# Patient Record
Sex: FEMALE | Race: WHITE | NOT HISPANIC OR LATINO | Employment: FULL TIME | URBAN - METROPOLITAN AREA
[De-identification: names, ages, dates, MRNs, and addresses within clinical notes are randomized per-mention and may not be internally consistent; named-entity substitution may affect disease eponyms.]

---

## 2024-08-01 ENCOUNTER — HOSPITAL ENCOUNTER (OUTPATIENT)
Facility: CLINIC | Age: 34
Setting detail: OBSERVATION
Discharge: HOME | End: 2024-08-03
Attending: STUDENT IN AN ORGANIZED HEALTH CARE EDUCATION/TRAINING PROGRAM | Admitting: HOSPITALIST
Payer: COMMERCIAL

## 2024-08-01 DIAGNOSIS — F10.930 ALCOHOL WITHDRAWAL SYNDROME WITHOUT COMPLICATION (H): ICD-10-CM

## 2024-08-01 DIAGNOSIS — F10.939 ALCOHOL WITHDRAWAL SYNDROME WITH COMPLICATION (H): Primary | ICD-10-CM

## 2024-08-01 LAB
ALBUMIN SERPL BCG-MCNC: 4.6 G/DL (ref 3.5–5.2)
ALP SERPL-CCNC: 66 U/L (ref 40–150)
ALT SERPL W P-5'-P-CCNC: 22 U/L (ref 0–50)
ANION GAP SERPL CALCULATED.3IONS-SCNC: 20 MMOL/L (ref 7–15)
AST SERPL W P-5'-P-CCNC: 35 U/L (ref 0–45)
BASOPHILS # BLD AUTO: 0 10E3/UL (ref 0–0.2)
BASOPHILS NFR BLD AUTO: 1 %
BILIRUB SERPL-MCNC: 0.4 MG/DL
BUN SERPL-MCNC: 3 MG/DL (ref 6–20)
CALCIUM SERPL-MCNC: 8.7 MG/DL (ref 8.8–10.4)
CHLORIDE SERPL-SCNC: 99 MMOL/L (ref 98–107)
CREAT SERPL-MCNC: 0.65 MG/DL (ref 0.51–0.95)
EGFRCR SERPLBLD CKD-EPI 2021: >90 ML/MIN/1.73M2
EOSINOPHIL # BLD AUTO: 0 10E3/UL (ref 0–0.7)
EOSINOPHIL NFR BLD AUTO: 0 %
ERYTHROCYTE [DISTWIDTH] IN BLOOD BY AUTOMATED COUNT: 12.5 % (ref 10–15)
ETHANOL SERPL-MCNC: 0.38 G/DL
GLUCOSE SERPL-MCNC: 117 MG/DL (ref 70–99)
HCG SERPL QL: NEGATIVE
HCO3 SERPL-SCNC: 21 MMOL/L (ref 22–29)
HCT VFR BLD AUTO: 39.5 % (ref 35–47)
HGB BLD-MCNC: 14 G/DL (ref 11.7–15.7)
IMM GRANULOCYTES # BLD: 0 10E3/UL
IMM GRANULOCYTES NFR BLD: 0 %
LYMPHOCYTES # BLD AUTO: 1.3 10E3/UL (ref 0.8–5.3)
LYMPHOCYTES NFR BLD AUTO: 31 %
MAGNESIUM SERPL-MCNC: 1.8 MG/DL (ref 1.7–2.3)
MCH RBC QN AUTO: 31.1 PG (ref 26.5–33)
MCHC RBC AUTO-ENTMCNC: 35.4 G/DL (ref 31.5–36.5)
MCV RBC AUTO: 88 FL (ref 78–100)
MONOCYTES # BLD AUTO: 0.4 10E3/UL (ref 0–1.3)
MONOCYTES NFR BLD AUTO: 10 %
NEUTROPHILS # BLD AUTO: 2.5 10E3/UL (ref 1.6–8.3)
NEUTROPHILS NFR BLD AUTO: 58 %
NRBC # BLD AUTO: 0 10E3/UL
NRBC BLD AUTO-RTO: 0 /100
PHOSPHATE SERPL-MCNC: 2.7 MG/DL (ref 2.5–4.5)
PLATELET # BLD AUTO: 221 10E3/UL (ref 150–450)
POTASSIUM SERPL-SCNC: 3.3 MMOL/L (ref 3.4–5.3)
PROT SERPL-MCNC: 7.9 G/DL (ref 6.4–8.3)
RBC # BLD AUTO: 4.5 10E6/UL (ref 3.8–5.2)
SODIUM SERPL-SCNC: 140 MMOL/L (ref 135–145)
WBC # BLD AUTO: 4.2 10E3/UL (ref 4–11)

## 2024-08-01 PROCEDURE — 83735 ASSAY OF MAGNESIUM: CPT | Performed by: HOSPITALIST

## 2024-08-01 PROCEDURE — 96374 THER/PROPH/DIAG INJ IV PUSH: CPT

## 2024-08-01 PROCEDURE — 85004 AUTOMATED DIFF WBC COUNT: CPT | Performed by: STUDENT IN AN ORGANIZED HEALTH CARE EDUCATION/TRAINING PROGRAM

## 2024-08-01 PROCEDURE — 80053 COMPREHEN METABOLIC PANEL: CPT | Performed by: STUDENT IN AN ORGANIZED HEALTH CARE EDUCATION/TRAINING PROGRAM

## 2024-08-01 PROCEDURE — 84100 ASSAY OF PHOSPHORUS: CPT | Performed by: STUDENT IN AN ORGANIZED HEALTH CARE EDUCATION/TRAINING PROGRAM

## 2024-08-01 PROCEDURE — 84703 CHORIONIC GONADOTROPIN ASSAY: CPT | Performed by: STUDENT IN AN ORGANIZED HEALTH CARE EDUCATION/TRAINING PROGRAM

## 2024-08-01 PROCEDURE — 250N000013 HC RX MED GY IP 250 OP 250 PS 637: Performed by: STUDENT IN AN ORGANIZED HEALTH CARE EDUCATION/TRAINING PROGRAM

## 2024-08-01 PROCEDURE — 93005 ELECTROCARDIOGRAM TRACING: CPT

## 2024-08-01 PROCEDURE — 83735 ASSAY OF MAGNESIUM: CPT | Mod: 91 | Performed by: STUDENT IN AN ORGANIZED HEALTH CARE EDUCATION/TRAINING PROGRAM

## 2024-08-01 PROCEDURE — 96361 HYDRATE IV INFUSION ADD-ON: CPT

## 2024-08-01 PROCEDURE — 99285 EMERGENCY DEPT VISIT HI MDM: CPT | Mod: 25

## 2024-08-01 PROCEDURE — 250N000011 HC RX IP 250 OP 636: Performed by: STUDENT IN AN ORGANIZED HEALTH CARE EDUCATION/TRAINING PROGRAM

## 2024-08-01 PROCEDURE — 82077 ASSAY SPEC XCP UR&BREATH IA: CPT | Performed by: STUDENT IN AN ORGANIZED HEALTH CARE EDUCATION/TRAINING PROGRAM

## 2024-08-01 PROCEDURE — 36415 COLL VENOUS BLD VENIPUNCTURE: CPT | Performed by: STUDENT IN AN ORGANIZED HEALTH CARE EDUCATION/TRAINING PROGRAM

## 2024-08-01 PROCEDURE — 84100 ASSAY OF PHOSPHORUS: CPT | Performed by: HOSPITALIST

## 2024-08-01 PROCEDURE — 258N000003 HC RX IP 258 OP 636: Performed by: STUDENT IN AN ORGANIZED HEALTH CARE EDUCATION/TRAINING PROGRAM

## 2024-08-01 RX ORDER — LORAZEPAM 2 MG/ML
0.5 INJECTION INTRAMUSCULAR ONCE
Status: COMPLETED | OUTPATIENT
Start: 2024-08-01 | End: 2024-08-01

## 2024-08-01 RX ORDER — POTASSIUM CHLORIDE 1.5 G/1.58G
40 POWDER, FOR SOLUTION ORAL ONCE
Status: COMPLETED | OUTPATIENT
Start: 2024-08-01 | End: 2024-08-01

## 2024-08-01 RX ORDER — DIAZEPAM 5 MG
10 TABLET ORAL ONCE
Status: COMPLETED | OUTPATIENT
Start: 2024-08-01 | End: 2024-08-01

## 2024-08-01 RX ADMIN — SODIUM CHLORIDE 1000 ML: 9 INJECTION, SOLUTION INTRAVENOUS at 20:07

## 2024-08-01 RX ADMIN — LORAZEPAM 0.5 MG: 2 INJECTION INTRAMUSCULAR; INTRAVENOUS at 20:12

## 2024-08-01 RX ADMIN — DIAZEPAM 10 MG: 5 TABLET ORAL at 23:12

## 2024-08-01 RX ADMIN — POTASSIUM CHLORIDE 40 MEQ: 1.5 POWDER, FOR SOLUTION ORAL at 21:33

## 2024-08-01 ASSESSMENT — LIFESTYLE VARIABLES
AGITATION: SOMEWHAT MORE THAN NORMAL ACTIVITY
NAUSEA AND VOMITING: 2
AGITATION: SOMEWHAT MORE THAN NORMAL ACTIVITY
ORIENTATION AND CLOUDING OF SENSORIUM: ORIENTED AND CAN DO SERIAL ADDITIONS
AUDITORY DISTURBANCES: NOT PRESENT
PAROXYSMAL SWEATS: NO SWEAT VISIBLE
TOTAL SCORE: 10
VISUAL DISTURBANCES: NOT PRESENT
ORIENTATION AND CLOUDING OF SENSORIUM: ORIENTED AND CAN DO SERIAL ADDITIONS
TOTAL SCORE: 8
TREMOR: 2
PAROXYSMAL SWEATS: NO SWEAT VISIBLE
NAUSEA AND VOMITING: MILD NAUSEA WITH NO VOMITING
HEADACHE, FULLNESS IN HEAD: VERY MILD
TOTAL SCORE: 15
HEADACHE, FULLNESS IN HEAD: MILD
AGITATION: SOMEWHAT MORE THAN NORMAL ACTIVITY
ANXIETY: 3
AUDITORY DISTURBANCES: MILD HARSHNESS OR ABILITY TO FRIGHTEN
ANXIETY: MODERATELY ANXIOUS, OR GUARDED, SO ANXIETY IS INFERRED
VISUAL DISTURBANCES: VERY MILD SENSITIVITY
TREMOR: NO TREMOR
PAROXYSMAL SWEATS: 3
HEADACHE, FULLNESS IN HEAD: MILD
TREMOR: NOT VISIBLE, BUT CAN BE FELT FINGERTIP TO FINGERTIP
ORIENTATION AND CLOUDING OF SENSORIUM: ORIENTED AND CAN DO SERIAL ADDITIONS
NAUSEA AND VOMITING: MILD NAUSEA WITH NO VOMITING
VISUAL DISTURBANCES: NOT PRESENT
ANXIETY: 5
AUDITORY DISTURBANCES: NOT PRESENT

## 2024-08-01 ASSESSMENT — ACTIVITIES OF DAILY LIVING (ADL)
ADLS_ACUITY_SCORE: 35

## 2024-08-01 NOTE — LETTER
[facility] OBSERVATION DEPT  [address]  [facility] [address]  Phone: [phone]    August 3, 2024        Jacquie Henderson  [address] [facility] [address]        To whom it may concern:    RE: Jacquie Henderson    Patient was seen and treated today at our hospital. Please excuse her absence from 8/1-8/3 as she was hospitalized. Due to illness, she will need to be excused from work until 8/9. Patient may return to work 8/10 with the following:  No restrictions    Please contact me for questions or concerns.      Sincerely,    Avril Flowers MD

## 2024-08-02 PROBLEM — F10.939 ALCOHOL WITHDRAWAL (H): Status: ACTIVE | Noted: 2024-08-02

## 2024-08-02 PROBLEM — F10.930 ALCOHOL WITHDRAWAL SYNDROME WITHOUT COMPLICATION (H): Status: ACTIVE | Noted: 2024-08-02

## 2024-08-02 LAB
ATRIAL RATE - MUSE: 101 BPM
DIASTOLIC BLOOD PRESSURE - MUSE: NORMAL MMHG
INTERPRETATION ECG - MUSE: NORMAL
MAGNESIUM SERPL-MCNC: 1.8 MG/DL (ref 1.7–2.3)
P AXIS - MUSE: 62 DEGREES
PHOSPHATE SERPL-MCNC: 3 MG/DL (ref 2.5–4.5)
POTASSIUM SERPL-SCNC: 4 MMOL/L (ref 3.4–5.3)
PR INTERVAL - MUSE: 138 MS
QRS DURATION - MUSE: 102 MS
QT - MUSE: 374 MS
QTC - MUSE: 484 MS
R AXIS - MUSE: 59 DEGREES
SYSTOLIC BLOOD PRESSURE - MUSE: NORMAL MMHG
T AXIS - MUSE: 24 DEGREES
VENTRICULAR RATE- MUSE: 101 BPM

## 2024-08-02 PROCEDURE — G0378 HOSPITAL OBSERVATION PER HR: HCPCS

## 2024-08-02 PROCEDURE — 96376 TX/PRO/DX INJ SAME DRUG ADON: CPT

## 2024-08-02 PROCEDURE — 258N000003 HC RX IP 258 OP 636: Performed by: HOSPITALIST

## 2024-08-02 PROCEDURE — 250N000013 HC RX MED GY IP 250 OP 250 PS 637: Performed by: HOSPITALIST

## 2024-08-02 PROCEDURE — 99223 1ST HOSP IP/OBS HIGH 75: CPT | Performed by: HOSPITALIST

## 2024-08-02 PROCEDURE — 99207 PR APP CREDIT; MD BILLING SHARED VISIT: CPT | Performed by: INTERNAL MEDICINE

## 2024-08-02 PROCEDURE — 36415 COLL VENOUS BLD VENIPUNCTURE: CPT | Performed by: INTERNAL MEDICINE

## 2024-08-02 PROCEDURE — 84132 ASSAY OF SERUM POTASSIUM: CPT | Performed by: INTERNAL MEDICINE

## 2024-08-02 PROCEDURE — 250N000013 HC RX MED GY IP 250 OP 250 PS 637: Performed by: INTERNAL MEDICINE

## 2024-08-02 PROCEDURE — 250N000011 HC RX IP 250 OP 636: Performed by: HOSPITALIST

## 2024-08-02 RX ORDER — GABAPENTIN 100 MG/1
100 CAPSULE ORAL EVERY 8 HOURS
Status: DISCONTINUED | OUTPATIENT
Start: 2024-08-09 | End: 2024-08-03 | Stop reason: HOSPADM

## 2024-08-02 RX ORDER — DIAZEPAM 10 MG/2ML
5-10 INJECTION, SOLUTION INTRAMUSCULAR; INTRAVENOUS EVERY 30 MIN PRN
Status: DISCONTINUED | OUTPATIENT
Start: 2024-08-02 | End: 2024-08-02

## 2024-08-02 RX ORDER — DEXTROSE MONOHYDRATE, SODIUM CHLORIDE, AND POTASSIUM CHLORIDE 50; 1.49; 4.5 G/1000ML; G/1000ML; G/1000ML
INJECTION, SOLUTION INTRAVENOUS CONTINUOUS
Status: DISCONTINUED | OUTPATIENT
Start: 2024-08-02 | End: 2024-08-03 | Stop reason: HOSPADM

## 2024-08-02 RX ORDER — HALOPERIDOL 5 MG/ML
2.5-5 INJECTION INTRAMUSCULAR EVERY 6 HOURS PRN
Status: DISCONTINUED | OUTPATIENT
Start: 2024-08-02 | End: 2024-08-03 | Stop reason: HOSPADM

## 2024-08-02 RX ORDER — AMOXICILLIN 250 MG
2 CAPSULE ORAL 2 TIMES DAILY PRN
Status: DISCONTINUED | OUTPATIENT
Start: 2024-08-02 | End: 2024-08-03 | Stop reason: HOSPADM

## 2024-08-02 RX ORDER — GABAPENTIN 300 MG/1
600 CAPSULE ORAL EVERY 8 HOURS
Status: DISCONTINUED | OUTPATIENT
Start: 2024-08-05 | End: 2024-08-03 | Stop reason: HOSPADM

## 2024-08-02 RX ORDER — MULTIPLE VITAMINS W/ MINERALS TAB 9MG-400MCG
1 TAB ORAL DAILY
Status: DISCONTINUED | OUTPATIENT
Start: 2024-08-02 | End: 2024-08-03 | Stop reason: HOSPADM

## 2024-08-02 RX ORDER — GABAPENTIN 600 MG/1
1200 TABLET ORAL ONCE
Status: COMPLETED | OUTPATIENT
Start: 2024-08-02 | End: 2024-08-02

## 2024-08-02 RX ORDER — DIAZEPAM 5 MG
10 TABLET ORAL EVERY 30 MIN PRN
Status: DISCONTINUED | OUTPATIENT
Start: 2024-08-02 | End: 2024-08-02

## 2024-08-02 RX ORDER — ONDANSETRON 2 MG/ML
4 INJECTION INTRAMUSCULAR; INTRAVENOUS EVERY 6 HOURS PRN
Status: DISCONTINUED | OUTPATIENT
Start: 2024-08-02 | End: 2024-08-03 | Stop reason: HOSPADM

## 2024-08-02 RX ORDER — POLYETHYLENE GLYCOL 3350 17 G/17G
17 POWDER, FOR SOLUTION ORAL 2 TIMES DAILY PRN
Status: DISCONTINUED | OUTPATIENT
Start: 2024-08-02 | End: 2024-08-03 | Stop reason: HOSPADM

## 2024-08-02 RX ORDER — ONDANSETRON 4 MG/1
4 TABLET, ORALLY DISINTEGRATING ORAL EVERY 6 HOURS PRN
Status: DISCONTINUED | OUTPATIENT
Start: 2024-08-02 | End: 2024-08-03 | Stop reason: HOSPADM

## 2024-08-02 RX ORDER — FLUMAZENIL 0.1 MG/ML
0.2 INJECTION, SOLUTION INTRAVENOUS
Status: DISCONTINUED | OUTPATIENT
Start: 2024-08-02 | End: 2024-08-02

## 2024-08-02 RX ORDER — OLANZAPINE 5 MG/1
5-10 TABLET, ORALLY DISINTEGRATING ORAL EVERY 6 HOURS PRN
Status: DISCONTINUED | OUTPATIENT
Start: 2024-08-02 | End: 2024-08-03 | Stop reason: HOSPADM

## 2024-08-02 RX ORDER — POTASSIUM CHLORIDE 1500 MG/1
40 TABLET, EXTENDED RELEASE ORAL ONCE
Status: COMPLETED | OUTPATIENT
Start: 2024-08-02 | End: 2024-08-02

## 2024-08-02 RX ORDER — AMOXICILLIN 250 MG
1 CAPSULE ORAL 2 TIMES DAILY PRN
Status: DISCONTINUED | OUTPATIENT
Start: 2024-08-02 | End: 2024-08-03 | Stop reason: HOSPADM

## 2024-08-02 RX ORDER — CLONIDINE HYDROCHLORIDE 0.1 MG/1
0.1 TABLET ORAL EVERY 8 HOURS
Status: DISCONTINUED | OUTPATIENT
Start: 2024-08-02 | End: 2024-08-02

## 2024-08-02 RX ORDER — OLANZAPINE 5 MG/1
5-10 TABLET, ORALLY DISINTEGRATING ORAL EVERY 6 HOURS PRN
Status: DISCONTINUED | OUTPATIENT
Start: 2024-08-02 | End: 2024-08-02

## 2024-08-02 RX ORDER — GABAPENTIN 100 MG/1
300 CAPSULE ORAL EVERY 8 HOURS
Status: DISCONTINUED | OUTPATIENT
Start: 2024-08-07 | End: 2024-08-02

## 2024-08-02 RX ORDER — GABAPENTIN 100 MG/1
100 CAPSULE ORAL EVERY 8 HOURS
Status: DISCONTINUED | OUTPATIENT
Start: 2024-08-09 | End: 2024-08-02

## 2024-08-02 RX ORDER — ACETAMINOPHEN 325 MG/1
650 TABLET ORAL EVERY 4 HOURS PRN
Status: DISCONTINUED | OUTPATIENT
Start: 2024-08-02 | End: 2024-08-03 | Stop reason: HOSPADM

## 2024-08-02 RX ORDER — FOLIC ACID 1 MG/1
1 TABLET ORAL DAILY
Status: DISCONTINUED | OUTPATIENT
Start: 2024-08-02 | End: 2024-08-02

## 2024-08-02 RX ORDER — HALOPERIDOL 5 MG/ML
2.5-5 INJECTION INTRAMUSCULAR EVERY 6 HOURS PRN
Status: DISCONTINUED | OUTPATIENT
Start: 2024-08-02 | End: 2024-08-02

## 2024-08-02 RX ORDER — GABAPENTIN 300 MG/1
900 CAPSULE ORAL EVERY 8 HOURS
Status: DISCONTINUED | OUTPATIENT
Start: 2024-08-02 | End: 2024-08-03 | Stop reason: HOSPADM

## 2024-08-02 RX ORDER — BUTALBITAL, ACETAMINOPHEN AND CAFFEINE 50; 325; 40 MG/1; MG/1; MG/1
1 TABLET ORAL EVERY 6 HOURS PRN
COMMUNITY

## 2024-08-02 RX ORDER — ACETAMINOPHEN 650 MG/1
650 SUPPOSITORY RECTAL EVERY 4 HOURS PRN
Status: DISCONTINUED | OUTPATIENT
Start: 2024-08-02 | End: 2024-08-03 | Stop reason: HOSPADM

## 2024-08-02 RX ORDER — GABAPENTIN 300 MG/1
300 CAPSULE ORAL EVERY 8 HOURS
Status: DISCONTINUED | OUTPATIENT
Start: 2024-08-07 | End: 2024-08-03 | Stop reason: HOSPADM

## 2024-08-02 RX ORDER — MULTIPLE VITAMINS W/ MINERALS TAB 9MG-400MCG
1 TAB ORAL DAILY
Status: DISCONTINUED | OUTPATIENT
Start: 2024-08-02 | End: 2024-08-02

## 2024-08-02 RX ORDER — GABAPENTIN 600 MG/1
1200 TABLET ORAL ONCE
Status: DISCONTINUED | OUTPATIENT
Start: 2024-08-02 | End: 2024-08-02

## 2024-08-02 RX ORDER — GABAPENTIN 300 MG/1
900 CAPSULE ORAL EVERY 8 HOURS
Status: DISCONTINUED | OUTPATIENT
Start: 2024-08-02 | End: 2024-08-02

## 2024-08-02 RX ORDER — GABAPENTIN 300 MG/1
600 CAPSULE ORAL EVERY 8 HOURS
Status: DISCONTINUED | OUTPATIENT
Start: 2024-08-05 | End: 2024-08-02

## 2024-08-02 RX ORDER — FLUMAZENIL 0.1 MG/ML
0.2 INJECTION, SOLUTION INTRAVENOUS
Status: DISCONTINUED | OUTPATIENT
Start: 2024-08-02 | End: 2024-08-03 | Stop reason: HOSPADM

## 2024-08-02 RX ORDER — LORAZEPAM 1 MG/1
1-2 TABLET ORAL EVERY 30 MIN PRN
Status: DISCONTINUED | OUTPATIENT
Start: 2024-08-02 | End: 2024-08-03

## 2024-08-02 RX ORDER — LORAZEPAM 2 MG/ML
1-2 INJECTION INTRAMUSCULAR EVERY 30 MIN PRN
Status: DISCONTINUED | OUTPATIENT
Start: 2024-08-02 | End: 2024-08-03

## 2024-08-02 RX ORDER — FOLIC ACID 1 MG/1
1 TABLET ORAL DAILY
Status: DISCONTINUED | OUTPATIENT
Start: 2024-08-02 | End: 2024-08-03 | Stop reason: HOSPADM

## 2024-08-02 RX ORDER — L.ACID/L.CASEI/B.BIF/B.LON/FOS 2B CELL-50
1 CAPSULE ORAL DAILY
COMMUNITY

## 2024-08-02 RX ADMIN — GABAPENTIN 900 MG: 300 CAPSULE ORAL at 08:02

## 2024-08-02 RX ADMIN — POTASSIUM CHLORIDE, DEXTROSE MONOHYDRATE AND SODIUM CHLORIDE: 150; 5; 450 INJECTION, SOLUTION INTRAVENOUS at 01:01

## 2024-08-02 RX ADMIN — THIAMINE HCL TAB 100 MG 100 MG: 100 TAB at 00:42

## 2024-08-02 RX ADMIN — POTASSIUM CHLORIDE 40 MEQ: 1500 TABLET, EXTENDED RELEASE ORAL at 13:28

## 2024-08-02 RX ADMIN — ACETAMINOPHEN 650 MG: 325 TABLET, FILM COATED ORAL at 11:05

## 2024-08-02 RX ADMIN — LORAZEPAM 1 MG: 1 TABLET ORAL at 12:07

## 2024-08-02 RX ADMIN — POTASSIUM CHLORIDE, DEXTROSE MONOHYDRATE AND SODIUM CHLORIDE: 150; 5; 450 INJECTION, SOLUTION INTRAVENOUS at 19:36

## 2024-08-02 RX ADMIN — LORAZEPAM 1 MG: 2 INJECTION INTRAMUSCULAR; INTRAVENOUS at 16:58

## 2024-08-02 RX ADMIN — LORAZEPAM 2 MG: 2 INJECTION INTRAMUSCULAR; INTRAVENOUS at 05:21

## 2024-08-02 RX ADMIN — FOLIC ACID 1 MG: 1 TABLET ORAL at 08:02

## 2024-08-02 RX ADMIN — LORAZEPAM 2 MG: 2 INJECTION INTRAMUSCULAR; INTRAVENOUS at 00:30

## 2024-08-02 RX ADMIN — THIAMINE HCL TAB 100 MG 100 MG: 100 TAB at 08:02

## 2024-08-02 RX ADMIN — Medication 1 TABLET: at 08:02

## 2024-08-02 RX ADMIN — GABAPENTIN 1200 MG: 600 TABLET, FILM COATED ORAL at 00:42

## 2024-08-02 RX ADMIN — LORAZEPAM 2 MG: 2 INJECTION INTRAMUSCULAR; INTRAVENOUS at 13:33

## 2024-08-02 RX ADMIN — GABAPENTIN 900 MG: 300 CAPSULE ORAL at 16:58

## 2024-08-02 RX ADMIN — POTASSIUM CHLORIDE, DEXTROSE MONOHYDRATE AND SODIUM CHLORIDE: 150; 5; 450 INJECTION, SOLUTION INTRAVENOUS at 10:04

## 2024-08-02 ASSESSMENT — LIFESTYLE VARIABLES
ORIENTATION AND CLOUDING OF SENSORIUM: ORIENTED AND CAN DO SERIAL ADDITIONS
HEADACHE, FULLNESS IN HEAD: MILD
NAUSEA AND VOMITING: NO NAUSEA AND NO VOMITING
VISUAL DISTURBANCES: NOT PRESENT
PAROXYSMAL SWEATS: NO SWEAT VISIBLE
TREMOR: NOT VISIBLE, BUT CAN BE FELT FINGERTIP TO FINGERTIP
ORIENTATION AND CLOUDING OF SENSORIUM: ORIENTED AND CAN DO SERIAL ADDITIONS
AGITATION: NORMAL ACTIVITY
PAROXYSMAL SWEATS: BARELY PERCEPTIBLE SWEATING, PALMS MOIST
TREMOR: MODERATE, WITH PATIENT'S ARMS EXTENDED
VISUAL DISTURBANCES: NOT PRESENT
AUDITORY DISTURBANCES: MODERATE HARSHNESS OR ABILITY TO FRIGHTEN
PAROXYSMAL SWEATS: NO SWEAT VISIBLE
ORIENTATION AND CLOUDING OF SENSORIUM: ORIENTED AND CAN DO SERIAL ADDITIONS
AUDITORY DISTURBANCES: NOT PRESENT
TREMOR: NO TREMOR
ORIENTATION AND CLOUDING OF SENSORIUM: ORIENTED AND CAN DO SERIAL ADDITIONS
HEADACHE, FULLNESS IN HEAD: NOT PRESENT
AGITATION: NORMAL ACTIVITY
ANXIETY: NO ANXIETY, AT EASE
TOTAL SCORE: 0
AGITATION: NORMAL ACTIVITY
TREMOR: NO TREMOR
ANXIETY: NO ANXIETY, AT EASE
NAUSEA AND VOMITING: NO NAUSEA AND NO VOMITING
HEADACHE, FULLNESS IN HEAD: NOT PRESENT
AGITATION: 2
VISUAL DISTURBANCES: NOT PRESENT
ORIENTATION AND CLOUDING OF SENSORIUM: ORIENTED AND CAN DO SERIAL ADDITIONS
PAROXYSMAL SWEATS: NO SWEAT VISIBLE
NAUSEA AND VOMITING: NO NAUSEA AND NO VOMITING
TOTAL SCORE: 0
HEADACHE, FULLNESS IN HEAD: NOT PRESENT
AGITATION: NORMAL ACTIVITY
NAUSEA AND VOMITING: NO NAUSEA AND NO VOMITING
AUDITORY DISTURBANCES: NOT PRESENT
VISUAL DISTURBANCES: MILD SENSITIVITY
HEADACHE, FULLNESS IN HEAD: NOT PRESENT
ANXIETY: MODERATELY ANXIOUS, OR GUARDED, SO ANXIETY IS INFERRED
AUDITORY DISTURBANCES: NOT PRESENT
AUDITORY DISTURBANCES: NOT PRESENT
ANXIETY: NO ANXIETY, AT EASE
ANXIETY: NO ANXIETY, AT EASE
VISUAL DISTURBANCES: NOT PRESENT
NAUSEA AND VOMITING: NO NAUSEA AND NO VOMITING
PAROXYSMAL SWEATS: BARELY PERCEPTIBLE SWEATING, PALMS MOIST
TOTAL SCORE: 0
TOTAL SCORE: 2
TREMOR: NO TREMOR
TOTAL SCORE: 18

## 2024-08-02 ASSESSMENT — ACTIVITIES OF DAILY LIVING (ADL)
ADLS_ACUITY_SCORE: 21
ADLS_ACUITY_SCORE: 35
ADLS_ACUITY_SCORE: 19
ADLS_ACUITY_SCORE: 35
ADLS_ACUITY_SCORE: 21
ADLS_ACUITY_SCORE: 36
ADLS_ACUITY_SCORE: 36
ADLS_ACUITY_SCORE: 21
ADLS_ACUITY_SCORE: 21
ADLS_ACUITY_SCORE: 35
ADLS_ACUITY_SCORE: 21
ADLS_ACUITY_SCORE: 35
ADLS_ACUITY_SCORE: 36
ADLS_ACUITY_SCORE: 21
ADLS_ACUITY_SCORE: 35
ADLS_ACUITY_SCORE: 21
ADLS_ACUITY_SCORE: 35
ADLS_ACUITY_SCORE: 21
ADLS_ACUITY_SCORE: 21

## 2024-08-02 NOTE — CONSULTS
Care Management Note    Discharge Disposition: Home    Discharge Services:  IOP Chemical Dependency Treatment    Discharge Transportation: Family    Private pay costs discussed: Not applicable    Does the patient's insurance plan have a 3 day qualifying hospital stay waiver?  No    Patient/Family in Agreement with the Plan: Yes    Handoff Referral Completed: No    Additional Information:  SW consulted for chemical health issues. CD has been formally consulted and completed their assessment, see eval 8/2. Pt agreeable to intensive outpatient treatment and CD has sent referrals with patient permission. IOP programs to coordinate with patient for next steps. No additional SW needs identified. Consult cleared. Please call if SW needs arise prior to discharge.     KAI Doyle, [facility]   Inpatient Care Coordination  [facility]   [phone]

## 2024-08-02 NOTE — CONSULTS
Met with patient to discuss possible JATIN treatment options and to possibly complete an assessment. Assessment has been completed at this time and patient is being recommended:         Recommendations: Intensive Outpatient Treatment     Referrals/ Alternatives:  [facility]  [email or web address]   [address], [address],  [place] [address] [phone]     [facility]           [address]  [place] [address]  Office: [phone]  Fax: [phone]    [facility] IOP Intake,  [phone]     Referrals are being made at this time.  Patient is aware of the referrals and is in agreement.     STAN Lares on 8/2/2024 at 10:02 AM

## 2024-08-02 NOTE — PHARMACY-ADMISSION MEDICATION HISTORY
Pharmacist Admission Medication History    Admission medication history is complete. The information provided in this note is only as accurate as the sources available at the time of the update.    Information Source(s): Patient via in-person    Pertinent Information: none    Changes made to PTA medication list:  Added: All PTA meds were added  Deleted: None  Changed: None    Medication History Completed By:   No Pickens, PharmD, [facility]   Emergency Medicine Pharmacist  [phone] or [place]  August 2, 2024    PTA Med List   Medication Sig Last Dose    butalbital-acetaminophen-caffeine (ESGIC) -40 MG tablet Take 1 tablet by mouth every 6 hours as needed for migraine prn at prn    melatonin 5 MG tablet Take 5 mg by mouth nightly as needed for sleep Past Week at -    OVER-THE-COUNTER CBD w/ melatonin - take 1-2 at bedtime as needed for sleep prn at prn    Probiotic Product (PROBIOTIC BLEND) CAPS Take 1 capsule by mouth daily Past Week at -

## 2024-08-02 NOTE — PLAN OF CARE
"PRIMARY DIAGNOSIS: Alcohol Withdrawal  OUTPATIENT/OBSERVATION GOALS TO BE MET BEFORE DISCHARGE:  ADLs back to baseline: No    Activity and level of assistance: Up with standby assistance.    Pain status: Pain free.    Return to near baseline physical activity:  No, feels unsteady on feet     Discharge Planner Nurse   Safe discharge environment identified: Yes  Barriers to discharge: Yes    Patient is A/Ox4, on room air, up SBA, PIV infusing D5 0.45 NS 20 Kcl at 125 mL/hr, on a regular diet, reporting no pain. CIWA score of 8 at 12:00 pm- 1 mg PO ativan given per protocol, recheck CIWA score in 1 hour.     /88 (BP Location: Left arm)   Pulse 112   Temp 99  F (37.2  C) (Oral)   Resp 20   Ht 1.702 m (5' 7\")   Wt 70.6 kg (155 lb 11.2 oz)   SpO2 96%   BMI 24.39 kg/m       Please review provider order for any additional goals.   Nurse to notify provider when observation goals have been met and patient is ready for discharge.    Goal Outcome Evaluation:      Plan of Care Reviewed With: patient, grandparent    Overall Patient Progress: improvingOverall Patient Progress: improving    Outcome Evaluation: CIWA score of 8- 1 mg PO ativan given      "

## 2024-08-02 NOTE — H&P
[facility]  Hospitalist H&P    Name: Jacquie Henderson      MRN: [id number]  YOB: 1990    Age: 34 year old  Date of admission: 8/1/2024  Primary care provider: [facility]            Assessment and Plan:     Jacquie Henderson is a 34 year old female with a history of alcohol use disorder who presents with evolving alcohol withdrawal.    Problem list:  Alcohol use disorder, alcohol intoxication, and evolving alcohol withdrawal: The patient is somewhat vague about the extent of her alcohol use.  She does report drinking on a regular basis for the past several years.  She denies drinking every day.  She tells me that she drinks variable amounts, sometimes as little as just a few beers but sometimes more excessive like a pint of vodka.  Her last drink was at 4 PM.  Her brother came to see her for essentially a wellness visit and found her altered and called an ambulance which brought her to the hospital.  Here she is fully oriented and clinically sober although recorded a blood alcohol level of 0.38 on arrival.  She already has tremors and is scoring excessively on the CIWA protocol so she will be admitted for management of evolving withdrawal symptoms.  We will start the scheduled/tapering gabapentin protocol as well as CIWA scoring with trigger dose lorazepam.  She will receive thiamine, folate, and MVI.  She does appear motivated to quit drinking so we will request a CD/SWI consultation to provide resources.  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  She has also never attended an AA meeting.  Hypokalemia: Start the electrolyte replacement protocols.  Anion gap metabolic acidosis: Likely alcoholic/starvation ketosis.  Continue fluid resuscitation and dextrose administration with electrolyte replacement.    Code status: Full.  Admit to observation.  Prophylaxis:  PCD's.  Disposition: Home in 1 to 2 days depending on the severity and duration of the withdrawal symptoms.    80 MINUTES SPENT BY ME on the date of service doing chart review, history, exam, documentation & further activities per the note.          Chief Complaint:     Confusion.         History of Present Illness:   Jacquie Henderson is a 34 year old female who presents with confusion.  History was obtained from my discussion with the patient at the bedside.  I also discussed the case with the ED provider.  The electronic medical record was also reviewed.    The patient is somewhat vague about the extent of her alcohol use.  She does report drinking on a regular basis for the past several years.  She denies drinking every day.  She tells me that she drinks variable amounts, sometimes as little as just a few beers but sometimes more excessive like a pint of vodka.  Her last drink was at 4 PM.  Her brother came to see her for essentially a wellness visit and found her altered and called an ambulance which brought her to the hospital.      Here in the ED her temperature is 98.2, heart rate 110, blood pressure 136/104, respirate 18 and oxygen saturation 95% on room air.  Labs show a potassium of 3.3, bicarb 21, anion gap 20, and glucose 117.  The remainder of the basic metabolic panel, liver function test, urine pregnancy test and CBC are normal.  Blood alcohol level is 0.38.  EKG shows sinus tachycardia with a rate of 101 and a QTc of 484.            Past Medical History:   Alcohol use disorder.          Past Surgical History:   No past surgical history on file.          Social History:     Social History     Tobacco Use    Smoking status: Not on file    Smokeless tobacco: Not on file   Substance Use Topics    Alcohol use: Not on file             Family History:   The family history was fully reviewed and non-contributory in this case.         Allergies:   No Known Allergies          Medications:     Prior to  "Admission medications    Not on File             Review of Systems:     A Comprehensive greater than 10 system review of systems was carried out.  Pertinent positives and negatives are noted above.  Otherwise negative for contributory information.           Physical Exam:   Blood pressure (!) 120/95, pulse 117, temperature 98.2  F (36.8  C), temperature source Oral, resp. rate 18, height 1.727 m (5' 8\"), weight 68.9 kg (152 lb), SpO2 98%.  Wt Readings from Last 1 Encounters:   08/01/24 68.9 kg (152 lb)     Exam:  GENERAL: No apparent distress. Awake, alert, and fully oriented.  HEENT: Normocephalic, atraumatic. Extraocular movements intact.  CARDIOVASCULAR: Regular rate and rhythm without murmurs or rubs. No S3.  PULMONARY: Clear to auscultation bilaterally.  ABDOMINAL: Soft, non-tender, non-distended. Bowel sounds normoactive.   EXTREMITIES: No cyanosis or clubbing. No appreciable edema.  NEUROLOGICAL: CN 2-12 grossly intact, no focal neurological deficits. Tremors.  DERMATOLOGICAL: No rash, ulcer, bruising, nor jaundice.          Data:   EKG:  Personally reviewed.   Sinus tachycardia  Incomplete right bundle branch block   Rate 101 bpm. RI interval 138 ms. QRS duration 102 ms. QT/QTc 374/484 ms. P-R-T axes 62 59 24.    Laboratory:  Recent Labs   Lab 08/01/24 2006   WBC 4.2   HGB 14.0   HCT 39.5   MCV 88        Recent Labs   Lab 08/01/24 2006      POTASSIUM 3.3*   CHLORIDE 99   CO2 21*   ANIONGAP 20*   *   BUN 3.0*   CR 0.65   GFRESTIMATED >90   LYLE 8.7*     No results for input(s): \"CULT\" in the last 168 hours.    Imaging:  No results found for this or any previous visit (from the past 24 hour(s)).    Terence Degroot DO MPH  [facility] Hospitalist  [address] [place] [address]  08/02/2024   "

## 2024-08-02 NOTE — PROGRESS NOTES
[facility]    Medicine Progress Note - Hospitalist Service    Date of Admission:  8/1/2024    Primary Care Physician   [facility]  CONSULTANTS: chem dep, social work    Assessment & Plan   Jacquie Henderson is a 34 year old female who presented with confusion. The patient was somewhat vague about the extent of her alcohol use.  She does report drinking on a regular basis for the past several years.  She denies drinking every day.  She tells me that she drinks variable amounts, sometimes as little as just a few beers but sometimes more excessive like a pint of vodka.  Her last drink was at 4 PM on 8/1/24.  Her brother came to see her for essentially a wellness visit and found her altered and called an ambulance which brought her to the hospital.       Here in the ED her temperature is 98.2, heart rate 110, blood pressure 136/104, respirate 18 and oxygen saturation 95% on room air.  Labs show a potassium of 3.3, bicarb 21, anion gap 20, and glucose 117.  The remainder of the basic metabolic panel, liver function test, urine pregnancy test and CBC are normal.  Blood alcohol level is 0.38.  EKG shows sinus tachycardia with a rate of 101 and a QTc of 484.     NONBILLABLE NOTE, PATIENT SEEN AFTER MIDNIGHT BY MY PARTNER.  REVIEWED H&P BY DR WILSON.      Alcohol dependence with acute intoxication and withdrawal:  The patient is somewhat vague about the extent of her alcohol use.  She does report drinking on a regular basis for the past several years and admits she has a problem.  She has a outpatient therapist but not in AA or treatment. Here she was fully oriented and clinically sober although recorded a blood alcohol level of 0.38 on arrival.  She quickly started having tremors so severe she could not sleep.  Her CIWA score was up to 18.  She was started on the CIWA protocol and given vitamins.  Will have Social work and chem dep see her for options.  She wants to get sober.   She works at [employer].    Sobriety was dicussed at length.  Her BUN is only 3 goes with alcohol abuse.  Patient lives her grandma and her brother is supportive.  She does not have kids or a signficant other.  Her mother  in the past and she has a strained relationship with her father.      Hypokalemia:  Start the electrolyte replacement protocols for an initial potassium of 3.3.      Anion gap metabolic acidosis:   Likely alcoholic/starvation ketosis.  Continue fluid resuscitation and dextrose administration with electrolyte replacement.     4.   Sinus tachy, right bundle branch block   Seen on EKG.        Discussed plan of care with nursing      Diet: Regular Diet Adult    DVT Prophylaxis: Pneumatic Compression Devices  Lamb Catheter: Not present  Lines: None     Cardiac Monitoring: None    RESTRAINTS: not indicated  Code Status: Full Code         This document was created using voice recognition technology.  Please excuse any typographical errors that may have occurred.  Please call with any questions.       # Hypokalemia: Lowest K = 3.3 mmol/L in last 2 days, will replace as needed      # Anion Gap Metabolic Acidosis: Highest Anion Gap = 20 mmol/L in last 2 days, will monitor and treat as appropriate                         Disposition Plan     Barrier to discharge: alcohol withdrawal  Medically Ready for Discharge: Anticipated in 2-4 Days       Yasmani Haney MD  Hospitalist Service  [facility]  Securely message with [employer] (more info)  Text page via [employer] Paging/Directory   ______________________________________________________________________    Interval History   Patient is new to me.  Patient was seen after midnight by my partner.  She is here with acute alcohol intoxication and going through withdrawal rather quickly.  Patient's BUN is quite low showing significant alcohol use.  Patient is open to trying to stay sober.  There is no other complaints.  Patient had  significant tremors overnight where she could even sleep.  CIWA score was 18      ROS: A comprehensive review of systems was negative except for items noted in the HPI.  Patient currently denies any fever, chills, sweats, nausea, vomiting, diarrhea, shortness of breath, or chest pain.      Physical Exam   Vital Signs: Temp: 99.3  F (37.4  C) Temp src: Axillary BP: 101/67 Pulse: 92   Resp: 18 SpO2: 95 % O2 Device: None (Room air)    Weight: 152 lbs 0 oz      General appearance: Patient is alert and oriented x3, no apparent distress, pleasant and conversing normally, speaking in full sentences, appears stated age  HEENT:Mucous membranes are moist  RESPIRATORY: Clear to auscultation bilateral, good air movement  CARDIOVASCULAR: Regular rhythm AND tachycardic,  no murmurs  GASTROINTESTINAL: Non-distended, non-tender, soft, bowel sounds present throughout, no mass or hepatosplenomegaly  MUSCULOSKELETAL: without deformity, normal range of motion  SKIN:  Warm, dry, no rashes, no mottling  NEUROLOGIC:  Cranial nerves II-XII intact, without any focal deficits, strength 5/5 throughout  EXTREMITIES:  Moves all extremities, no clubbing, cyanosis, nor edema  :  Lamb not present         Data     I have personally reviewed the following data over the past 24 hrs:    4.2  \   14.0   / 221     140 99 3.0 (L) /  117 (H)   3.3 (L) 21 (L) 0.65 \     ALT: 22 AST: 35 AP: 66 TBILI: 0.4   ALB: 4.6 TOT PROTEIN: 7.9 LIPASE: N/A       Imaging:   No results found for this or any previous visit.    Procedures: none  I have personally have reviewed the patient's most up to date radiologic exams, EKG showing sinus tach and RBBB, labs, orders, and medications myself

## 2024-08-02 NOTE — ED PROVIDER NOTES
"  Emergency Department Note      History of Present Illness     Chief Complaint   Alcohol Intoxication    HPI   Jacquie Shoemaker is a 34 year old female who presents to the ED via EMS for evaluation of alcohol intoxication. The patient reports that she has been drinking 5 beers per day recently and has had 5 beers in the past 5 hours. She was on the phone with her parents and they noticed things were off and called EMS. Patient reports of a mild headache but notes that she has chronic migraines and this feels like that. Patient endorses chest pain and blurry vision. She notes that she has PVCs. Patient is feeling anxious. She reports that she feels like she is having withdrawal symptoms. Patient has been eating and drinking fine. She states that she is on her menstrual cycle currently. Patient denies falling or hitting her head. She denies shortness of breath or vomiting. Patient denies any chance of pregnancy. She denies having anything intoxicating other than beer. She denies history of withdrawals or history of withdrawal seizures.     Independent Historian   None    Review of External Notes   none    Past Medical History     Medical History and Problem List   PVC's    Medications   None    Surgical History   The patient has no pertinent past surgical history.     Physical Exam     Patient Vitals for the past 24 hrs:   BP Temp Temp src Pulse Resp SpO2 Height Weight   08/02/24 0130 109/74 -- -- 101 -- 94 % -- --   08/02/24 0000 (!) 120/95 -- -- 117 -- 98 % -- --   08/01/24 2300 123/84 -- -- 113 -- 97 % -- --   08/01/24 2230 133/86 -- -- (!) 121 -- 96 % -- --   08/01/24 2200 131/87 -- -- 112 -- 97 % -- --   08/01/24 2130 129/88 -- -- 112 -- 97 % -- --   08/01/24 2100 (!) 127/90 -- -- 105 -- 97 % -- --   08/01/24 2030 (!) 135/94 -- -- 120 -- 97 % -- --   08/01/24 2015 (!) 125/92 -- -- 103 -- 97 % 1.727 m (5' 8\") 68.9 kg (152 lb)   08/01/24 2000 -- -- -- 94 -- 97 % -- --   08/01/24 1945 (!) 136/104 -- -- 110 -- 95 % " -- --   08/01/24 1937 (!) 148/107 98.2  F (36.8  C) Oral 111 18 98 % -- --     Physical Exam  General: Awake, alert, in no acute distress   HEENT: Atraumatic   EOM normal   External ears normal   Trachea midline  Neck: Supple, normal ROM  CV: Regular rate, regular rhythm   No lower extremity edema  2+ radial and DP pulses  PULM: Breath sounds normal bilaterally  No wheezes or rales  ABD: Soft, non-tender, non-distended  Normal bowel sounds   No rebound or guarding   MSK: No gross deformities  NEURO: Alert, no focal deficits  Mild hand tremors bilaterally  Skin: Warm, dry and intact     Diagnostics     Lab Results   Labs Ordered and Resulted from Time of ED Arrival to Time of ED Departure   ETHYL ALCOHOL LEVEL - Abnormal       Result Value    Alcohol ethyl 0.38 (*)    COMPREHENSIVE METABOLIC PANEL - Abnormal    Sodium 140      Potassium 3.3 (*)     Carbon Dioxide (CO2) 21 (*)     Anion Gap 20 (*)     Urea Nitrogen 3.0 (*)     Creatinine 0.65      GFR Estimate >90      Calcium 8.7 (*)     Chloride 99      Glucose 117 (*)     Alkaline Phosphatase 66      AST 35      ALT 22      Protein Total 7.9      Albumin 4.6      Bilirubin Total 0.4     MAGNESIUM - Normal    Magnesium 1.8     PHOSPHORUS - Normal    Phosphorus 2.7     HCG QUALITATIVE PREGNANCY - Normal    hCG Serum Qualitative Negative     MAGNESIUM - Normal    Magnesium 1.8     PHOSPHORUS - Normal    Phosphorus 3.0     CBC WITH PLATELETS AND DIFFERENTIAL    WBC Count 4.2      RBC Count 4.50      Hemoglobin 14.0      Hematocrit 39.5      MCV 88      MCH 31.1      MCHC 35.4      RDW 12.5      Platelet Count 221      % Neutrophils 58      % Lymphocytes 31      % Monocytes 10      % Eosinophils 0      % Basophils 1      % Immature Granulocytes 0      NRBCs per 100 WBC 0      Absolute Neutrophils 2.5      Absolute Lymphocytes 1.3      Absolute Monocytes 0.4      Absolute Eosinophils 0.0      Absolute Basophils 0.0      Absolute Immature Granulocytes 0.0      Absolute  NRBCs 0.0         Imaging   No orders to display       EKG   ECG taken at 20:10, ECG read at 20:11  Sinus tachycardia  Incomplete right bundle branch block   Rate 101 bpm. NE interval 138 ms. QRS duration 102 ms. QT/QTc 374/484 ms. P-R-T axes 62 59 24.    Independent Interpretation   None    ED Course      Medications Administered   Medications   acetaminophen (TYLENOL) tablet 650 mg (has no administration in time range)     Or   acetaminophen (TYLENOL) Suppository 650 mg (has no administration in time range)   senna-docusate (SENOKOT-S/PERICOLACE) 8.6-50 MG per tablet 1 tablet (has no administration in time range)     Or   senna-docusate (SENOKOT-S/PERICOLACE) 8.6-50 MG per tablet 2 tablet (has no administration in time range)   polyethylene glycol (MIRALAX) Packet 17 g (has no administration in time range)   ondansetron (ZOFRAN ODT) ODT tab 4 mg (has no administration in time range)     Or   ondansetron (ZOFRAN) injection 4 mg (has no administration in time range)   dextrose 5% and 0.45% NaCl + KCl 20 mEq/L infusion ( Intravenous $New Bag 8/2/24 0101)   OLANZapine zydis (zyPREXA) ODT tab 5-10 mg (has no administration in time range)     Or   haloperidol lactate (HALDOL) injection 2.5-5 mg (has no administration in time range)   flumazenil (ROMAZICON) injection 0.2 mg (has no administration in time range)   melatonin tablet 5 mg (has no administration in time range)   gabapentin (NEURONTIN) capsule 900 mg (has no administration in time range)   gabapentin (NEURONTIN) capsule 600 mg (has no administration in time range)   gabapentin (NEURONTIN) capsule 300 mg (has no administration in time range)   gabapentin (NEURONTIN) capsule 100 mg (has no administration in time range)   LORazepam (ATIVAN) tablet 1-2 mg ( Oral See Alternative 8/2/24 0030)     Or   LORazepam (ATIVAN) injection 1-2 mg (2 mg Intravenous $Given 8/2/24 0030)   thiamine (B-1) tablet 100 mg (100 mg Oral $Given 8/2/24 0042)   folic acid (FOLVITE) tablet  1 mg (has no administration in time range)   multivitamin w/minerals (THERA-VIT-M) tablet 1 tablet (has no administration in time range)   LORazepam (ATIVAN) injection 0.5 mg (0.5 mg Intravenous $Given 8/1/24 2012)   sodium chloride 0.9% BOLUS 1,000 mL (0 mLs Intravenous Stopped 8/1/24 2207)   potassium chloride (KLOR-CON) Packet 40 mEq (40 mEq Oral $Given 8/1/24 2133)   diazepam (VALIUM) tablet 10 mg (10 mg Oral $Given 8/1/24 2312)   gabapentin (NEURONTIN) tablet 1,200 mg (1,200 mg Oral $Given 8/2/24 0042)       Procedures   None     Discussion of Management   None    ED Course   ED Course as of 08/02/24 0205   Thu Aug 01, 2024   1939 I obtained history and examined the patient as noted above.    2233 I rechecked and updated the patient.    Fri Aug 02, 2024   0006 recheck   0205 Spoke with Dr. Degroot who accepts admission        Additional Documentation  None    Medical Decision Making / Diagnosis     CMS Diagnoses: None    MIPS       None    MDM   Jacquie Henderson is a 34 year old female who presents for evaluation of possible alcohol withdrawal. Associated symptoms include headache, nausea without vomiting, tremulousness, anxiety.  Their exam and vitals are significant for mild hand tremors.  Patient lives alone and has plan to abruptly stop drinking alcohol even though she knows this could be dangerous and potentially fatal. She is not amenable to detox.  I discussed with her if this is not a safe plan for discharge so recommend hospitalization for withdrawal.   Based on history and exam I recommended inpatient management.  Admitted to hospitalist as above      Disposition   The patient was discharged.     Diagnosis     ICD-10-CM    1. Alcohol withdrawal syndrome without complication (H)  F10.930           Scribe Disclosure:  I, Dakota Yost, am serving as a scribe at 7:32 PM on 8/1/2024 to document services personally performed by Destini Montgomery DO based on my observations and the provider's statements  to me.       Destini Montgomery,   08/02/24 0201
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
## 2024-08-02 NOTE — PLAN OF CARE
"PRIMARY DIAGNOSIS: Alcohol Withdrawal  OUTPATIENT/OBSERVATION GOALS TO BE MET BEFORE DISCHARGE:  ADLs back to baseline: Yes    Activity and level of assistance: Up with standby assistance.    Pain status: Pain free.    Return to near baseline physical activity: Yes     Discharge Planner Nurse   Safe discharge environment identified: Yes  Barriers to discharge: Yes      Patient continues to experience alcohol withdrawal symptoms. CIWA scores have been 8,16, 1 (patient asleep), 8. PRN ativan given per protocol orders. PIV infusing D5 0.45 NS 20 Kcl at 125 mL/hr. On RN managed K, Mag, Phos protocols- K replaced, recheck scheduled for 1745, Mag and Phos recheck tomorrow. Patient up SBA, on room air, on a regular diet.     BP (!) 132/90 (BP Location: Left arm)   Pulse 93   Temp 97.9  F (36.6  C) (Oral)   Resp 18   Ht 1.702 m (5' 7\")   Wt 70.6 kg (155 lb 11.2 oz)   SpO2 98%   BMI 24.39 kg/m       Please review provider order for any additional goals.   Nurse to notify provider when observation goals have been met and patient is ready for discharge.    Goal Outcome Evaluation:      Plan of Care Reviewed With: patient, grandparent    Overall Patient Progress: improvingOverall Patient Progress: improving    Outcome Evaluation: Continue CIWA protocol. PRN ativan given. Patient continues to display symptoms of alcohol withdrawal.      Problem: Adult Inpatient Plan of Care  Goal: Plan of Care Review  Description: The Plan of Care Review/Shift note should be completed every shift.  The Outcome Evaluation is a brief statement about your assessment that the patient is improving, declining, or no change.  This information will be displayed automatically on your shift  note.  8/2/2024 1740 by Yvonne Flores, RN  Outcome: Progressing  Flowsheets (Taken 8/2/2024 1740)  Outcome Evaluation: Continue CIWA protocol. PRN ativan given. Patient continues to display symptoms of alcohol withdrawal.  Plan of Care Reviewed With:   " "patient   grandparent  Overall Patient Progress: improving  8/2/2024 1217 by Yvonne Flores RN  Outcome: Progressing  Flowsheets (Taken 8/2/2024 1217)  Outcome Evaluation: CIWA score of 8- 1 mg PO ativan given  Plan of Care Reviewed With:   patient   grandparent  Overall Patient Progress: improving  Goal: Patient-Specific Goal (Individualized)  Description: You can add care plan individualizations to a care plan. Examples of Individualization might be:  \"Parent requests to be called daily at 9am for status\", \"I have a hard time hearing out of my right ear\", or \"Do not touch me to wake me up as it startles  me\".  8/2/2024 1740 by Yvonne Flores RN  Outcome: Progressing  8/2/2024 1217 by Yvonne Flores RN  Outcome: Progressing  Goal: Absence of Hospital-Acquired Illness or Injury  8/2/2024 1740 by Yvonne Flores RN  Outcome: Progressing  8/2/2024 1217 by Yvonne Flores RN  Outcome: Progressing  Intervention: Identify and Manage Fall Risk  Recent Flowsheet Documentation  Taken 8/2/2024 1151 by Yvonne Flores RN  Safety Promotion/Fall Prevention:   activity supervised   assistive device/personal items within reach   clutter free environment maintained   lighting adjusted   nonskid shoes/slippers when out of bed   room organization consistent   safety round/check completed   treat reversible contributory factors   treat underlying cause  Intervention: Prevent Skin Injury  Recent Flowsheet Documentation  Taken 8/2/2024 1151 by Yvonne Flores RN  Body Position: position changed independently  Intervention: Prevent Infection  Recent Flowsheet Documentation  Taken 8/2/2024 1151 by Yvonne Flores RN  Infection Prevention:   hand hygiene promoted   single patient room provided  Goal: Optimal Comfort and Wellbeing  8/2/2024 1740 by Yvonne Flores RN  Outcome: Progressing  8/2/2024 1217 by Yvonne Flores RN  Outcome: Progressing  Goal: Readiness for Transition of " Care  8/2/2024 1740 by Yvonne Flores, RN  Outcome: Progressing  8/2/2024 1217 by Yvonne Flores RN  Outcome: Progressing  Intervention: Mutually Develop Transition Plan  Recent Flowsheet Documentation  Taken 8/2/2024 1152 by Yvonne Flores, RN  Transportation Concerns: none  Patient/Family Anticipates Transition to: home with family  Equipment Currently Used at Home: none     Problem: Alcohol Withdrawal  Goal: Alcohol Withdrawal Symptom Control  8/2/2024 1740 by Yvonne Flores, RN  Outcome: Progressing  8/2/2024 1217 by Yvonne Flores, RN  Outcome: Progressing  Goal: Readiness for Change Identified  8/2/2024 1740 by Yvonne Flores RN  Outcome: Progressing  8/2/2024 1217 by Yvonne Flores, RN  Outcome: Progressing

## 2024-08-02 NOTE — ED NOTES
Pt searched by security. Pouch and phone at bedside. Pt is calm and cooperative.  Warm blanket offered. Patient accepting.

## 2024-08-02 NOTE — PLAN OF CARE
"ROOM # 233    Living Situation (if not independent, order SW consult): Home with GrandmotherTori  Facility name:  : Manuel Henderson, brother    Activity level at baseline: Ind  Activity level on admit: SBA    Who will be transporting you at discharge: Jackson or Tori    Patient registered to observation; given Patient Bill of Rights; given the opportunity to ask questions about observation status and their plan of care.  Patient has been oriented to the observation room, bathroom and call light is in place.    Discussed discharge goals and expectations with patient/family.     OBSERVATION patient IN TIME: 1145 am    /88 (BP Location: Left arm)   Pulse 112   Temp 99  F (37.2  C) (Oral)   Resp 20   Ht 1.702 m (5' 7\")   Wt 70.6 kg (155 lb 11.2 oz)   SpO2 96%   BMI 24.39 kg/m              "

## 2024-08-02 NOTE — ED NOTES
[facility]  ED Nurse Handoff Report    ED Chief complaint: Alcohol Intoxication  . ED Diagnosis:   Final diagnoses:   Alcohol withdrawal syndrome without complication (H)       Allergies: No Known Allergies    Code Status: Full Code    Activity level - Baseline/Home:  independent.  Activity Level - Current:   independent.   Lift room needed: No.   Bariatric: No   Needed: No   Isolation: No.   Infection: Not Applicable.     Respiratory status: Room air    Vital Signs (within 30 minutes):   Vitals:    08/01/24 2200 08/01/24 2230 08/01/24 2300 08/02/24 0000   BP: 131/87 133/86 123/84 (!) 120/95   Pulse: 112 (!) 121 113 117   Resp:       Temp:       TempSrc:       SpO2: 97% 96% 97% 98%   Weight:       Height:           Cardiac Rhythm:  ,      Pain level:    Patient confused: No.   Patient Falls Risk: bed/chair alarm on, nonskid shoes/slippers when out of bed, arm band in place, patient and family education, and assistive device/personal items within reach.   Elimination Status: Has voided     Patient Report - Initial Complaint: Alcohol use disorder, alcohol intoxication, and evolving alcohol withdrawal: The patient is somewhat vague about the extent of her alcohol use.  She does report drinking on a regular basis for the past several years.  She denies drinking every day.  She tells me that she drinks variable amounts, sometimes as little as just a few beers but sometimes more excessive like a pint of vodka.  Her last drink was at 4 PM.  Her brother came to see her for essentially a wellness visit and found her altered and called an ambulance which brought her to the hospital.  Here she is fully oriented and clinically sober although recorded a blood alcohol level of 0.38 on arrival.  She already has tremors and is scoring excessively on the CIWA protocol so she will be admitted for management of evolving withdrawal symptoms.  We will start the scheduled/tapering gabapentin protocol as  well as CIWA scoring with trigger dose lorazepam.  She will receive thiamine, folate, and MVI.  She does appear motivated to quit drinking so we will request a CD/SWI consultation to provide resources.  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  She has also never attended an AA meeting.  Hypokalemia: Start the electrolyte replacement protocols.  Anion gap metabolic acidosis: Likely alcoholic/starvation ketosis.  Continue fluid resuscitation and dextrose administration with electrolyte replacement.  Focused Assessment:   Abnormal Results:   Labs Ordered and Resulted from Time of ED Arrival to Time of ED Departure   ETHYL ALCOHOL LEVEL - Abnormal       Result Value    Alcohol ethyl 0.38 (*)    COMPREHENSIVE METABOLIC PANEL - Abnormal    Sodium 140      Potassium 3.3 (*)     Carbon Dioxide (CO2) 21 (*)     Anion Gap 20 (*)     Urea Nitrogen 3.0 (*)     Creatinine 0.65      GFR Estimate >90      Calcium 8.7 (*)     Chloride 99      Glucose 117 (*)     Alkaline Phosphatase 66      AST 35      ALT 22      Protein Total 7.9      Albumin 4.6      Bilirubin Total 0.4     MAGNESIUM - Normal    Magnesium 1.8     PHOSPHORUS - Normal    Phosphorus 2.7     HCG QUALITATIVE PREGNANCY - Normal    hCG Serum Qualitative Negative     MAGNESIUM - Normal    Magnesium 1.8     PHOSPHORUS - Normal    Phosphorus 3.0     CBC WITH PLATELETS AND DIFFERENTIAL    WBC Count 4.2      RBC Count 4.50      Hemoglobin 14.0      Hematocrit 39.5      MCV 88      MCH 31.1      MCHC 35.4      RDW 12.5      Platelet Count 221      % Neutrophils 58      % Lymphocytes 31      % Monocytes 10      % Eosinophils 0      % Basophils 1      % Immature Granulocytes 0      NRBCs per 100 WBC 0      Absolute Neutrophils 2.5      Absolute Lymphocytes 1.3      Absolute Monocytes 0.4      Absolute Eosinophils 0.0      Absolute Basophils 0.0      Absolute Immature Granulocytes 0.0       Absolute NRBCs 0.0          No orders to display       Treatments provided: D5W + 0.45NS+20Kcl at 125 ml/h, ativan given   Family Comments:   OBS brochure/video discussed/provided to patient:  No  ED Medications:   Medications   acetaminophen (TYLENOL) tablet 650 mg (has no administration in time range)     Or   acetaminophen (TYLENOL) Suppository 650 mg (has no administration in time range)   senna-docusate (SENOKOT-S/PERICOLACE) 8.6-50 MG per tablet 1 tablet (has no administration in time range)     Or   senna-docusate (SENOKOT-S/PERICOLACE) 8.6-50 MG per tablet 2 tablet (has no administration in time range)   polyethylene glycol (MIRALAX) Packet 17 g (has no administration in time range)   ondansetron (ZOFRAN ODT) ODT tab 4 mg (has no administration in time range)     Or   ondansetron (ZOFRAN) injection 4 mg (has no administration in time range)   dextrose 5% and 0.45% NaCl + KCl 20 mEq/L infusion ( Intravenous $New Bag 8/2/24 0101)   OLANZapine zydis (zyPREXA) ODT tab 5-10 mg (has no administration in time range)     Or   haloperidol lactate (HALDOL) injection 2.5-5 mg (has no administration in time range)   flumazenil (ROMAZICON) injection 0.2 mg (has no administration in time range)   melatonin tablet 5 mg (has no administration in time range)   gabapentin (NEURONTIN) capsule 900 mg (has no administration in time range)   gabapentin (NEURONTIN) capsule 600 mg (has no administration in time range)   gabapentin (NEURONTIN) capsule 300 mg (has no administration in time range)   gabapentin (NEURONTIN) capsule 100 mg (has no administration in time range)   LORazepam (ATIVAN) tablet 1-2 mg ( Oral See Alternative 8/2/24 0030)     Or   LORazepam (ATIVAN) injection 1-2 mg (2 mg Intravenous $Given 8/2/24 0030)   thiamine (B-1) tablet 100 mg (100 mg Oral $Given 8/2/24 0042)   folic acid (FOLVITE) tablet 1 mg (has no administration in time range)   multivitamin w/minerals (THERA-VIT-M) tablet 1 tablet (has no  administration in time range)   LORazepam (ATIVAN) injection 0.5 mg (0.5 mg Intravenous $Given 8/1/24 2012)   sodium chloride 0.9% BOLUS 1,000 mL (0 mLs Intravenous Stopped 8/1/24 2207)   potassium chloride (KLOR-CON) Packet 40 mEq (40 mEq Oral $Given 8/1/24 2133)   diazepam (VALIUM) tablet 10 mg (10 mg Oral $Given 8/1/24 2312)   gabapentin (NEURONTIN) tablet 1,200 mg (1,200 mg Oral $Given 8/2/24 0042)       Drips infusing:  Yes D5W+.45NS+20KCL  ml/h  For the majority of the shift this patient was Green.   Interventions performed were CIWA, fluids    Sepsis treatment initiated: No    Cares/treatment/interventions/medications to be completed following ED care: CIWA protocol    ED Nurse Name: Camilla Mcintosh RN  1:28 AM     RECEIVING UNIT ED HANDOFF REVIEW    Above ED Nurse Handoff Report was reviewed: Yes  Reviewed by: Yvonne Flores RN on August 2, 2024 at 10:46 AM
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
## 2024-08-02 NOTE — DISCHARGE INSTRUCTIONS
Met with patient to discuss possible [facility] treatment options and to possibly complete an assessment. Assessment has been completed at this time and patient is being recommended:         Recommendations: Intensive Outpatient Treatment     Patient should call the below programs when ready to participate in IOP treatment.  Assessment is good for 1 month. Expires 9/3/24    Referrals/ Alternatives:  [facility]  [email or web address]   [address], [address],  [place] [address] [phone]     [facility]           [address]  [place] [address]  Office: [phone]  Fax: [phone]    [facility],  [phone]     Referrals are being made at this time.  Patient is aware of the referrals and is in agreement.

## 2024-08-03 VITALS
DIASTOLIC BLOOD PRESSURE: 103 MMHG | SYSTOLIC BLOOD PRESSURE: 155 MMHG | HEART RATE: 110 BPM | HEIGHT: 67 IN | TEMPERATURE: 98.4 F | OXYGEN SATURATION: 98 % | RESPIRATION RATE: 16 BRPM | BODY MASS INDEX: 24.44 KG/M2 | WEIGHT: 155.7 LBS

## 2024-08-03 LAB
ANION GAP SERPL CALCULATED.3IONS-SCNC: 12 MMOL/L (ref 7–15)
BUN SERPL-MCNC: 4.7 MG/DL (ref 6–20)
CALCIUM SERPL-MCNC: 9.1 MG/DL (ref 8.8–10.4)
CHLORIDE SERPL-SCNC: 103 MMOL/L (ref 98–107)
CREAT SERPL-MCNC: 0.6 MG/DL (ref 0.51–0.95)
EGFRCR SERPLBLD CKD-EPI 2021: >90 ML/MIN/1.73M2
GLUCOSE SERPL-MCNC: 106 MG/DL (ref 70–99)
HCO3 SERPL-SCNC: 22 MMOL/L (ref 22–29)
MAGNESIUM SERPL-MCNC: 1.7 MG/DL (ref 1.7–2.3)
PHOSPHATE SERPL-MCNC: 2.8 MG/DL (ref 2.5–4.5)
POTASSIUM SERPL-SCNC: 4.4 MMOL/L (ref 3.4–5.3)
POTASSIUM SERPL-SCNC: 4.4 MMOL/L (ref 3.4–5.3)
SODIUM SERPL-SCNC: 137 MMOL/L (ref 135–145)

## 2024-08-03 PROCEDURE — 84132 ASSAY OF SERUM POTASSIUM: CPT | Performed by: INTERNAL MEDICINE

## 2024-08-03 PROCEDURE — 84100 ASSAY OF PHOSPHORUS: CPT | Performed by: INTERNAL MEDICINE

## 2024-08-03 PROCEDURE — 80048 BASIC METABOLIC PNL TOTAL CA: CPT | Performed by: STUDENT IN AN ORGANIZED HEALTH CARE EDUCATION/TRAINING PROGRAM

## 2024-08-03 PROCEDURE — 36415 COLL VENOUS BLD VENIPUNCTURE: CPT | Performed by: INTERNAL MEDICINE

## 2024-08-03 PROCEDURE — 250N000013 HC RX MED GY IP 250 OP 250 PS 637: Performed by: HOSPITALIST

## 2024-08-03 PROCEDURE — 99239 HOSP IP/OBS DSCHRG MGMT >30: CPT | Performed by: STUDENT IN AN ORGANIZED HEALTH CARE EDUCATION/TRAINING PROGRAM

## 2024-08-03 PROCEDURE — 83735 ASSAY OF MAGNESIUM: CPT | Performed by: INTERNAL MEDICINE

## 2024-08-03 PROCEDURE — G0378 HOSPITAL OBSERVATION PER HR: HCPCS

## 2024-08-03 PROCEDURE — 258N000003 HC RX IP 258 OP 636: Performed by: HOSPITALIST

## 2024-08-03 RX ORDER — MULTIVITAMIN
1 TABLET ORAL DAILY
Qty: 90 TABLET | Refills: 3 | Status: SHIPPED | OUTPATIENT
Start: 2024-08-03

## 2024-08-03 RX ORDER — DISULFIRAM 250 MG/1
250 TABLET ORAL DAILY
Qty: 14 TABLET | Refills: 0 | Status: SHIPPED | OUTPATIENT
Start: 2024-08-03

## 2024-08-03 RX ADMIN — GABAPENTIN 900 MG: 300 CAPSULE ORAL at 01:07

## 2024-08-03 RX ADMIN — GABAPENTIN 900 MG: 300 CAPSULE ORAL at 08:55

## 2024-08-03 RX ADMIN — Medication 1 TABLET: at 08:55

## 2024-08-03 RX ADMIN — THIAMINE HCL TAB 100 MG 100 MG: 100 TAB at 08:55

## 2024-08-03 RX ADMIN — POTASSIUM CHLORIDE, DEXTROSE MONOHYDRATE AND SODIUM CHLORIDE: 150; 5; 450 INJECTION, SOLUTION INTRAVENOUS at 03:53

## 2024-08-03 RX ADMIN — GABAPENTIN 900 MG: 300 CAPSULE ORAL at 15:01

## 2024-08-03 RX ADMIN — FOLIC ACID 1 MG: 1 TABLET ORAL at 08:55

## 2024-08-03 ASSESSMENT — ACTIVITIES OF DAILY LIVING (ADL)
ADLS_ACUITY_SCORE: 21

## 2024-08-03 NOTE — PLAN OF CARE
PRIMARY DIAGNOSIS: Alcohol Withdrawl  OUTPATIENT/OBSERVATION GOALS TO BE MET BEFORE DISCHARGE:  ADLs back to baseline: No    Activity and level of assistance: Up with standby assistance.    Pain status: Pain free.    Return to near baseline physical activity: Yes     Discharge Planner Nurse   Safe discharge environment identified: No  Barriers to discharge: Yes       Entered by: Winnie Alejandro RN 08/03/2024 2:47 AM     Please review provider order for any additional goals.   Nurse to notify provider when observation goals have been met and patient is ready for discharge.    Assumed care [id number]. A&Ox4. SBA when OOB. On RA. On a regular diet. CIWA protocol continued. PIV in L arm is infusing. Denies pain/discomfort. Plan of care ongoing.

## 2024-08-03 NOTE — PLAN OF CARE
PRIMARY DIAGNOSIS: Alcohol withdrawal  OUTPATIENT/OBSERVATION GOALS TO BE MET BEFORE DISCHARGE:  ADLs back to baseline: Yes     Activity and level of assistance: Up with standby assistance.     Pain status: Pain free.     Return to near baseline physical activity: Yes             Discharge Planner Nurse  Safe discharge environment identified: Yes  Barriers to discharge: Yes       Entered by: Carey De Leon RN 08/03/2024 12:58 PM        Please review provider order for any additional goals.   Nurse to notify provider when observation goals have been met and patient is ready for discharge.

## 2024-08-03 NOTE — PLAN OF CARE
PRIMARY DIAGNOSIS: Alcohol withdrawal  OUTPATIENT/OBSERVATION GOALS TO BE MET BEFORE DISCHARGE:  ADLs back to baseline: Yes    Activity and level of assistance: Up with standby assistance.    Pain status: Pain free.    Return to near baseline physical activity: Yes     Discharge Planner Nurse   Safe discharge environment identified: Yes  Barriers to discharge: Yes       Entered by: Carey De Leon RN 08/03/2024 10:16 AM     Please review provider order for any additional goals.   Nurse to notify provider when observation goals have been met and patient is ready for discharge.

## 2024-08-03 NOTE — PLAN OF CARE
"PRIMARY DIAGNOSIS: Alcohol Withdrawl  OUTPATIENT/OBSERVATION GOALS TO BE MET BEFORE DISCHARGE:  ADLs back to baseline: No    Activity and level of assistance: Up with standby assistance.    Pain status: Pain free.    Return to near baseline physical activity: Yes     Discharge Planner Nurse   Safe discharge environment identified: No  Barriers to discharge: Yes       Entered by: Winnie Alejandro RN 08/03/2024 2:45 AM     Please review provider order for any additional goals.   Nurse to notify provider when observation goals have been met and patient is ready for discharge.    Assumed care [id number]. A&Ox4. SBA when OOB. On RA. On a regular diet. CIWA protocol continued. PIV in L arm was dislodged and new PIV in L arm was placed and is infusing. C/O mild pain but declined any pain interventions. Plan of care ongoing.     Goal Outcome Evaluation:      Plan of Care Reviewed With: patient    Overall Patient Progress: improvingOverall Patient Progress: improving    Outcome Evaluation: A&Ox4, CIWA protocol continued, treatment reccomended upon discharge      Problem: Adult Inpatient Plan of Care  Goal: Plan of Care Review  Description: The Plan of Care Review/Shift note should be completed every shift.  The Outcome Evaluation is a brief statement about your assessment that the patient is improving, declining, or no change.  This information will be displayed automatically on your shift  note.  Outcome: Progressing  Flowsheets (Taken 8/3/2024 0244)  Outcome Evaluation: A&Ox4, CIWA protocol continued, treatment reccomended upon discharge  Plan of Care Reviewed With: patient  Overall Patient Progress: improving  Goal: Patient-Specific Goal (Individualized)  Description: You can add care plan individualizations to a care plan. Examples of Individualization might be:  \"Parent requests to be called daily at 9am for status\", \"I have a hard time hearing out of my right ear\", or \"Do not touch me to wake me up as it " "startles  me\".  Outcome: Progressing  Goal: Absence of Hospital-Acquired Illness or Injury  Outcome: Progressing  Intervention: Identify and Manage Fall Risk  Recent Flowsheet Documentation  Taken 8/2/2024 2130 by Winnie Alejandro, RN  Safety Promotion/Fall Prevention:   activity supervised   lighting adjusted   nonskid shoes/slippers when out of bed   room door open   room near nurse's station   safety round/check completed  Intervention: Prevent Skin Injury  Recent Flowsheet Documentation  Taken 8/2/2024 2130 by Winnie Alejandro, RN  Body Position: position changed independently  Intervention: Prevent and Manage VTE (Venous Thromboembolism) Risk  Recent Flowsheet Documentation  Taken 8/2/2024 2130 by Winnie Alejandro, RN  VTE Prevention/Management: (Ambulatory) SCDs off (sequential compression devices)  Intervention: Prevent Infection  Recent Flowsheet Documentation  Taken 8/2/2024 2130 by Winnie Alejandro, RN  Infection Prevention:   equipment surfaces disinfected   hand hygiene promoted   personal protective equipment utilized   rest/sleep promoted   single patient room provided  Goal: Optimal Comfort and Wellbeing  Outcome: Progressing  Goal: Readiness for Transition of Care  Outcome: Progressing     Problem: Alcohol Withdrawal  Goal: Alcohol Withdrawal Symptom Control  Outcome: Progressing  Goal: Readiness for Change Identified  Outcome: Progressing     "

## 2024-08-03 NOTE — DISCHARGE SUMMARY
[facility]  Hospitalist Discharge Summary      Date of Admission:  8/1/2024  Date of Discharge:  8/3/2024  Discharging Provider: Cortney Hunt DO  Discharge Service: Hospitalist Service    Discharge Diagnoses   Alcohol use disorder  Alcohol intoxication, resolved   Evolving alcohol withdrawal, resolved   Hypokalemia, resolved   Anion gap metabolic acidosis- likely alcoholic/starvation ketosis     Clinically Significant Risk Factors          Follow-ups Needed After Discharge   Follow-up Appointments     Follow-up and recommended labs and tests       Follow up with primary care provider, [facility], within 7 days for hospital follow- up.  Refill of antabuse - 2   week supply provided        CMP at follow up, refill script for antabuse     Unresulted Labs Ordered in the Past 30 Days of this Admission       No orders found from 7/2/2024 to 8/2/2024.        These results will be followed up by PCP     Discharge Disposition   Discharged to home  Condition at discharge: Stable    Hospital Course   Jacquie Henderson is a 34 year old female with a history of alcohol use disorder who presented with evolving alcohol withdrawal.     Alcohol use disorder, alcohol intoxication, and evolving alcohol withdrawal: The patient is somewhat vague about the extent of her alcohol use.  She does report drinking on a regular basis for the past several years.  She denies drinking every day.  She tells me that she drinks variable amounts, sometimes as little as just a few beers but sometimes more excessive like a pint of vodka.  Her last drink was at 4 PM the night prior to admission.  Her brother came to see her for essentially a wellness visit and found her altered and called an ambulance which brought her to the hospital.  Here she was fully oriented and clinically sober although recorded a blood alcohol level of 0.38 on arrival.  She already had tremors and was scoring excessively  on the CIWA protocol so she was admitted for management of evolving withdrawal symptoms.  She was started on the scheduled/tapering gabapentin protocol as well as CIWA scoring with trigger dose lorazepam.  She received thiamine, folate, and MVI.  She was monitored and  clinically she was no longer in withdrawal on time of discharge. No longer exhibiting signs of withdrawal and remained clinically stable. She is motivated to quit drinking and CD/SWI consultation was requested to provide resources, she also requested antabuse and a starting dose of 250 mg was provided on discharge for 2 weeks. She should follow up with outpatient provider for refills. We reviewed side effects of this medication and she wanted to try it. No contraindications to use. Patient and her father understood and agreed to the plan. All questions were answered. She was discharged in stable condition with plans for outpatient treatment, PCP follow up and initiation of disulfiram.         Consultations This Hospital Stay   CARE MANAGEMENT / SOCIAL WORK IP CONSULT  CHEMICAL DEPENDENCY IP CONSULT    Code Status   Full Code    Time Spent on this Encounter   I, Cortney Hunt DO, personally saw the patient today and spent greater than 30 minutes discharging this patient.       Cortney Hunt DO  [facility] OBSERVATION DEPT  [address] [place] [address]  Phone: [phone]  ______________________________________________________________________    Physical Exam   Vital Signs: Temp: 98.4  F (36.9  C) Temp src: Oral BP: (!) 136/95 Pulse: 96   Resp: 16 SpO2: 98 % O2 Device: None (Room air)    Weight: 155 lbs 11.2 oz  Constitutional: awake, alert, cooperative, no apparent distress, and appears stated age  HEENT: Normocephalic, atraumatic  Respiratory: Normal work of breathing, clear to auscultation   Cardiovascular: Regular rate and rhythm, no murmurs appreciated  GI: Soft and nontender to palpation, nondistended,  no rebound or guarding  Skin: normal skin color, texture, turgor  Musculoskeletal: No deformities, no edema present  Neurologic: Alert and oriented, no focal deficits   No tongue fasciculations, no tremor noted   Neuropsychiatric: General: normal, calm and normal eye contact     Primary Care Physician   [facility]    Discharge Orders      Comprehensive metabolic panel     Reason for your hospital stay    Alcohol withdrawal     Follow-up and recommended labs and tests     Follow up with primary care provider, [facility], within 7 days for hospital follow- up.  Refill of antabuse - 2 week supply provided     Activity    Your activity upon discharge: activity as tolerated     Diet    Follow this diet upon discharge: Orders Placed This Encounter      Regular Diet Adult       Significant Results and Procedures   Most Recent 3 CBC's:  Recent Labs   Lab Test 08/01/24 2006   WBC 4.2   HGB 14.0   MCV 88        Most Recent 3 BMP's:  Recent Labs   Lab Test 08/03/24  0649 08/02/24  1811 08/01/24 2006   NA  --   --  140   POTASSIUM 4.4 4.0 3.3*   CHLORIDE  --   --  99   CO2  --   --  21*   BUN  --   --  3.0*   CR  --   --  0.65   ANIONGAP  --   --  20*   LYLE  --   --  8.7*   GLC  --   --  117*     Most Recent 2 LFT's:  Recent Labs   Lab Test 08/01/24 2006   AST 35   ALT 22   ALKPHOS 66   BILITOTAL 0.4   , No results found for this or any previous visit.    Discharge Medications   Current Discharge Medication List        START taking these medications    Details   disulfiram (ANTABUSE) 250 MG tablet Take 1 tablet (250 mg) by mouth daily  Qty: 14 tablet, Refills: 0    Associated Diagnoses: Alcohol withdrawal syndrome without complication (H); Alcohol withdrawal syndrome with complication (H)      multivitamin (ONE-DAILY) tablet Take 1 tablet by mouth daily  Qty: 90 tablet, Refills: 3    Associated Diagnoses: Alcohol withdrawal syndrome without complication (H);  Alcohol withdrawal syndrome with complication (H)           CONTINUE these medications which have NOT CHANGED    Details   butalbital-acetaminophen-caffeine (ESGIC) -40 MG tablet Take 1 tablet by mouth every 6 hours as needed for migraine      melatonin 5 MG tablet Take 5 mg by mouth nightly as needed for sleep      OVER-THE-COUNTER CBD w/ melatonin - take 1-2 at bedtime as needed for sleep      Probiotic Product (PROBIOTIC BLEND) CAPS Take 1 capsule by mouth daily           Allergies   No Known Allergies

## 2024-08-03 NOTE — PLAN OF CARE
Goal Outcome Evaluation:      Plan of Care Reviewed With: patient    Overall Patient Progress: improvingOverall Patient Progress: improving    Outcome Evaluation: Pt A&O. Tylenol recieved for headache, on scheduled gabapentin. Tolerating reg diet, denies N/V. Transfers ind. AUO. CIWA scores 4,3.    Patient's After Visit Summary was reviewed with patient and/or father.   Patient verbalized understanding of After Visit Summary, recommended follow up and was given an opportunity to ask questions.   Discharge medications sent home with patient/family: YES - antabuse and multivitamin.   Discharged with father

## 2024-08-05 ENCOUNTER — TELEPHONE (OUTPATIENT)
Dept: BEHAVIORAL HEALTH | Facility: CLINIC | Age: 34
End: 2024-08-05
Payer: COMMERCIAL

## 2024-08-05 NOTE — TELEPHONE ENCOUNTER
Spoke to client regarding IOP referral, client is seeking MH services primarily focusing on grief counseling. She is going to call her current behavioral health provider to seek services and follow up if she needs any additional support

## 2024-11-08 ENCOUNTER — APPOINTMENT (OUTPATIENT)
Dept: GENERAL RADIOLOGY | Facility: CLINIC | Age: 34
End: 2024-11-08
Attending: STUDENT IN AN ORGANIZED HEALTH CARE EDUCATION/TRAINING PROGRAM
Payer: COMMERCIAL

## 2024-11-08 ENCOUNTER — HOSPITAL ENCOUNTER (EMERGENCY)
Facility: CLINIC | Age: 34
Discharge: HOME | End: 2024-11-08
Attending: STUDENT IN AN ORGANIZED HEALTH CARE EDUCATION/TRAINING PROGRAM | Admitting: STUDENT IN AN ORGANIZED HEALTH CARE EDUCATION/TRAINING PROGRAM
Payer: COMMERCIAL

## 2024-11-08 VITALS
TEMPERATURE: 98.8 F | WEIGHT: 143.08 LBS | RESPIRATION RATE: 17 BRPM | DIASTOLIC BLOOD PRESSURE: 82 MMHG | SYSTOLIC BLOOD PRESSURE: 130 MMHG | HEART RATE: 104 BPM | OXYGEN SATURATION: 96 % | BODY MASS INDEX: 22.46 KG/M2 | HEIGHT: 67 IN

## 2024-11-08 DIAGNOSIS — E87.29 ALCOHOLIC KETOACIDOSIS: ICD-10-CM

## 2024-11-08 DIAGNOSIS — F10.930 ALCOHOL WITHDRAWAL, UNCOMPLICATED (H): ICD-10-CM

## 2024-11-08 DIAGNOSIS — R74.01 TRANSAMINITIS: ICD-10-CM

## 2024-11-08 DIAGNOSIS — T73.0XXA STARVATION KETOACIDOSIS: ICD-10-CM

## 2024-11-08 DIAGNOSIS — E87.29 STARVATION KETOACIDOSIS: ICD-10-CM

## 2024-11-08 LAB
ALBUMIN SERPL BCG-MCNC: 5.1 G/DL (ref 3.5–5.2)
ALP SERPL-CCNC: 79 U/L (ref 40–150)
ALT SERPL W P-5'-P-CCNC: 323 U/L (ref 0–50)
ANION GAP SERPL CALCULATED.3IONS-SCNC: 16 MMOL/L (ref 7–15)
ANION GAP SERPL CALCULATED.3IONS-SCNC: 27 MMOL/L (ref 7–15)
AST SERPL W P-5'-P-CCNC: 435 U/L (ref 0–45)
ATRIAL RATE - MUSE: 118 BPM
ATRIAL RATE - MUSE: 96 BPM
B-OH-BUTYR SERPL-SCNC: 0.36 MMOL/L
B-OH-BUTYR SERPL-SCNC: 2.63 MMOL/L
BASOPHILS # BLD AUTO: 0.1 10E3/UL (ref 0–0.2)
BASOPHILS NFR BLD AUTO: 1 %
BILIRUB DIRECT SERPL-MCNC: 0.27 MG/DL (ref 0–0.3)
BILIRUB SERPL-MCNC: 1.2 MG/DL
BUN SERPL-MCNC: 11.3 MG/DL (ref 6–20)
BUN SERPL-MCNC: 8.1 MG/DL (ref 6–20)
CALCIUM SERPL-MCNC: 9 MG/DL (ref 8.8–10.4)
CALCIUM SERPL-MCNC: 9.3 MG/DL (ref 8.8–10.4)
CHLORIDE SERPL-SCNC: 84 MMOL/L (ref 98–107)
CHLORIDE SERPL-SCNC: 94 MMOL/L (ref 98–107)
CREAT SERPL-MCNC: 0.6 MG/DL (ref 0.51–0.95)
CREAT SERPL-MCNC: 0.63 MG/DL (ref 0.51–0.95)
DIASTOLIC BLOOD PRESSURE - MUSE: NORMAL MMHG
DIASTOLIC BLOOD PRESSURE - MUSE: NORMAL MMHG
EGFRCR SERPLBLD CKD-EPI 2021: >90 ML/MIN/1.73M2
EGFRCR SERPLBLD CKD-EPI 2021: >90 ML/MIN/1.73M2
EOSINOPHIL # BLD AUTO: 0 10E3/UL (ref 0–0.7)
EOSINOPHIL NFR BLD AUTO: 0 %
ERYTHROCYTE [DISTWIDTH] IN BLOOD BY AUTOMATED COUNT: 12.4 % (ref 10–15)
ETHANOL SERPL-MCNC: 0.22 G/DL
GLUCOSE SERPL-MCNC: 130 MG/DL (ref 70–99)
GLUCOSE SERPL-MCNC: 89 MG/DL (ref 70–99)
HCG SERPL QL: NEGATIVE
HCO3 SERPL-SCNC: 19 MMOL/L (ref 22–29)
HCO3 SERPL-SCNC: 25 MMOL/L (ref 22–29)
HCT VFR BLD AUTO: 43.7 % (ref 35–47)
HGB BLD-MCNC: 15.1 G/DL (ref 11.7–15.7)
HOLD SPECIMEN: NORMAL
HOLD SPECIMEN: NORMAL
IMM GRANULOCYTES # BLD: 0 10E3/UL
IMM GRANULOCYTES NFR BLD: 0 %
INR PPP: 0.96 (ref 0.85–1.15)
INTERPRETATION ECG - MUSE: NORMAL
INTERPRETATION ECG - MUSE: NORMAL
LIPASE SERPL-CCNC: 28 U/L (ref 13–60)
LYMPHOCYTES # BLD AUTO: 0.9 10E3/UL (ref 0.8–5.3)
LYMPHOCYTES NFR BLD AUTO: 19 %
MAGNESIUM SERPL-MCNC: 1.7 MG/DL (ref 1.7–2.3)
MCH RBC QN AUTO: 30.1 PG (ref 26.5–33)
MCHC RBC AUTO-ENTMCNC: 34.6 G/DL (ref 31.5–36.5)
MCV RBC AUTO: 87 FL (ref 78–100)
MONOCYTES # BLD AUTO: 0.3 10E3/UL (ref 0–1.3)
MONOCYTES NFR BLD AUTO: 6 %
NEUTROPHILS # BLD AUTO: 3.2 10E3/UL (ref 1.6–8.3)
NEUTROPHILS NFR BLD AUTO: 74 %
NRBC # BLD AUTO: 0 10E3/UL
NRBC BLD AUTO-RTO: 0 /100
P AXIS - MUSE: 56 DEGREES
P AXIS - MUSE: 60 DEGREES
PHOSPHATE SERPL-MCNC: 3.6 MG/DL (ref 2.5–4.5)
PLATELET # BLD AUTO: 259 10E3/UL (ref 150–450)
POTASSIUM SERPL-SCNC: 3.6 MMOL/L (ref 3.4–5.3)
POTASSIUM SERPL-SCNC: 3.8 MMOL/L (ref 3.4–5.3)
PR INTERVAL - MUSE: 120 MS
PR INTERVAL - MUSE: 124 MS
PROT SERPL-MCNC: 8.7 G/DL (ref 6.4–8.3)
QRS DURATION - MUSE: 88 MS
QRS DURATION - MUSE: 92 MS
QT - MUSE: 338 MS
QT - MUSE: 362 MS
QTC - MUSE: 457 MS
QTC - MUSE: 473 MS
R AXIS - MUSE: 57 DEGREES
R AXIS - MUSE: 66 DEGREES
RBC # BLD AUTO: 5.02 10E6/UL (ref 3.8–5.2)
SODIUM SERPL-SCNC: 130 MMOL/L (ref 135–145)
SODIUM SERPL-SCNC: 135 MMOL/L (ref 135–145)
SYSTOLIC BLOOD PRESSURE - MUSE: NORMAL MMHG
SYSTOLIC BLOOD PRESSURE - MUSE: NORMAL MMHG
T AXIS - MUSE: 32 DEGREES
T AXIS - MUSE: 49 DEGREES
TSH SERPL DL<=0.005 MIU/L-ACNC: 0.67 UIU/ML (ref 0.3–4.2)
VENTRICULAR RATE- MUSE: 118 BPM
VENTRICULAR RATE- MUSE: 96 BPM
WBC # BLD AUTO: 4.4 10E3/UL (ref 4–11)

## 2024-11-08 PROCEDURE — 71046 X-RAY EXAM CHEST 2 VIEWS: CPT

## 2024-11-08 PROCEDURE — 82248 BILIRUBIN DIRECT: CPT | Performed by: STUDENT IN AN ORGANIZED HEALTH CARE EDUCATION/TRAINING PROGRAM

## 2024-11-08 PROCEDURE — 84100 ASSAY OF PHOSPHORUS: CPT | Performed by: STUDENT IN AN ORGANIZED HEALTH CARE EDUCATION/TRAINING PROGRAM

## 2024-11-08 PROCEDURE — 96365 THER/PROPH/DIAG IV INF INIT: CPT

## 2024-11-08 PROCEDURE — 96374 THER/PROPH/DIAG INJ IV PUSH: CPT | Mod: 59

## 2024-11-08 PROCEDURE — 82310 ASSAY OF CALCIUM: CPT | Performed by: STUDENT IN AN ORGANIZED HEALTH CARE EDUCATION/TRAINING PROGRAM

## 2024-11-08 PROCEDURE — 84443 ASSAY THYROID STIM HORMONE: CPT | Performed by: STUDENT IN AN ORGANIZED HEALTH CARE EDUCATION/TRAINING PROGRAM

## 2024-11-08 PROCEDURE — 85610 PROTHROMBIN TIME: CPT | Performed by: STUDENT IN AN ORGANIZED HEALTH CARE EDUCATION/TRAINING PROGRAM

## 2024-11-08 PROCEDURE — 258N000003 HC RX IP 258 OP 636: Performed by: STUDENT IN AN ORGANIZED HEALTH CARE EDUCATION/TRAINING PROGRAM

## 2024-11-08 PROCEDURE — 36415 COLL VENOUS BLD VENIPUNCTURE: CPT | Performed by: STUDENT IN AN ORGANIZED HEALTH CARE EDUCATION/TRAINING PROGRAM

## 2024-11-08 PROCEDURE — 82010 KETONE BODYS QUAN: CPT | Performed by: STUDENT IN AN ORGANIZED HEALTH CARE EDUCATION/TRAINING PROGRAM

## 2024-11-08 PROCEDURE — 83690 ASSAY OF LIPASE: CPT | Performed by: STUDENT IN AN ORGANIZED HEALTH CARE EDUCATION/TRAINING PROGRAM

## 2024-11-08 PROCEDURE — 93005 ELECTROCARDIOGRAM TRACING: CPT

## 2024-11-08 PROCEDURE — 250N000011 HC RX IP 250 OP 636: Performed by: STUDENT IN AN ORGANIZED HEALTH CARE EDUCATION/TRAINING PROGRAM

## 2024-11-08 PROCEDURE — 83735 ASSAY OF MAGNESIUM: CPT | Performed by: STUDENT IN AN ORGANIZED HEALTH CARE EDUCATION/TRAINING PROGRAM

## 2024-11-08 PROCEDURE — 82077 ASSAY SPEC XCP UR&BREATH IA: CPT | Performed by: STUDENT IN AN ORGANIZED HEALTH CARE EDUCATION/TRAINING PROGRAM

## 2024-11-08 PROCEDURE — 84703 CHORIONIC GONADOTROPIN ASSAY: CPT | Performed by: STUDENT IN AN ORGANIZED HEALTH CARE EDUCATION/TRAINING PROGRAM

## 2024-11-08 PROCEDURE — 250N000013 HC RX MED GY IP 250 OP 250 PS 637: Performed by: STUDENT IN AN ORGANIZED HEALTH CARE EDUCATION/TRAINING PROGRAM

## 2024-11-08 PROCEDURE — 85025 COMPLETE CBC W/AUTO DIFF WBC: CPT | Performed by: STUDENT IN AN ORGANIZED HEALTH CARE EDUCATION/TRAINING PROGRAM

## 2024-11-08 PROCEDURE — 96366 THER/PROPH/DIAG IV INF ADDON: CPT

## 2024-11-08 PROCEDURE — 99285 EMERGENCY DEPT VISIT HI MDM: CPT | Mod: 25

## 2024-11-08 RX ORDER — MULTIPLE VITAMINS W/ MINERALS TAB 9MG-400MCG
1 TAB ORAL DAILY
Status: DISCONTINUED | OUTPATIENT
Start: 2024-11-08 | End: 2024-11-08 | Stop reason: HOSPADM

## 2024-11-08 RX ORDER — FLUMAZENIL 0.1 MG/ML
0.2 INJECTION, SOLUTION INTRAVENOUS
Status: DISCONTINUED | OUTPATIENT
Start: 2024-11-08 | End: 2024-11-08 | Stop reason: HOSPADM

## 2024-11-08 RX ORDER — DIAZEPAM 5 MG/1
10 TABLET ORAL EVERY 30 MIN PRN
Status: DISCONTINUED | OUTPATIENT
Start: 2024-11-08 | End: 2024-11-08 | Stop reason: HOSPADM

## 2024-11-08 RX ORDER — CLONIDINE HYDROCHLORIDE 0.1 MG/1
0.1 TABLET ORAL EVERY 8 HOURS SCHEDULED
Status: DISCONTINUED | OUTPATIENT
Start: 2024-11-08 | End: 2024-11-08 | Stop reason: HOSPADM

## 2024-11-08 RX ORDER — GABAPENTIN 300 MG/1
600 CAPSULE ORAL EVERY 8 HOURS
Status: DISCONTINUED | OUTPATIENT
Start: 2024-11-11 | End: 2024-11-08 | Stop reason: HOSPADM

## 2024-11-08 RX ORDER — ONDANSETRON 4 MG/1
4 TABLET, ORALLY DISINTEGRATING ORAL EVERY 8 HOURS PRN
Qty: 10 TABLET | Refills: 0 | Status: SHIPPED | OUTPATIENT
Start: 2024-11-08 | End: 2024-11-11

## 2024-11-08 RX ORDER — HALOPERIDOL 5 MG/ML
2.5-5 INJECTION INTRAMUSCULAR EVERY 6 HOURS PRN
Status: DISCONTINUED | OUTPATIENT
Start: 2024-11-08 | End: 2024-11-08 | Stop reason: HOSPADM

## 2024-11-08 RX ORDER — FOLIC ACID 1 MG/1
1 TABLET ORAL DAILY
Status: DISCONTINUED | OUTPATIENT
Start: 2024-11-08 | End: 2024-11-08 | Stop reason: HOSPADM

## 2024-11-08 RX ORDER — DIAZEPAM 10 MG/2ML
5-10 INJECTION, SOLUTION INTRAMUSCULAR; INTRAVENOUS EVERY 30 MIN PRN
Status: DISCONTINUED | OUTPATIENT
Start: 2024-11-08 | End: 2024-11-08 | Stop reason: HOSPADM

## 2024-11-08 RX ORDER — GABAPENTIN 300 MG/1
300 CAPSULE ORAL EVERY 8 HOURS
Status: DISCONTINUED | OUTPATIENT
Start: 2024-11-13 | End: 2024-11-08 | Stop reason: HOSPADM

## 2024-11-08 RX ORDER — GABAPENTIN 100 MG/1
100 CAPSULE ORAL EVERY 8 HOURS
Status: DISCONTINUED | OUTPATIENT
Start: 2024-11-15 | End: 2024-11-08 | Stop reason: HOSPADM

## 2024-11-08 RX ORDER — CHLORDIAZEPOXIDE HYDROCHLORIDE 25 MG/1
CAPSULE, GELATIN COATED ORAL
Qty: 13 CAPSULE | Refills: 0 | Status: SHIPPED | OUTPATIENT
Start: 2024-11-08 | End: 2024-11-12

## 2024-11-08 RX ORDER — GABAPENTIN 600 MG/1
1200 TABLET ORAL ONCE
Status: COMPLETED | OUTPATIENT
Start: 2024-11-08 | End: 2024-11-08

## 2024-11-08 RX ORDER — GABAPENTIN 300 MG/1
900 CAPSULE ORAL EVERY 8 HOURS
Status: DISCONTINUED | OUTPATIENT
Start: 2024-11-08 | End: 2024-11-08 | Stop reason: HOSPADM

## 2024-11-08 RX ORDER — GABAPENTIN 300 MG/1
CAPSULE ORAL
Qty: 9 CAPSULE | Refills: 0 | Status: SHIPPED | OUTPATIENT
Start: 2024-11-08 | End: 2024-11-11

## 2024-11-08 RX ORDER — OLANZAPINE 5 MG/1
5-10 TABLET, ORALLY DISINTEGRATING ORAL EVERY 6 HOURS PRN
Status: DISCONTINUED | OUTPATIENT
Start: 2024-11-08 | End: 2024-11-08 | Stop reason: HOSPADM

## 2024-11-08 RX ORDER — DEXTROSE MONOHYDRATE AND SODIUM CHLORIDE 5; .9 G/100ML; G/100ML
INJECTION, SOLUTION INTRAVENOUS CONTINUOUS
Status: DISCONTINUED | OUTPATIENT
Start: 2024-11-08 | End: 2024-11-08 | Stop reason: HOSPADM

## 2024-11-08 RX ADMIN — FOLIC ACID 1 MG: 1 TABLET ORAL at 12:20

## 2024-11-08 RX ADMIN — SODIUM CHLORIDE, POTASSIUM CHLORIDE, SODIUM LACTATE AND CALCIUM CHLORIDE 1000 ML: 600; 310; 30; 20 INJECTION, SOLUTION INTRAVENOUS at 14:31

## 2024-11-08 RX ADMIN — DIAZEPAM 10 MG: 5 TABLET ORAL at 14:42

## 2024-11-08 RX ADMIN — THIAMINE HCL TAB 100 MG 100 MG: 100 TAB at 12:20

## 2024-11-08 RX ADMIN — Medication 1 TABLET: at 12:20

## 2024-11-08 RX ADMIN — DEXTROSE AND SODIUM CHLORIDE: 5; 900 INJECTION, SOLUTION INTRAVENOUS at 13:13

## 2024-11-08 RX ADMIN — DIAZEPAM 10 MG: 5 INJECTION, SOLUTION INTRAMUSCULAR; INTRAVENOUS at 12:20

## 2024-11-08 RX ADMIN — CLONIDINE HYDROCHLORIDE 0.1 MG: 0.1 TABLET ORAL at 13:13

## 2024-11-08 RX ADMIN — GABAPENTIN 1200 MG: 600 TABLET, FILM COATED ORAL at 12:20

## 2024-11-08 RX ADMIN — DIAZEPAM 10 MG: 5 TABLET ORAL at 16:29

## 2024-11-08 ASSESSMENT — LIFESTYLE VARIABLES
AGITATION: MODERATELY FIDGETY AND RESTLESS
NAUSEA AND VOMITING: MILD NAUSEA WITH NO VOMITING
VISUAL DISTURBANCES: MILD SENSITIVITY
TREMOR: MODERATE, WITH PATIENT'S ARMS EXTENDED
NAUSEA AND VOMITING: 2
NAUSEA AND VOMITING: 5
HEADACHE, FULLNESS IN HEAD: NOT PRESENT
AGITATION: NORMAL ACTIVITY
AGITATION: 3
TREMOR: MODERATE, WITH PATIENT'S ARMS EXTENDED
HEADACHE, FULLNESS IN HEAD: MODERATE
AUDITORY DISTURBANCES: NOT PRESENT
ANXIETY: MODERATELY ANXIOUS, OR GUARDED, SO ANXIETY IS INFERRED
ORIENTATION AND CLOUDING OF SENSORIUM: ORIENTED AND CAN DO SERIAL ADDITIONS
PAROXYSMAL SWEATS: NO SWEAT VISIBLE
TREMOR: 6
TREMOR: 5
AUDITORY DISTURBANCES: NOT PRESENT
NAUSEA AND VOMITING: MILD NAUSEA WITH NO VOMITING
PAROXYSMAL SWEATS: NO SWEAT VISIBLE
TOTAL SCORE: 6
HEADACHE, FULLNESS IN HEAD: MODERATELY SEVERE
TREMOR: NO TREMOR
VISUAL DISTURBANCES: NOT PRESENT
AGITATION: NORMAL ACTIVITY
AUDITORY DISTURBANCES: NOT PRESENT
AGITATION: SOMEWHAT MORE THAN NORMAL ACTIVITY
PAROXYSMAL SWEATS: NO SWEAT VISIBLE
VISUAL DISTURBANCES: NOT PRESENT
TOTAL SCORE: 26
PAROXYSMAL SWEATS: NO SWEAT VISIBLE
VISUAL DISTURBANCES: VERY MILD SENSITIVITY
AUDITORY DISTURBANCES: NOT PRESENT
TOTAL SCORE: 20
ANXIETY: MILDLY ANXIOUS
ORIENTATION AND CLOUDING OF SENSORIUM: ORIENTED AND CAN DO SERIAL ADDITIONS
HEADACHE, FULLNESS IN HEAD: MODERATE
ORIENTATION AND CLOUDING OF SENSORIUM: ORIENTED AND CAN DO SERIAL ADDITIONS
ANXIETY: 6
NAUSEA AND VOMITING: MILD NAUSEA WITH NO VOMITING
ANXIETY: 6
ORIENTATION AND CLOUDING OF SENSORIUM: ORIENTED AND CAN DO SERIAL ADDITIONS
VISUAL DISTURBANCES: NOT PRESENT
ORIENTATION AND CLOUDING OF SENSORIUM: ORIENTED AND CAN DO SERIAL ADDITIONS
PAROXYSMAL SWEATS: NO SWEAT VISIBLE
TOTAL SCORE: 12
AUDITORY DISTURBANCES: MILD HARSHNESS OR ABILITY TO FRIGHTEN
HEADACHE, FULLNESS IN HEAD: NOT PRESENT
TOTAL SCORE: 6
ANXIETY: MILDLY ANXIOUS

## 2024-11-08 ASSESSMENT — COLUMBIA-SUICIDE SEVERITY RATING SCALE - C-SSRS
1. IN THE PAST MONTH, HAVE YOU WISHED YOU WERE DEAD OR WISHED YOU COULD GO TO SLEEP AND NOT WAKE UP?: NO
6. HAVE YOU EVER DONE ANYTHING, STARTED TO DO ANYTHING, OR PREPARED TO DO ANYTHING TO END YOUR LIFE?: NO
2. HAVE YOU ACTUALLY HAD ANY THOUGHTS OF KILLING YOURSELF IN THE PAST MONTH?: NO

## 2024-11-08 ASSESSMENT — ACTIVITIES OF DAILY LIVING (ADL)
ADLS_ACUITY_SCORE: 0

## 2024-11-08 NOTE — ED PROVIDER NOTES
Emergency Department Note      History of Present Illness     Chief Complaint  Chest Pain    HPI  Jacquie Henderson is a 34 year old female with history of alcohol withdrawal syndrome who presents to the ED with her grandma for evaluation of chest pain. She reports symptoms of shortness of breath, palpitations, shakes, vomiting and centralized chest pain. She is currently going through alcohol withdrawal and feels worse compared to 3 months ago. Patient doesn't drink daily but usually drinks within 2-3 days. Patient has drank for the past 4 days and last drink was between [id number]. Her drinking periods happen once every 2 months. Denies cough or cold symptoms. No concern for pregnancy. Patient never needed to go to detox.     So she is last used Antabuse with good effect.  Most recently was last month.  Her plan is to stop drinking alcohol completely.    Independent Historian  None    Review of External Notes  Hospital discharge summary 8/3: Admitted under observation for alcohol withdrawal syndrome.  Recommended outpatient therapy.  Past Medical History   Medical History and Problem List   Alcohol withdrawal syndrome  PTSD  Anxiety  Major depressive disorder  Rheumatoid arthritis  Grave's disease  Fibromyalgia   Migraines   OI    Hypertension   LGSIL     Medications   Esgic   Antabuse  Melatonin     Surgical History  Left wrist surgery  Physical Exam   Patient Vitals for the past 24 hrs:   BP Temp Pulse Resp SpO2 Height Weight   11/08/24 1702 130/82 -- -- -- -- -- --   11/08/24 1625 (!) 141/94 -- 104 17 -- -- --   11/08/24 1600 134/85 -- 115 13 -- -- --   11/08/24 1543 133/82 -- (!) 121 12 96 % -- --   11/08/24 1445 134/88 -- 107 15 96 % -- --   11/08/24 1401 129/88 -- 117 22 96 % -- --   11/08/24 1351 -- -- 108 14 97 % -- --   11/08/24 1341 (!) 146/79 -- 100 17 97 % -- --   11/08/24 1331 -- -- 104 14 96 % -- --   11/08/24 1321 (!) 153/102 -- 118 16 96 % -- --   11/08/24 1312 (!) 137/97 -- -- -- 98 % -- --  "  11/08/24 1311 (!) 137/97 -- 109 23 96 % -- --   11/08/24 0946 (!) 150/125 98.8  F (37.1  C) (!) 126 16 97 % 1.702 m (5' 7\") 64.9 kg (143 lb 1.3 oz)     Physical Exam  General: Awake, alert, in mild acute distress   HEENT: Atraumatic   EOM normal   External ears normal   Trachea midline  Dry mucous membranes  Neck: Supple, normal ROM  CV: tachycardic, regular rhythm   No lower extremity edema  2+ radial and DP pulses  PULM: Breath sounds normal bilaterally  No wheezes or rales  ABD: Soft, non-tender, non-distended  Normal bowel sounds   No rebound or guarding   MSK: No gross deformities  NEURO: Alert, no focal deficits  Fine tremors of bilateral hands with extension, no tremor at rest  Skin: Warm, dry and intact  Psych: anxious appearing      Diagnostics   Lab Results   Labs Ordered and Resulted from Time of ED Arrival to Time of ED Departure   BASIC METABOLIC PANEL - Abnormal       Result Value    Sodium 130 (*)     Potassium 3.6      Chloride 84 (*)     Carbon Dioxide (CO2) 19 (*)     Anion Gap 27 (*)     Urea Nitrogen 11.3      Creatinine 0.63      GFR Estimate >90      Calcium 9.3      Glucose 89     HEPATIC FUNCTION PANEL - Abnormal    Protein Total 8.7 (*)     Albumin 5.1      Bilirubin Total 1.2      Alkaline Phosphatase 79       (*)      (*)     Bilirubin Direct 0.27     KETONE BETA-HYDROXYBUTYRATE QUANTITATIVE, RAPID - Abnormal    Ketone (Beta-Hydroxybutyrate) Quantitative 2.63 (*)    ETHYL ALCOHOL LEVEL - Abnormal    Alcohol ethyl 0.22 (*)    BASIC METABOLIC PANEL - Abnormal    Sodium 135      Potassium 3.8      Chloride 94 (*)     Carbon Dioxide (CO2) 25      Anion Gap 16 (*)     Urea Nitrogen 8.1      Creatinine 0.60      GFR Estimate >90      Calcium 9.0      Glucose 130 (*)    KETONE BETA-HYDROXYBUTYRATE QUANTITATIVE, RAPID - Abnormal    Ketone (Beta-Hydroxybutyrate) Quantitative 0.36 (*)    TSH WITH FREE T4 REFLEX - Normal    TSH 0.67     MAGNESIUM - Normal    Magnesium 1.7   "   PHOSPHORUS - Normal    Phosphorus 3.6     HCG QUALITATIVE PREGNANCY - Normal    hCG Serum Qualitative Negative     LIPASE - Normal    Lipase 28     INR - Normal    INR 0.96     CBC WITH PLATELETS AND DIFFERENTIAL    WBC Count 4.4      RBC Count 5.02      Hemoglobin 15.1      Hematocrit 43.7      MCV 87      MCH 30.1      MCHC 34.6      RDW 12.4      Platelet Count 259      % Neutrophils 74      % Lymphocytes 19      % Monocytes 6      % Eosinophils 0      % Basophils 1      % Immature Granulocytes 0      NRBCs per 100 WBC 0      Absolute Neutrophils 3.2      Absolute Lymphocytes 0.9      Absolute Monocytes 0.3      Absolute Eosinophils 0.0      Absolute Basophils 0.1      Absolute Immature Granulocytes 0.0      Absolute NRBCs 0.0         Imaging  Chest XR,  PA & LAT   Final Result   IMPRESSION: No acute cardiopulmonary disease.      BASIL CERVANTES MD            SYSTEM ID:  [id number]        Report per radiology    EKG   ECG results from 11/08/24   EKG 12 lead     Value    Systolic Blood Pressure     Diastolic Blood Pressure     Ventricular Rate 96    Atrial Rate 96    ND Interval 124    QRS Duration 92        QTc 457    P Axis 56    R AXIS 57    T Axis 32    Interpretation ECG      Sinus rhythm  RSR' or QR pattern in V1 suggests right ventricular conduction delay  Borderline ECG  When compared with ECG of 08-Nov-2024 09:35, (unconfirmed)  No significant change was found  Read at 1054     EKG 12 lead     Value    Systolic Blood Pressure     Diastolic Blood Pressure     Ventricular Rate 118    Atrial Rate 118    ND Interval 120    QRS Duration 88        QTc 473    P Axis 60    R AXIS 66    T Axis 49    Interpretation ECG      Sinus tachycardia  Otherwise normal ECG  No previous ECGs available  Read at 1054       Independent Interpretation  None  ED Course    Medications Administered  Medications   cloNIDine (CATAPRES) tablet 0.1 mg (0.1 mg Oral $Given 11/8/24 1313)   OLANZapine zydis (zyPREXA) ODT tab 5-10 mg  (has no administration in time range)     Or   haloperidol lactate (HALDOL) injection 2.5-5 mg (has no administration in time range)   flumazenil (ROMAZICON) injection 0.2 mg (has no administration in time range)   melatonin tablet 5 mg (has no administration in time range)   gabapentin (NEURONTIN) capsule 900 mg (has no administration in time range)   gabapentin (NEURONTIN) capsule 600 mg (has no administration in time range)   gabapentin (NEURONTIN) capsule 300 mg (has no administration in time range)   gabapentin (NEURONTIN) capsule 100 mg (has no administration in time range)   diazepam (VALIUM) tablet 10 mg (10 mg Oral $Given 11/8/24 1629)     Or   diazepam (VALIUM) injection 5-10 mg ( Intravenous See Alternative 11/8/24 1629)   thiamine (B-1) tablet 100 mg (100 mg Oral $Given 11/8/24 1220)   folic acid (FOLVITE) tablet 1 mg (1 mg Oral $Given 11/8/24 1220)   multivitamin w/minerals (THERA-VIT-M) tablet 1 tablet (1 tablet Oral $Given 11/8/24 1220)   dextrose 5% and 0.9% NaCl infusion (0 mLs Intravenous Stopped 11/8/24 1708)   gabapentin (NEURONTIN) tablet 1,200 mg (1,200 mg Oral $Given 11/8/24 1220)   lactated ringers BOLUS 1,000 mL (0 mLs Intravenous Stopped 11/8/24 1630)     Procedures  Procedures     Discussion of Management  None    Additional Documentation  None    ED Course  ED Course as of 11/08/24 1730   Fri Nov 08, 2024   1044 I obtained history and examined the patient as noted above.    1424 I rechecked the patient and explained findings. Patient is feeling better and is wanting to go home. We discussed of repeating labs.   1659 recheck     Medical Decision Making / Diagnosis   CMS Diagnoses: None    MIPS     None    MDM  34-year-old patient with recent alcohol binge and alcohol use disorder who presents in withdrawal.  No history of withdrawal seizures.  Labs suggest transaminitis though no significant liver dysfunction with normal bilirubin and INR.  Lipase is WNL.    Patient is interested in  restarting Antabuse.  Given current transaminitis I am recommending against this at this time.  Abdominal exam is benign so I have a low suspicion for pancreatitis, gallbladder or biliary pathology.  Suspect combination from Antabuse and EtOH causing transaminitis today.     Is placed on CIWA protocol and feeling significantly improved thereafter.  CIWA scores less than 10 with treatment.  Patient does have a starvation ketoacidosis mixed with alcoholic ketoacidosis.  Received D5 normal saline infusion for several hours as well as a liter of LR with marked improvement in beta hydroxybutyrate and anion gap.     Given symptoms are well-controlled, patient not requiring hospitalization.  Her intention is to stop drinking alcohol and will need benzo taper for home.  She lives with her grandmother who is in support of her coming home with medications to help her get through withdrawal.  Will prescribe gabapentin and Librium tapers.  Should her symptoms not be controlled, she develop infectious signs or symptoms, should return to the ED.  Otherwise I feel she is safe for outpatient taper and close PCP follow-up especially for recheck of LFTs.  Patient voices understanding of plan.  All questions answered.      Disposition  The patient was discharged.     ICD-10 Codes:    ICD-10-CM    1. Alcohol withdrawal, uncomplicated (H)  F10.930       2. Transaminitis  R74.01       3. Starvation ketoacidosis  T73.0XXA     E87.29       4. Alcoholic ketoacidosis  E87.29            Discharge Medications  New Prescriptions    CHLORDIAZEPOXIDE (LIBRIUM) 25 MG CAPSULE    Take 2 capsules (50 mg) by mouth every 8 hours for 1 day, THEN 1 capsule (25 mg) every 6 hours for 1 day, THEN 1 capsule (25 mg) every 12 hours for 1 day, THEN 1 capsule (25 mg) at bedtime for 1 day.    GABAPENTIN (NEURONTIN) 300 MG CAPSULE    Take 1 capsule (300 mg) by mouth every 6 hours for 1 day, THEN 1 capsule (300 mg) every 8 hours for 1 day, THEN 1 capsule (300 mg)  every 12 hours for 1 day.    ONDANSETRON (ZOFRAN ODT) 4 MG ODT TAB    Take 1 tablet (4 mg) by mouth every 8 hours as needed for vomiting or nausea.     Scribe Disclosure:  I, Melissa Jimenes, am serving as a scribe at 10:46 AM on 11/8/2024 to document services personally performed by Destini Montgomery DO based on my observations and the provider's statements to me.      Destini Montgomery DO  11/08/24 1848

## 2024-11-08 NOTE — Clinical Note
Jacquie Henderson was seen and treated in our emergency department on 11/8/2024.  She may return to work on 11/13/2024.  If symptoms get worse or not controlled by the prescribed indications, please come back to the ER.  Is not safe to drink alcohol while you are taking chlordiazepoxide.  Please have your liver function test rechecked with your primary doctor in 1 week.  Do not take Antabuse.  If you have worsening abdominal pain, noticed dark urine, develop fevers, you should also return for evaluation.     If you have any questions or concerns, please don't hesitate to call.      Destini Montgomery, DO

## 2024-11-08 NOTE — ED TRIAGE NOTES
Patient ambulatory reporting chest palpitations for the last 6 months and concern for alcohol withdrawal. Patient reports last drink was midnight last night, patient does not drink daily. Reports 8 drinks yesterday. Patient reports the palpitations come and go and can't catch her breath when feeling them.

## 2025-02-03 ENCOUNTER — HOSPITAL ENCOUNTER (EMERGENCY)
Facility: CLINIC | Age: 35
Discharge: HOME | End: 2025-02-04
Attending: EMERGENCY MEDICINE | Admitting: EMERGENCY MEDICINE
Payer: COMMERCIAL

## 2025-02-03 DIAGNOSIS — F10.230 ALCOHOL DEPENDENCE WITH UNCOMPLICATED WITHDRAWAL (H): ICD-10-CM

## 2025-02-03 LAB
ALBUMIN SERPL BCG-MCNC: 4.8 G/DL (ref 3.5–5.2)
ALP SERPL-CCNC: 76 U/L (ref 40–150)
ALT SERPL W P-5'-P-CCNC: 88 U/L (ref 0–50)
ANION GAP SERPL CALCULATED.3IONS-SCNC: 22 MMOL/L (ref 7–15)
AST SERPL W P-5'-P-CCNC: 164 U/L (ref 0–45)
BASOPHILS # BLD AUTO: 0.1 10E3/UL (ref 0–0.2)
BASOPHILS NFR BLD AUTO: 1 %
BILIRUB SERPL-MCNC: 0.5 MG/DL
BUN SERPL-MCNC: 9.7 MG/DL (ref 6–20)
CALCIUM SERPL-MCNC: 9.1 MG/DL (ref 8.8–10.4)
CHLORIDE SERPL-SCNC: 93 MMOL/L (ref 98–107)
CREAT SERPL-MCNC: 0.69 MG/DL (ref 0.51–0.95)
EGFRCR SERPLBLD CKD-EPI 2021: >90 ML/MIN/1.73M2
EOSINOPHIL # BLD AUTO: 0 10E3/UL (ref 0–0.7)
EOSINOPHIL NFR BLD AUTO: 0 %
ERYTHROCYTE [DISTWIDTH] IN BLOOD BY AUTOMATED COUNT: 12.2 % (ref 10–15)
ETHANOL SERPL-MCNC: 0.35 G/DL
GLUCOSE SERPL-MCNC: 118 MG/DL (ref 70–99)
HCO3 SERPL-SCNC: 24 MMOL/L (ref 22–29)
HCT VFR BLD AUTO: 42.1 % (ref 35–47)
HGB BLD-MCNC: 14.7 G/DL (ref 11.7–15.7)
HOLD SPECIMEN: NORMAL
HOLD SPECIMEN: NORMAL
IMM GRANULOCYTES # BLD: 0 10E3/UL
IMM GRANULOCYTES NFR BLD: 0 %
LYMPHOCYTES # BLD AUTO: 0.8 10E3/UL (ref 0.8–5.3)
LYMPHOCYTES NFR BLD AUTO: 17 %
MAGNESIUM SERPL-MCNC: 1.8 MG/DL (ref 1.7–2.3)
MCH RBC QN AUTO: 30.5 PG (ref 26.5–33)
MCHC RBC AUTO-ENTMCNC: 34.9 G/DL (ref 31.5–36.5)
MCV RBC AUTO: 87 FL (ref 78–100)
MONOCYTES # BLD AUTO: 0.6 10E3/UL (ref 0–1.3)
MONOCYTES NFR BLD AUTO: 12 %
NEUTROPHILS # BLD AUTO: 3.3 10E3/UL (ref 1.6–8.3)
NEUTROPHILS NFR BLD AUTO: 69 %
NRBC # BLD AUTO: 0 10E3/UL
NRBC BLD AUTO-RTO: 0 /100
PLATELET # BLD AUTO: 231 10E3/UL (ref 150–450)
POTASSIUM SERPL-SCNC: 4 MMOL/L (ref 3.4–5.3)
PROT SERPL-MCNC: 8.1 G/DL (ref 6.4–8.3)
RBC # BLD AUTO: 4.82 10E6/UL (ref 3.8–5.2)
SODIUM SERPL-SCNC: 139 MMOL/L (ref 135–145)
WBC # BLD AUTO: 4.7 10E3/UL (ref 4–11)

## 2025-02-03 PROCEDURE — 258N000003 HC RX IP 258 OP 636: Performed by: EMERGENCY MEDICINE

## 2025-02-03 PROCEDURE — 84075 ASSAY ALKALINE PHOSPHATASE: CPT | Performed by: EMERGENCY MEDICINE

## 2025-02-03 PROCEDURE — 250N000011 HC RX IP 250 OP 636: Performed by: EMERGENCY MEDICINE

## 2025-02-03 PROCEDURE — 36415 COLL VENOUS BLD VENIPUNCTURE: CPT | Performed by: EMERGENCY MEDICINE

## 2025-02-03 PROCEDURE — 85025 COMPLETE CBC W/AUTO DIFF WBC: CPT | Performed by: EMERGENCY MEDICINE

## 2025-02-03 PROCEDURE — 99284 EMERGENCY DEPT VISIT MOD MDM: CPT | Mod: 25

## 2025-02-03 PROCEDURE — 84155 ASSAY OF PROTEIN SERUM: CPT | Performed by: EMERGENCY MEDICINE

## 2025-02-03 PROCEDURE — 82077 ASSAY SPEC XCP UR&BREATH IA: CPT | Performed by: EMERGENCY MEDICINE

## 2025-02-03 PROCEDURE — 96375 TX/PRO/DX INJ NEW DRUG ADDON: CPT

## 2025-02-03 PROCEDURE — 96361 HYDRATE IV INFUSION ADD-ON: CPT

## 2025-02-03 PROCEDURE — 83735 ASSAY OF MAGNESIUM: CPT | Performed by: EMERGENCY MEDICINE

## 2025-02-03 PROCEDURE — 250N000013 HC RX MED GY IP 250 OP 250 PS 637: Performed by: EMERGENCY MEDICINE

## 2025-02-03 PROCEDURE — 96374 THER/PROPH/DIAG INJ IV PUSH: CPT

## 2025-02-03 PROCEDURE — 82310 ASSAY OF CALCIUM: CPT | Performed by: EMERGENCY MEDICINE

## 2025-02-03 RX ORDER — MULTIPLE VITAMINS W/ MINERALS TAB 9MG-400MCG
1 TAB ORAL ONCE
Status: COMPLETED | OUTPATIENT
Start: 2025-02-03 | End: 2025-02-03

## 2025-02-03 RX ORDER — FOLIC ACID 5 MG/ML
1 INJECTION, SOLUTION INTRAMUSCULAR; INTRAVENOUS; SUBCUTANEOUS ONCE
Status: COMPLETED | OUTPATIENT
Start: 2025-02-03 | End: 2025-02-03

## 2025-02-03 RX ORDER — THIAMINE HYDROCHLORIDE 100 MG/ML
100 INJECTION, SOLUTION INTRAMUSCULAR; INTRAVENOUS ONCE
Status: COMPLETED | OUTPATIENT
Start: 2025-02-03 | End: 2025-02-03

## 2025-02-03 RX ORDER — ONDANSETRON 4 MG/1
4 TABLET, ORALLY DISINTEGRATING ORAL EVERY 8 HOURS PRN
Qty: 10 TABLET | Refills: 0 | Status: SHIPPED | OUTPATIENT
Start: 2025-02-03 | End: 2025-02-06

## 2025-02-03 RX ORDER — DIAZEPAM 5 MG/1
5 TABLET ORAL ONCE
Status: COMPLETED | OUTPATIENT
Start: 2025-02-03 | End: 2025-02-03

## 2025-02-03 RX ORDER — GABAPENTIN 300 MG/1
CAPSULE ORAL
Qty: 9 CAPSULE | Refills: 0 | Status: SHIPPED | OUTPATIENT
Start: 2025-02-03 | End: 2025-02-06

## 2025-02-03 RX ADMIN — DIAZEPAM 5 MG: 5 TABLET ORAL at 20:08

## 2025-02-03 RX ADMIN — THIAMINE HYDROCHLORIDE 100 MG: 100 INJECTION, SOLUTION INTRAMUSCULAR; INTRAVENOUS at 20:09

## 2025-02-03 RX ADMIN — FOLIC ACID 1 MG: 5 INJECTION, SOLUTION INTRAMUSCULAR; INTRAVENOUS; SUBCUTANEOUS at 19:59

## 2025-02-03 RX ADMIN — SODIUM CHLORIDE 1000 ML: 900 INJECTION, SOLUTION INTRAVENOUS at 20:09

## 2025-02-03 RX ADMIN — DIAZEPAM 5 MG: 5 TABLET ORAL at 21:24

## 2025-02-03 RX ADMIN — Medication 1 TABLET: at 20:09

## 2025-02-03 ASSESSMENT — ACTIVITIES OF DAILY LIVING (ADL)
ADLS_ACUITY_SCORE: 47

## 2025-02-03 ASSESSMENT — COLUMBIA-SUICIDE SEVERITY RATING SCALE - C-SSRS
2. HAVE YOU ACTUALLY HAD ANY THOUGHTS OF KILLING YOURSELF IN THE PAST MONTH?: NO
6. HAVE YOU EVER DONE ANYTHING, STARTED TO DO ANYTHING, OR PREPARED TO DO ANYTHING TO END YOUR LIFE?: NO
1. IN THE PAST MONTH, HAVE YOU WISHED YOU WERE DEAD OR WISHED YOU COULD GO TO SLEEP AND NOT WAKE UP?: NO

## 2025-02-03 NOTE — Clinical Note
Jacquie Henderson was seen and treated in our emergency department on 2/3/2025.  She may return to work on 02/06/2025.       If you have any questions or concerns, please don't hesitate to call.      Karely Gray MD

## 2025-02-04 VITALS
TEMPERATURE: 98.2 F | HEART RATE: 116 BPM | BODY MASS INDEX: 20.69 KG/M2 | RESPIRATION RATE: 12 BRPM | SYSTOLIC BLOOD PRESSURE: 124 MMHG | WEIGHT: 131.84 LBS | OXYGEN SATURATION: 97 % | HEIGHT: 67 IN | DIASTOLIC BLOOD PRESSURE: 80 MMHG

## 2025-02-04 LAB
ATRIAL RATE - MUSE: 116 BPM
DIASTOLIC BLOOD PRESSURE - MUSE: NORMAL MMHG
INTERPRETATION ECG - MUSE: NORMAL
P AXIS - MUSE: 43 DEGREES
PR INTERVAL - MUSE: 156 MS
QRS DURATION - MUSE: 84 MS
QT - MUSE: 346 MS
QTC - MUSE: 480 MS
R AXIS - MUSE: 55 DEGREES
SYSTOLIC BLOOD PRESSURE - MUSE: NORMAL MMHG
T AXIS - MUSE: 27 DEGREES
VENTRICULAR RATE- MUSE: 116 BPM

## 2025-02-04 NOTE — DISCHARGE INSTRUCTIONS
You can use gabapentin for withdrawal symptoms.  You can use zofran for nausea and vomiting.    Please return to the Emergency Department with any worsening symptoms.

## 2025-02-04 NOTE — ED PROVIDER NOTES
"  Emergency Department Note      History of Present Illness     Chief Complaint   Drug / Alcohol Assessment      HPI   Jacquie Henderson is a 34 year old female who presents for concern of alcohol withdrawal. Patients reports her last drink was at approximately 1200 today and has consumed about 8 drinks of hard liquor every day for the past 3 days. She reports one episode of emesis this morning. Patient notes history of alcohol withdrawal and states her current symptoms feel similar to this. She reports in the past she was discharged with Zofran. Denies diarrhea or history of withdrawal seizures. She says she would like to go home today.     Independent Historian   None    Review of External Notes   Reviewed patient's ED visit from 11/8/2024, patient was seen for alcohol withdrawal.    Past Medical History     Medical History and Problem List   Alcohol withdrawal  Migraines  PTSD  Anxiety  Depression  Graves' disease  Fibromyalgia  Rheumatoid arthritis    Medications   Neurontin  Antabuse  Esgic    Physical Exam     Patient Vitals for the past 24 hrs:   BP Temp Temp src Pulse Resp SpO2 Height Weight   02/03/25 2245 126/80 -- -- 117 12 97 % -- --   02/03/25 2230 126/90 -- -- (!) 137 12 99 % -- --   02/03/25 2215 128/87 -- -- 117 15 97 % -- --   02/03/25 2155 132/90 -- -- 116 23 96 % -- --   02/03/25 2150 -- -- -- 116 17 96 % -- --   02/03/25 2145 -- -- -- (!) 130 (!) 8 95 % -- --   02/03/25 2140 (!) 134/94 -- -- 112 16 96 % -- --   02/03/25 2135 -- -- -- (!) 124 10 93 % -- --   02/03/25 2130 (!) 128/100 -- -- 116 11 97 % -- --   02/03/25 2030 -- -- -- 114 17 96 % -- --   02/03/25 1936 (!) 139/102 -- -- 107 -- 96 % -- --   02/03/25 1837 (!) 148/115 -- -- -- -- -- -- --   02/03/25 1836 -- 98.2  F (36.8  C) Temporal (!) 137 17 97 % 1.702 m (5' 7\") 59.8 kg (131 lb 13.4 oz)     Physical Exam  General: Well-nourished, tremulous.    Eyes: Pupils equal, conjunctivae pink no scleral icterus or conjunctival injection  ENT:  " Moist mucus membranes  Respiratory:  Lungs clear to auscultation bilaterally, no crackles/rubs/wheezes.  Good air movement  CV: Normal rhythm, no murmurs.  Tachycardia.  GI:  Abdomen soft and non-distended.  No tenderness, guarding or rebound  Skin: Warm, dry.  No rashes or petechiae  Musculoskeletal: No peripheral edema or calf tenderness  Neuro: Alert and oriented to person/place/time      Diagnostics     Lab Results   Labs Ordered and Resulted from Time of ED Arrival to Time of ED Departure   ETHYL ALCOHOL LEVEL - Abnormal       Result Value    Alcohol ethyl 0.35 (*)    COMPREHENSIVE METABOLIC PANEL - Abnormal    Sodium 139      Potassium 4.0      Carbon Dioxide (CO2) 24      Anion Gap 22 (*)     Urea Nitrogen 9.7      Creatinine 0.69      GFR Estimate >90      Calcium 9.1      Chloride 93 (*)     Glucose 118 (*)     Alkaline Phosphatase 76       (*)     ALT 88 (*)     Protein Total 8.1      Albumin 4.8      Bilirubin Total 0.5     MAGNESIUM - Normal    Magnesium 1.8     CBC WITH PLATELETS AND DIFFERENTIAL    WBC Count 4.7      RBC Count 4.82      Hemoglobin 14.7      Hematocrit 42.1      MCV 87      MCH 30.5      MCHC 34.9      RDW 12.2      Platelet Count 231      % Neutrophils 69      % Lymphocytes 17      % Monocytes 12      % Eosinophils 0      % Basophils 1      % Immature Granulocytes 0      NRBCs per 100 WBC 0      Absolute Neutrophils 3.3      Absolute Lymphocytes 0.8      Absolute Monocytes 0.6      Absolute Eosinophils 0.0      Absolute Basophils 0.1      Absolute Immature Granulocytes 0.0      Absolute NRBCs 0.0       EKG   ECG taken at 1854, ECG read at 1904  Sinus tachycardia. Possible left atrial enlargement. QTcB >= 480 msec. Abnormal ECG.  Rate 116 bpm. MN interval 156 ms. QRS duration 84 ms. QT/QTc 346/480 ms. P-R-T axes 43 55 27.    Independent Interpretation   None    ED Course      Medications Administered   Medications   sodium chloride 0.9% BOLUS 1,000 mL (1,000 mLs Intravenous $New  Bag 2/3/25 2009)   diazepam (VALIUM) tablet 5 mg (5 mg Oral $Given 2/3/25 2008)   multivitamin w/minerals (THERA-VIT-M) tablet 1 tablet (1 tablet Oral $Given 2/3/25 2009)   thiamine (B-1) injection 100 mg (100 mg Intravenous $Given 2/3/25 2009)   folic acid injection 1 mg (1 mg Intravenous $Given 2/3/25 1959)   diazepam (VALIUM) tablet 5 mg (5 mg Oral $Given 2/3/25 2124)     Procedures   Procedures     Discussion of Management   None    ED Course   ED Course as of 02/03/25 2331   Mon Feb 03, 2025 1939 I obtained history and examined the patient as noted above.     2226 I rechecked and updated the patient.       Additional Documentation  None    Medical Decision Making / Diagnosis     CMS Diagnoses: None    MIPS       None    MDM   Jacquie Henderson is a 34 year old female with a history of alcohol use disorder presents to the emergency department with a complaint of nausea, vomiting, tremors.  On exam patient is tremulous and tachycardic.  Abdomen is soft and nontender.  Workup reveals her alcohol level is 0.35.  He has a slight anion gap of 22 and is given fluids.  Liver enzymes are at her baseline.  No signs of MIAN.  Other electrolytes are all within acceptable limits.  Patient is given vitamins.  She is also given Valium.  On reevaluation, the patient is feeling much better.  She is no longer tremulous.  She is keeping down fluids.  She refuses to go to detox.  I will give her prescription for gabapentin as well as Zofran.  She states that her goal today is to go home.  She has not had any withdrawal seizures, so I think gabapentin will be appropriate for her.  I would be hesitant to send her home with benzodiazepines.  I do not think that she needs admission to the hospital.  Patient feels comfortable going home.  She is discharged.    Disposition   The patient was discharged.     Diagnosis     ICD-10-CM    1. Alcohol dependence with uncomplicated withdrawal (H)  F10.230            Discharge Medications   New  Prescriptions    ONDANSETRON (ZOFRAN ODT) 4 MG ODT TAB    Take 1 tablet (4 mg) by mouth every 8 hours as needed for nausea.       Scribe Disclosure:  I, Julianne Flynn, am serving as a scribe at 7:56 PM on 2/3/2025 to document services personally performed by Karely Gray MD based on my observations and the provider's statements to me.        Karely Gray MD  02/04/25 1823

## 2025-02-04 NOTE — ED TRIAGE NOTES
Pt concerned for alcohol withdrawal. Pt states last drink was at noon today. Pt states she has drank the last three days, each day drinking about 7 drinks of liquor and wine. Pt has hx of alcohol withdrawals. No hx of seizure withdrawals. Pt complains of diaphoresis, tremors vomiting.

## 2025-02-22 ENCOUNTER — APPOINTMENT (OUTPATIENT)
Dept: GENERAL RADIOLOGY | Facility: CLINIC | Age: 35
DRG: 897 | End: 2025-02-22
Attending: EMERGENCY MEDICINE
Payer: COMMERCIAL

## 2025-02-22 ENCOUNTER — HOSPITAL ENCOUNTER (INPATIENT)
Facility: CLINIC | Age: 35
LOS: 5 days | Discharge: HOME | End: 2025-02-27
Attending: EMERGENCY MEDICINE | Admitting: HOSPITALIST
Payer: COMMERCIAL

## 2025-02-22 DIAGNOSIS — R11.10 VOMITING, UNSPECIFIED VOMITING TYPE, UNSPECIFIED WHETHER NAUSEA PRESENT: ICD-10-CM

## 2025-02-22 DIAGNOSIS — F10.920 ALCOHOLIC INTOXICATION WITHOUT COMPLICATION: ICD-10-CM

## 2025-02-22 DIAGNOSIS — F10.10 ALCOHOL ABUSE: Primary | ICD-10-CM

## 2025-02-22 DIAGNOSIS — R04.0 EPISTAXIS: ICD-10-CM

## 2025-02-22 DIAGNOSIS — E88.89 KETOSIS (H): ICD-10-CM

## 2025-02-22 LAB
ALBUMIN SERPL BCG-MCNC: 4.8 G/DL (ref 3.5–5.2)
ALP SERPL-CCNC: 64 U/L (ref 40–150)
ALT SERPL W P-5'-P-CCNC: 39 U/L (ref 0–50)
ANION GAP SERPL CALCULATED.3IONS-SCNC: 28 MMOL/L (ref 7–15)
AST SERPL W P-5'-P-CCNC: 55 U/L (ref 0–45)
B-OH-BUTYR SERPL-SCNC: 1.62 MMOL/L
BASOPHILS # BLD AUTO: 0.1 10E3/UL (ref 0–0.2)
BASOPHILS NFR BLD AUTO: 2 %
BILIRUB SERPL-MCNC: 0.6 MG/DL
BUN SERPL-MCNC: 10.1 MG/DL (ref 6–20)
CALCIUM SERPL-MCNC: 9 MG/DL (ref 8.8–10.4)
CHLORIDE SERPL-SCNC: 93 MMOL/L (ref 98–107)
CREAT SERPL-MCNC: 0.63 MG/DL (ref 0.51–0.95)
EGFRCR SERPLBLD CKD-EPI 2021: >90 ML/MIN/1.73M2
EOSINOPHIL # BLD AUTO: 0 10E3/UL (ref 0–0.7)
EOSINOPHIL NFR BLD AUTO: 0 %
ERYTHROCYTE [DISTWIDTH] IN BLOOD BY AUTOMATED COUNT: 12.3 % (ref 10–15)
ETHANOL SERPL-MCNC: 0.32 G/DL
GLUCOSE SERPL-MCNC: 80 MG/DL (ref 70–99)
HCG SERPL QL: NEGATIVE
HCO3 SERPL-SCNC: 19 MMOL/L (ref 22–29)
HCT VFR BLD AUTO: 41.9 % (ref 35–47)
HGB BLD-MCNC: 14.4 G/DL (ref 11.7–15.7)
HOLD SPECIMEN: NORMAL
IMM GRANULOCYTES # BLD: 0 10E3/UL
IMM GRANULOCYTES NFR BLD: 0 %
LACTATE SERPL-SCNC: 4.2 MMOL/L (ref 0.7–2)
LIPASE SERPL-CCNC: 26 U/L (ref 13–60)
LYMPHOCYTES # BLD AUTO: 1.4 10E3/UL (ref 0.8–5.3)
LYMPHOCYTES NFR BLD AUTO: 32 %
MAGNESIUM SERPL-MCNC: 1.7 MG/DL (ref 1.7–2.3)
MAGNESIUM SERPL-MCNC: 1.8 MG/DL (ref 1.7–2.3)
MCH RBC QN AUTO: 30.4 PG (ref 26.5–33)
MCHC RBC AUTO-ENTMCNC: 34.4 G/DL (ref 31.5–36.5)
MCV RBC AUTO: 88 FL (ref 78–100)
MONOCYTES # BLD AUTO: 0.2 10E3/UL (ref 0–1.3)
MONOCYTES NFR BLD AUTO: 5 %
NEUTROPHILS # BLD AUTO: 2.7 10E3/UL (ref 1.6–8.3)
NEUTROPHILS NFR BLD AUTO: 61 %
NRBC # BLD AUTO: 0 10E3/UL
NRBC BLD AUTO-RTO: 0 /100
PHOSPHATE SERPL-MCNC: 3.6 MG/DL (ref 2.5–4.5)
PLATELET # BLD AUTO: 334 10E3/UL (ref 150–450)
POTASSIUM SERPL-SCNC: 4 MMOL/L (ref 3.4–5.3)
PROT SERPL-MCNC: 8.3 G/DL (ref 6.4–8.3)
RBC # BLD AUTO: 4.74 10E6/UL (ref 3.8–5.2)
SODIUM SERPL-SCNC: 140 MMOL/L (ref 135–145)
TROPONIN T SERPL HS-MCNC: <6 NG/L
WBC # BLD AUTO: 4.4 10E3/UL (ref 4–11)

## 2025-02-22 PROCEDURE — 120N000001 HC R&B MED SURG/OB

## 2025-02-22 PROCEDURE — 83690 ASSAY OF LIPASE: CPT | Performed by: EMERGENCY MEDICINE

## 2025-02-22 PROCEDURE — 84703 CHORIONIC GONADOTROPIN ASSAY: CPT | Performed by: EMERGENCY MEDICINE

## 2025-02-22 PROCEDURE — 36415 COLL VENOUS BLD VENIPUNCTURE: CPT | Performed by: EMERGENCY MEDICINE

## 2025-02-22 PROCEDURE — 250N000011 HC RX IP 250 OP 636: Performed by: HOSPITALIST

## 2025-02-22 PROCEDURE — 71046 X-RAY EXAM CHEST 2 VIEWS: CPT

## 2025-02-22 PROCEDURE — HZ2ZZZZ DETOXIFICATION SERVICES FOR SUBSTANCE ABUSE TREATMENT: ICD-10-PCS | Performed by: HOSPITALIST

## 2025-02-22 PROCEDURE — 250N000011 HC RX IP 250 OP 636: Performed by: EMERGENCY MEDICINE

## 2025-02-22 PROCEDURE — 250N000013 HC RX MED GY IP 250 OP 250 PS 637: Performed by: HOSPITALIST

## 2025-02-22 PROCEDURE — 83605 ASSAY OF LACTIC ACID: CPT | Performed by: HOSPITALIST

## 2025-02-22 PROCEDURE — 83735 ASSAY OF MAGNESIUM: CPT | Performed by: HOSPITALIST

## 2025-02-22 PROCEDURE — 99223 1ST HOSP IP/OBS HIGH 75: CPT | Performed by: HOSPITALIST

## 2025-02-22 PROCEDURE — 84484 ASSAY OF TROPONIN QUANT: CPT | Performed by: EMERGENCY MEDICINE

## 2025-02-22 PROCEDURE — 96375 TX/PRO/DX INJ NEW DRUG ADDON: CPT

## 2025-02-22 PROCEDURE — 96374 THER/PROPH/DIAG INJ IV PUSH: CPT

## 2025-02-22 PROCEDURE — 99285 EMERGENCY DEPT VISIT HI MDM: CPT | Mod: 25

## 2025-02-22 PROCEDURE — 82077 ASSAY SPEC XCP UR&BREATH IA: CPT | Performed by: EMERGENCY MEDICINE

## 2025-02-22 PROCEDURE — 96361 HYDRATE IV INFUSION ADD-ON: CPT

## 2025-02-22 PROCEDURE — 84132 ASSAY OF SERUM POTASSIUM: CPT | Performed by: EMERGENCY MEDICINE

## 2025-02-22 PROCEDURE — 258N000003 HC RX IP 258 OP 636: Performed by: EMERGENCY MEDICINE

## 2025-02-22 PROCEDURE — 82010 KETONE BODYS QUAN: CPT | Performed by: EMERGENCY MEDICINE

## 2025-02-22 PROCEDURE — 250N000013 HC RX MED GY IP 250 OP 250 PS 637: Performed by: EMERGENCY MEDICINE

## 2025-02-22 PROCEDURE — 83735 ASSAY OF MAGNESIUM: CPT | Performed by: EMERGENCY MEDICINE

## 2025-02-22 PROCEDURE — 84100 ASSAY OF PHOSPHORUS: CPT | Performed by: HOSPITALIST

## 2025-02-22 PROCEDURE — 36415 COLL VENOUS BLD VENIPUNCTURE: CPT | Performed by: HOSPITALIST

## 2025-02-22 PROCEDURE — 93005 ELECTROCARDIOGRAM TRACING: CPT

## 2025-02-22 PROCEDURE — 250N000009 HC RX 250: Performed by: EMERGENCY MEDICINE

## 2025-02-22 PROCEDURE — 82947 ASSAY GLUCOSE BLOOD QUANT: CPT | Performed by: EMERGENCY MEDICINE

## 2025-02-22 PROCEDURE — 85025 COMPLETE CBC W/AUTO DIFF WBC: CPT | Performed by: EMERGENCY MEDICINE

## 2025-02-22 PROCEDURE — 250N000013 HC RX MED GY IP 250 OP 250 PS 637: Performed by: INTERNAL MEDICINE

## 2025-02-22 RX ORDER — MAGNESIUM SULFATE HEPTAHYDRATE 40 MG/ML
2 INJECTION, SOLUTION INTRAVENOUS ONCE
Status: COMPLETED | OUTPATIENT
Start: 2025-02-22 | End: 2025-02-22

## 2025-02-22 RX ORDER — DIAZEPAM 10 MG/2ML
5-10 INJECTION, SOLUTION INTRAMUSCULAR; INTRAVENOUS EVERY 30 MIN PRN
Status: DISCONTINUED | OUTPATIENT
Start: 2025-02-22 | End: 2025-02-22

## 2025-02-22 RX ORDER — ONDANSETRON 4 MG/1
4 TABLET, ORALLY DISINTEGRATING ORAL EVERY 6 HOURS PRN
Status: DISCONTINUED | OUTPATIENT
Start: 2025-02-22 | End: 2025-02-27 | Stop reason: HOSPADM

## 2025-02-22 RX ORDER — FLUMAZENIL 0.1 MG/ML
0.2 INJECTION, SOLUTION INTRAVENOUS
Status: DISCONTINUED | OUTPATIENT
Start: 2025-02-22 | End: 2025-02-27 | Stop reason: HOSPADM

## 2025-02-22 RX ORDER — MULTIPLE VITAMINS W/ MINERALS TAB 9MG-400MCG
1 TAB ORAL DAILY
Status: DISCONTINUED | OUTPATIENT
Start: 2025-02-22 | End: 2025-02-27 | Stop reason: HOSPADM

## 2025-02-22 RX ORDER — FOLIC ACID 1 MG/1
1 TABLET ORAL DAILY
Status: DISCONTINUED | OUTPATIENT
Start: 2025-02-22 | End: 2025-02-27 | Stop reason: HOSPADM

## 2025-02-22 RX ORDER — THIAMINE HYDROCHLORIDE 100 MG/ML
100 INJECTION, SOLUTION INTRAMUSCULAR; INTRAVENOUS DAILY
Status: COMPLETED | OUTPATIENT
Start: 2025-02-23 | End: 2025-02-27

## 2025-02-22 RX ORDER — CALCIUM CARBONATE 500 MG/1
1000 TABLET, CHEWABLE ORAL 4 TIMES DAILY PRN
Status: DISCONTINUED | OUTPATIENT
Start: 2025-02-22 | End: 2025-02-27 | Stop reason: HOSPADM

## 2025-02-22 RX ORDER — AMOXICILLIN 250 MG
1 CAPSULE ORAL 2 TIMES DAILY PRN
Status: DISCONTINUED | OUTPATIENT
Start: 2025-02-22 | End: 2025-02-27 | Stop reason: HOSPADM

## 2025-02-22 RX ORDER — DIAZEPAM 10 MG/2ML
10 INJECTION, SOLUTION INTRAMUSCULAR; INTRAVENOUS ONCE
Status: COMPLETED | OUTPATIENT
Start: 2025-02-22 | End: 2025-02-22

## 2025-02-22 RX ORDER — GABAPENTIN 100 MG/1
100 CAPSULE ORAL EVERY 8 HOURS
Status: DISCONTINUED | OUTPATIENT
Start: 2025-03-02 | End: 2025-02-27 | Stop reason: HOSPADM

## 2025-02-22 RX ORDER — DIAZEPAM 5 MG/1
10 TABLET ORAL EVERY 30 MIN PRN
Status: DISCONTINUED | OUTPATIENT
Start: 2025-02-22 | End: 2025-02-22

## 2025-02-22 RX ORDER — GABAPENTIN 300 MG/1
600 CAPSULE ORAL EVERY 8 HOURS
Status: DISCONTINUED | OUTPATIENT
Start: 2025-02-26 | End: 2025-02-27 | Stop reason: HOSPADM

## 2025-02-22 RX ORDER — ONDANSETRON 4 MG/1
4 TABLET, FILM COATED ORAL EVERY 8 HOURS PRN
COMMUNITY

## 2025-02-22 RX ORDER — CLONIDINE HYDROCHLORIDE 0.1 MG/1
0.1 TABLET ORAL EVERY 8 HOURS
Status: DISCONTINUED | OUTPATIENT
Start: 2025-02-22 | End: 2025-02-24

## 2025-02-22 RX ORDER — GABAPENTIN 300 MG/1
300 CAPSULE ORAL EVERY 8 HOURS
Status: DISCONTINUED | OUTPATIENT
Start: 2025-02-28 | End: 2025-02-27 | Stop reason: HOSPADM

## 2025-02-22 RX ORDER — MAGNESIUM HYDROXIDE/ALUMINUM HYDROXICE/SIMETHICONE 120; 1200; 1200 MG/30ML; MG/30ML; MG/30ML
15 SUSPENSION ORAL ONCE
Status: COMPLETED | OUTPATIENT
Start: 2025-02-22 | End: 2025-02-22

## 2025-02-22 RX ORDER — AMOXICILLIN 250 MG
2 CAPSULE ORAL 2 TIMES DAILY PRN
Status: DISCONTINUED | OUTPATIENT
Start: 2025-02-22 | End: 2025-02-27 | Stop reason: HOSPADM

## 2025-02-22 RX ORDER — HALOPERIDOL 5 MG/ML
2.5-5 INJECTION INTRAMUSCULAR EVERY 6 HOURS PRN
Status: DISCONTINUED | OUTPATIENT
Start: 2025-02-22 | End: 2025-02-26

## 2025-02-22 RX ORDER — DEXTROSE, SODIUM CHLORIDE, SODIUM LACTATE, POTASSIUM CHLORIDE, AND CALCIUM CHLORIDE 5; .6; .31; .03; .02 G/100ML; G/100ML; G/100ML; G/100ML; G/100ML
INJECTION, SOLUTION INTRAVENOUS CONTINUOUS
Status: DISCONTINUED | OUTPATIENT
Start: 2025-02-22 | End: 2025-02-23

## 2025-02-22 RX ORDER — DIAZEPAM 10 MG/2ML
5-10 INJECTION, SOLUTION INTRAMUSCULAR; INTRAVENOUS EVERY 30 MIN PRN
Status: DISCONTINUED | OUTPATIENT
Start: 2025-02-22 | End: 2025-02-26

## 2025-02-22 RX ORDER — GABAPENTIN 300 MG/1
900 CAPSULE ORAL EVERY 8 HOURS
Status: COMPLETED | OUTPATIENT
Start: 2025-02-23 | End: 2025-02-25

## 2025-02-22 RX ORDER — FOLIC ACID 5 MG/ML
1 INJECTION, SOLUTION INTRAMUSCULAR; INTRAVENOUS; SUBCUTANEOUS DAILY
Status: DISCONTINUED | OUTPATIENT
Start: 2025-02-23 | End: 2025-02-27 | Stop reason: HOSPADM

## 2025-02-22 RX ORDER — ACETAMINOPHEN 325 MG/1
650 TABLET ORAL EVERY 6 HOURS PRN
Status: DISCONTINUED | OUTPATIENT
Start: 2025-02-22 | End: 2025-02-27 | Stop reason: HOSPADM

## 2025-02-22 RX ORDER — FLUMAZENIL 0.1 MG/ML
0.2 INJECTION, SOLUTION INTRAVENOUS
Status: DISCONTINUED | OUTPATIENT
Start: 2025-02-22 | End: 2025-02-22

## 2025-02-22 RX ORDER — DIAZEPAM 5 MG/1
10 TABLET ORAL ONCE
Status: DISCONTINUED | OUTPATIENT
Start: 2025-02-22 | End: 2025-02-22

## 2025-02-22 RX ORDER — OLANZAPINE 5 MG/1
5-10 TABLET, ORALLY DISINTEGRATING ORAL EVERY 6 HOURS PRN
Status: DISCONTINUED | OUTPATIENT
Start: 2025-02-22 | End: 2025-02-26

## 2025-02-22 RX ORDER — LIDOCAINE 40 MG/G
CREAM TOPICAL
Status: DISCONTINUED | OUTPATIENT
Start: 2025-02-22 | End: 2025-02-27 | Stop reason: HOSPADM

## 2025-02-22 RX ORDER — DIAZEPAM 5 MG/1
10 TABLET ORAL EVERY 30 MIN PRN
Status: DISCONTINUED | OUTPATIENT
Start: 2025-02-22 | End: 2025-02-26

## 2025-02-22 RX ORDER — GABAPENTIN 600 MG/1
1200 TABLET ORAL ONCE
Status: COMPLETED | OUTPATIENT
Start: 2025-02-22 | End: 2025-02-22

## 2025-02-22 RX ORDER — ONDANSETRON 2 MG/ML
4 INJECTION INTRAMUSCULAR; INTRAVENOUS EVERY 6 HOURS PRN
Status: DISCONTINUED | OUTPATIENT
Start: 2025-02-22 | End: 2025-02-27 | Stop reason: HOSPADM

## 2025-02-22 RX ADMIN — ACETAMINOPHEN 650 MG: 325 TABLET, FILM COATED ORAL at 23:10

## 2025-02-22 RX ADMIN — THIAMINE HCL TAB 100 MG 100 MG: 100 TAB at 14:37

## 2025-02-22 RX ADMIN — DIAZEPAM 10 MG: 5 INJECTION INTRAMUSCULAR; INTRAVENOUS at 15:00

## 2025-02-22 RX ADMIN — ALUMINUM HYDROXIDE, MAGNESIUM HYDROXIDE, AND DIMETHICONE 15 ML: 200; 20; 200 SUSPENSION ORAL at 14:37

## 2025-02-22 RX ADMIN — SODIUM CHLORIDE 1000 ML: 9 INJECTION, SOLUTION INTRAVENOUS at 14:31

## 2025-02-22 RX ADMIN — GABAPENTIN 1200 MG: 600 TABLET, FILM COATED ORAL at 17:37

## 2025-02-22 RX ADMIN — DIAZEPAM 10 MG: 10 TABLET ORAL at 19:56

## 2025-02-22 RX ADMIN — Medication 1 TABLET: at 17:37

## 2025-02-22 RX ADMIN — SODIUM CHLORIDE, SODIUM LACTATE, POTASSIUM CHLORIDE, CALCIUM CHLORIDE AND DEXTROSE MONOHYDRATE: 5; 600; 310; 30; 20 INJECTION, SOLUTION INTRAVENOUS at 17:28

## 2025-02-22 RX ADMIN — SODIUM CHLORIDE 1000 ML: 9 INJECTION, SOLUTION INTRAVENOUS at 14:41

## 2025-02-22 RX ADMIN — FAMOTIDINE 20 MG: 10 INJECTION, SOLUTION INTRAVENOUS at 17:36

## 2025-02-22 RX ADMIN — MAGNESIUM SULFATE HEPTAHYDRATE 2 G: 40 INJECTION, SOLUTION INTRAVENOUS at 18:11

## 2025-02-22 RX ADMIN — DIAZEPAM 10 MG: 10 TABLET ORAL at 23:10

## 2025-02-22 RX ADMIN — FOLIC ACID 1 MG: 1 TABLET ORAL at 14:37

## 2025-02-22 RX ADMIN — DIAZEPAM 10 MG: 5 TABLET ORAL at 15:58

## 2025-02-22 RX ADMIN — DIAZEPAM 10 MG: 5 INJECTION INTRAMUSCULAR; INTRAVENOUS at 14:32

## 2025-02-22 RX ADMIN — PANTOPRAZOLE SODIUM 40 MG: 40 INJECTION, POWDER, FOR SOLUTION INTRAVENOUS at 14:34

## 2025-02-22 RX ADMIN — CLONIDINE HYDROCHLORIDE 0.1 MG: 0.1 TABLET ORAL at 17:37

## 2025-02-22 ASSESSMENT — LIFESTYLE VARIABLES
NAUSEA AND VOMITING: MILD NAUSEA WITH NO VOMITING
VISUAL DISTURBANCES: MILD SENSITIVITY
PAROXYSMAL SWEATS: NO SWEAT VISIBLE
HEADACHE, FULLNESS IN HEAD: MILD
AUDITORY DISTURBANCES: NOT PRESENT
ANXIETY: 2
AGITATION: MODERATELY FIDGETY AND RESTLESS
VISUAL DISTURBANCES: NOT PRESENT
ANXIETY: MODERATELY ANXIOUS, OR GUARDED, SO ANXIETY IS INFERRED
TREMOR: 3
AGITATION: MODERATELY FIDGETY AND RESTLESS
TREMOR: NOT VISIBLE, BUT CAN BE FELT FINGERTIP TO FINGERTIP
VISUAL DISTURBANCES: NOT PRESENT
TOTAL SCORE: 19
NAUSEA AND VOMITING: 3
TREMOR: MODERATE, WITH PATIENT'S ARMS EXTENDED
PAROXYSMAL SWEATS: BARELY PERCEPTIBLE SWEATING, PALMS MOIST
TOTAL SCORE: 8
HEADACHE, FULLNESS IN HEAD: MODERATELY SEVERE
HEADACHE, FULLNESS IN HEAD: MODERATELY SEVERE
ANXIETY: MODERATELY ANXIOUS, OR GUARDED, SO ANXIETY IS INFERRED
ORIENTATION AND CLOUDING OF SENSORIUM: ORIENTED AND CAN DO SERIAL ADDITIONS
TOTAL SCORE: 21
AUDITORY DISTURBANCES: NOT PRESENT
ORIENTATION AND CLOUDING OF SENSORIUM: ORIENTED AND CAN DO SERIAL ADDITIONS
PAROXYSMAL SWEATS: BARELY PERCEPTIBLE SWEATING, PALMS MOIST
AGITATION: NORMAL ACTIVITY
AUDITORY DISTURBANCES: NOT PRESENT
NAUSEA AND VOMITING: INTERMITTENT NAUSEA WITH DRY HEAVES
ORIENTATION AND CLOUDING OF SENSORIUM: ORIENTED AND CAN DO SERIAL ADDITIONS

## 2025-02-22 ASSESSMENT — ACTIVITIES OF DAILY LIVING (ADL)
ADLS_ACUITY_SCORE: 47
ADLS_ACUITY_SCORE: 47
ADLS_ACUITY_SCORE: 21
ADLS_ACUITY_SCORE: 47
ADLS_ACUITY_SCORE: 25
ADLS_ACUITY_SCORE: 47
ADLS_ACUITY_SCORE: 21

## 2025-02-22 ASSESSMENT — COLUMBIA-SUICIDE SEVERITY RATING SCALE - C-SSRS
6. HAVE YOU EVER DONE ANYTHING, STARTED TO DO ANYTHING, OR PREPARED TO DO ANYTHING TO END YOUR LIFE?: NO
1. IN THE PAST MONTH, HAVE YOU WISHED YOU WERE DEAD OR WISHED YOU COULD GO TO SLEEP AND NOT WAKE UP?: NO
2. HAVE YOU ACTUALLY HAD ANY THOUGHTS OF KILLING YOURSELF IN THE PAST MONTH?: NO

## 2025-02-22 NOTE — H&P
[facility]    History and Physical - Hospitalist Service       Date of Admission:  2/22/2025    Assessment & Plan   Jacquie Henderson is a 34 year old female admitted on 2/22/2025. She presents to the emergency department after binging on alcohol, asking for help.  She had 1 emesis episode this morning.  Her last alcohol shot at 10 AM.  She is having tremors but otherwise is not exhibiting any signs of inebriation despite of blood alcohol level of 0.32.    History of alcohol use and abuse.  Recent binging, last shot early this morning around 10 AM.  BAL on arrival 0.32 without signs of inebriation.   High risk for TANNER, no history of alcohol induced seizures.    Admit to inpatient.  Advance diet as tolerated.  Vital signs per CIWA protocol.  Hydration with D5  mL/h.  Electrolytes replace,ent protocol  Gabapentin taper per protocol.  Valium for trigger symptoms.  Multivitamins, folic acid, thiamine per protocol  Gastric protection with famotidine.  Zofran as needed for nausea or vomiting.    Anion gap metabolic acidosis.  Compensatory hypochloremia and hypocarbia.  Suspect a combination of fasting ketones and lactic acidosis of alcoholism.  IVF as above, advance diet as tolerated.    Mild elevation of AST secondary to alcohol toxicity.     Diet:  ADAT  DVT Prophylaxis: Pneumatic Compression Devices  Lamb Catheter: Not present  Lines: None     Cardiac Monitoring: None  Code Status:  FULL CODE    Clinically Significant Risk Factors Present on Admission          # Hypochloremia: Lowest Cl = 93 mmol/L in last 2 days, will monitor as appropriate     # Anion Gap Metabolic Acidosis: Highest Anion Gap = 28 mmol/L in last 2 days, will monitor and treat as appropriate              Disposition Plan     Medically Ready for Discharge: Anticipated in 2-4 Days       Chris Dominguez MD  Hospitalist Service  [facility]  Securely message with [employer] (more info)  Text page via [employer]/Directory     ______________________________________________________________________    Chief Complaint   Alcohol binging, nausea and vomiting.    History is obtained from the patient, electronic health record, and emergency department physician    History of Present Illness   Jacquie Henderson is a 34 year old female who has a history of alcohol use and abuse, she was admitted here in similar circumstances on 2024 and discharged to home on Antabuse.  She presented to the emergency department today after recent alcohol binging, one emesis episode this morning without blood content and body shakiness/tremors.  She came to the emergency department asking for help.  Her mentation is clear, she does not have any signs of inebriation despite of high BAL.  She does not smoke, she does not use any recreational drugs.  She has some discomfort in the epigastrium probably secondary to alcoholic gastritis versus reflux.  She is not having nausea at the moment of my visit.  She has had alcohol withdrawals in the past but not seizures.  Family history significant for mother who  from alcohol-related complications.    In the emergency department she was started on hydration with normal saline, pantoprazole IV, Maalox suspension, valium 10 mg IV.  EKG on admission shows sinus tachycardia 114, otherwise normal. Lipase is normal  Chest x-ray negative for infiltrates or any other abnormality.  Lab workup significant for anion gap metabolic acidosis, elevated ketones, mild AST elevation, hypochloremia, hypocarbia.    I discussed her case with Dr. Harrell, emergency department physician, who is requesting admission for further treatment and follow-up.    Past Medical History    No past medical history on file.    Past Surgical History   No past surgical history on file.    Prior to Admission Medications   Prior to Admission Medications   Prescriptions Last Dose Informant Patient Reported? Taking?    OVER-THE-COUNTER   Yes No   Sig: CBD w/ melatonin - take 1-2 at bedtime as needed for sleep   Probiotic Product (PROBIOTIC BLEND) CAPS   Yes No   Sig: Take 1 capsule by mouth daily   butalbital-acetaminophen-caffeine (ESGIC) -40 MG tablet   Yes No   Sig: Take 1 tablet by mouth every 6 hours as needed for migraine   disulfiram (ANTABUSE) 250 MG tablet   No No   Sig: Take 1 tablet (250 mg) by mouth daily   gabapentin (NEURONTIN) 300 MG capsule   No No   Sig: Take 1 capsule (300 mg) by mouth every 6 hours for 1 day, THEN 1 capsule (300 mg) every 8 hours for 1 day, THEN 1 capsule (300 mg) every 12 hours for 1 day.   melatonin 5 MG tablet   Yes No   Sig: Take 5 mg by mouth nightly as needed for sleep   multivitamin (ONE-DAILY) tablet   No No   Sig: Take 1 tablet by mouth daily      Facility-Administered Medications: None        Review of Systems    The 10 point Review of Systems is negative other than noted in the HPI or here.       Physical Exam   Vital Signs: Temp: 99.1  F (37.3  C) Temp src: Oral BP: 135/89 Pulse: 92   Resp: 10 SpO2: 99 % O2 Device: None (Room air)    Weight: 135 lbs 5.8 oz    GEN:  Alert, oriented x 3, appears comfortable, NAD.  HEENT:  Normocephalic/atraumatic, no scleral icterus, no nasal discharge, mouth moist.  CV:  Regular rate and rhythm, no murmur or JVD.  S1 + S2 noted, no S3 or S4.  LUNGS:  Clear to auscultation bilaterally without rales/rhonchi/wheezing/retractions.  Symmetric chest rise on inhalation noted.  ABD:  Active bowel sounds, soft, non-tender/non-distended.  No rebound/guarding/rigidity.  EXT:  No edema or cyanosis.  No joint synovitis noted.  SKIN:  Dry to touch, no exanthems noted in the visualized areas.         Medical Decision Making       75 MINUTES SPENT BY ME on the date of service doing chart review, history, exam, documentation & further activities per the note.      Data     I have personally reviewed the following data over the past 24 hrs:    4.4  \   14.4   /  334     140 93 (L) 10.1 /  80   4.0 19 (L) 0.63 \     ALT: 39 AST: 55 (H) AP: 64 TBILI: 0.6   ALB: 4.8 TOT PROTEIN: 8.3 LIPASE: 26     Trop: <6 BNP: N/A       Imaging results reviewed over the past 24 hrs:   No results found for this or any previous visit (from the past 24 hours).

## 2025-02-22 NOTE — ED PROVIDER NOTES
"Emergency Department Note      History of Present Illness     Chief Complaint   Chest Pain and Alcohol Intoxication    HPI   Jacquie Henderson is a 34 year old female who presents to the ER for evaluation of alcohol withdrawal.  She reports her last drink was around 10 or 11 this morning.  She reports drinking 6-7 alcoholic drinks daily.  She denies history of alcohol withdrawal seizure.  She reports a recent cold and some nondescript central chest discomfort.  She is not sure if the chest discomfort is from anxiety or vomiting.  No abdominal pain.  No diarrhea.  Patient has had some vomiting per above but no black or blood in the vomit or stool.    Past Medical History     Medical History and Problem List   No past medical history on file.    Medications   butalbital-acetaminophen-caffeine (ESGIC) -40 MG tablet  disulfiram (ANTABUSE) 250 MG tablet  gabapentin (NEURONTIN) 300 MG capsule  melatonin 5 MG tablet  multivitamin (ONE-DAILY) tablet  OVER-THE-COUNTER  Probiotic Product (PROBIOTIC BLEND) CAPS        Surgical History   No past surgical history on file.    Physical Exam     Patient Vitals for the past 24 hrs:   BP Temp Temp src Pulse Resp SpO2 Height Weight   02/22/25 1630 -- -- -- 100 17 97 % -- --   02/22/25 1615 -- -- -- 94 13 98 % -- --   02/22/25 1550 -- -- -- 92 10 99 % -- --   02/22/25 1545 -- -- -- 89 17 100 % -- --   02/22/25 1535 135/89 -- -- 95 -- 99 % -- --   02/22/25 1515 -- -- -- 99 10 97 % -- --   02/22/25 1505 -- -- -- 107 18 92 % -- --   02/22/25 1430 -- -- -- 105 16 95 % -- --   02/22/25 1426 (!) 128/99 -- -- 108 13 97 % -- --   02/22/25 1412 (!) 142/97 99.1  F (37.3  C) Oral 111 -- 96 % -- --   02/22/25 1355 (!) 169/100 97.9  F (36.6  C) Oral (!) 130 18 99 % 1.702 m (5' 7\") 61.4 kg (135 lb 5.8 oz)     Physical Exam  VS: Reviewed per above  HENT: Mucous membranes moist  EYES: sclera anicteric  CV: Rate as noted,  regular rhythm.   RESP: Effort normal. Breath sounds are normal " bilaterally.  GI: no tenderness/rebound/guarding, not distended.  NEURO: Alert, moving all extremities, GCS 15, no apparent tongue fasciculations or tremulousness.  Patient does have an anxious affect.  MSK: No deformity of the extremities  SKIN: Warm and dry      Diagnostics     Lab Results   Labs Ordered and Resulted from Time of ED Arrival to Time of ED Departure   COMPREHENSIVE METABOLIC PANEL - Abnormal       Result Value    Sodium 140      Potassium 4.0      Carbon Dioxide (CO2) 19 (*)     Anion Gap 28 (*)     Urea Nitrogen 10.1      Creatinine 0.63      GFR Estimate >90      Calcium 9.0      Chloride 93 (*)     Glucose 80      Alkaline Phosphatase 64      AST 55 (*)     ALT 39      Protein Total 8.3      Albumin 4.8      Bilirubin Total 0.6     KETONE BETA-HYDROXYBUTYRATE QUANTITATIVE, RAPID - Abnormal    Ketone (Beta-Hydroxybutyrate) Quantitative 1.62 (*)    ETHYL ALCOHOL LEVEL - Abnormal    Alcohol ethyl 0.32 (*)    TROPONIN T, HIGH SENSITIVITY - Normal    Troponin T, High Sensitivity <6     LIPASE - Normal    Lipase 26     HCG QUALITATIVE PREGNANCY - Normal    hCG Serum Qualitative Negative     MAGNESIUM - Normal    Magnesium 1.7     CBC WITH PLATELETS AND DIFFERENTIAL    WBC Count 4.4      RBC Count 4.74      Hemoglobin 14.4      Hematocrit 41.9      MCV 88      MCH 30.4      MCHC 34.4      RDW 12.3      Platelet Count 334      % Neutrophils 61      % Lymphocytes 32      % Monocytes 5      % Eosinophils 0      % Basophils 2      % Immature Granulocytes 0      NRBCs per 100 WBC 0      Absolute Neutrophils 2.7      Absolute Lymphocytes 1.4      Absolute Monocytes 0.2      Absolute Eosinophils 0.0      Absolute Basophils 0.1      Absolute Immature Granulocytes 0.0      Absolute NRBCs 0.0     MAGNESIUM   PHOSPHORUS   LACTIC ACID WHOLE BLOOD       Imaging   Chest XR,  PA & LAT   Final Result   IMPRESSION: Negative chest.  The lungs are clear and there are no pleural effusions. Normal heart size.          EKG    ECG results from 02/22/25   EKG 12-lead, tracing only     Value    Systolic Blood Pressure     Diastolic Blood Pressure     Ventricular Rate 114    Atrial Rate 114    CA Interval 156    QRS Duration 88        QTc 487    P Axis 62    R AXIS 55    T Axis 37    Interpretation ECG      Sinus tachycardia  Possible Left atrial enlargement  QTcB >= 480 msec  Abnormal ECG  When compared with ECG of 03-Feb-2025 18:54,  No significant change was found           ED Course      Medications Administered   Medications   thiamine (B-1) tablet 100 mg (100 mg Oral $Given 2/22/25 1437)   folic acid (FOLVITE) tablet 1 mg (1 mg Oral $Given 2/22/25 1437)   OLANZapine zydis (zyPREXA) ODT tab 5-10 mg (has no administration in time range)     Or   haloperidol lactate (HALDOL) injection 2.5-5 mg (has no administration in time range)   flumazenil (ROMAZICON) injection 0.2 mg (has no administration in time range)   melatonin tablet 5 mg (has no administration in time range)   cloNIDine (CATAPRES) tablet 0.1 mg (has no administration in time range)   diazepam (VALIUM) tablet 10 mg (has no administration in time range)     Or   diazepam (VALIUM) injection 5-10 mg (has no administration in time range)   gabapentin (NEURONTIN) tablet 1,200 mg (has no administration in time range)   gabapentin (NEURONTIN) capsule 900 mg (has no administration in time range)   gabapentin (NEURONTIN) capsule 600 mg (has no administration in time range)   gabapentin (NEURONTIN) capsule 300 mg (has no administration in time range)   gabapentin (NEURONTIN) capsule 100 mg (has no administration in time range)   multivitamin w/minerals (THERA-VIT-M) tablet 1 tablet (has no administration in time range)   folic acid injection 1 mg (has no administration in time range)   thiamine (B-1) injection 100 mg (has no administration in time range)   lidocaine 1 % 0.1-1 mL (has no administration in time range)   lidocaine (LMX4) cream (has no administration in time range)    sodium chloride (PF) 0.9% PF flush 3 mL (has no administration in time range)   sodium chloride (PF) 0.9% PF flush 3 mL (has no administration in time range)   senna-docusate (SENOKOT-S/PERICOLACE) 8.6-50 MG per tablet 1 tablet (has no administration in time range)     Or   senna-docusate (SENOKOT-S/PERICOLACE) 8.6-50 MG per tablet 2 tablet (has no administration in time range)   calcium carbonate (TUMS) chewable tablet 1,000 mg (has no administration in time range)   ondansetron (ZOFRAN ODT) ODT tab 4 mg (has no administration in time range)     Or   ondansetron (ZOFRAN) injection 4 mg (has no administration in time range)   famotidine (PEPCID) injection 20 mg (has no administration in time range)   dextrose 5% in lactated ringers infusion (has no administration in time range)   sodium chloride 0.9% BOLUS 1,000 mL (1,000 mLs Intravenous $New Bag 2/22/25 1441)   pantoprazole (PROTONIX) IV push injection 40 mg (40 mg Intravenous $Given 2/22/25 1434)   alum & mag hydroxide-simethicone (MAALOX) suspension 15 mL (15 mLs Oral $Given 2/22/25 1437)   sodium chloride 0.9% BOLUS 1,000 mL (1,000 mLs Intravenous $New Bag 2/22/25 1431)   diazepam (VALIUM) injection 10 mg (10 mg Intravenous $Given 2/22/25 1432)       Procedures   Procedures         Medical Decision Making / Diagnosis       MDM   Jacquie Henderson is a 34 year old female who presents to the ER for evaluation of vomiting and chest discomfort and concern for alcohol withdrawal.  On arrival she is tachycardic and hypertensive.  She expresses desire to be admitted for alcohol withdrawal treatment.  Chest pain evaluation is negative for signs of ACS, pneumothorax, aortic dissection, Boerhaave syndrome.  Suspect is related to esophagitis/gastritis in the setting of vomiting and alcohol misuse.  Abdominal exam is also benign.  Vital signs reveal ketosis and evidence of alcohol intoxication.  She was given some Valium previous to seeing this alcohol level without clear  effect on her level of mentation.  Patient was started on IV fluids to help with ketosis/dehydration and admitted for further monitoring and treatment of suspected early alcohol withdrawal.    Disposition   The patient was admitted to the hospital- Dr Dominguez.     Diagnosis     ICD-10-CM    1. Vomiting, unspecified vomiting type, unspecified whether nausea present  R11.10       2. Ketosis (H)  E88.89       3. Alcoholic intoxication without complication  F10.920            Discharge Medications   New Prescriptions    No medications on file        Mehrdad Camacho MD  02/22/25 7013

## 2025-02-22 NOTE — ED TRIAGE NOTES
"C/O CP mid sternal with tachycardia, sob, and diaphoresis. Pt admits to drinking 7 drinks daily for \"weeks,\" but denies ETOH problem. Denies seizure hx but states August 2024 hospitalization for withdrawal and multiple ED visits in past for ETOH. ABC in tact. A/Ox4     Triage Assessment (Adult)       Row Name 02/22/25 1353          Triage Assessment    Airway WDL WDL        Respiratory WDL    Respiratory WDL WDL        Skin Circulation/Temperature WDL    Skin Circulation/Temperature WDL WDL        Cardiac WDL    Cardiac WDL X;chest pain        Peripheral/Neurovascular WDL    Peripheral Neurovascular WDL WDL        Cognitive/Neuro/Behavioral WDL    Cognitive/Neuro/Behavioral WDL WDL                     "

## 2025-02-22 NOTE — PHARMACY-ADMISSION MEDICATION HISTORY
Pharmacy Intern Admission Medication History    Admission medication history is complete. The information provided in this note is only as accurate as the sources available at the time of the update.    Information Source(s): Patient and CareEverywhere/SureScripts via in-person    Pertinent Information: None     Changes made to PTA medication list:  Added: Collagen, Zofran   Deleted: Antabuse, Gabapentin   Changed: None    Allergies reviewed with patient and updates made in EHR: yes    Medication History Completed By: Diana Fernandez RPH 2/22/2025 5:40 PM    PTA Med List   Medication Sig Last Dose/Taking    butalbital-acetaminophen-caffeine (ESGIC) -40 MG tablet Take 1 tablet by mouth every 6 hours as needed for migraine Unknown    Collagen-Vitamin C-Biotin (COLLAGEN PO) Take by mouth daily. Taking    melatonin 5 MG tablet Take 5 mg by mouth nightly as needed for sleep Taking As Needed    multivitamin (ONE-DAILY) tablet Take 1 tablet by mouth daily Past Week    ondansetron (ZOFRAN) 4 MG tablet Take 4 mg by mouth every 8 hours as needed for nausea. Unknown    OVER-THE-COUNTER CBD w/ melatonin - take 1-2 at bedtime as needed for sleep Unknown    Probiotic Product (PROBIOTIC BLEND) CAPS Take 1 capsule by mouth daily Past Week

## 2025-02-22 NOTE — ED NOTES
"[facility]  ED Nurse Handoff Report    ED Chief complaint: Chest Pain and Alcohol Intoxication  . ED Diagnosis:   Final diagnoses:   Vomiting, unspecified vomiting type, unspecified whether nausea present   Ketosis (H)   Alcoholic intoxication without complication       Allergies:   Allergies   Allergen Reactions    Gluten Meal GI Disturbance       Code Status: Full Code    Activity level - Baseline/Home:  independent.  Activity Level - Current:   standby.   Lift room needed: No.   Bariatric: No   Needed: No   Isolation: No.   Infection: Not Applicable.     Respiratory status: Room air    Vital Signs (within 30 minutes):   Vitals:    02/22/25 1515 02/22/25 1535 02/22/25 1545 02/22/25 1550   BP:  135/89     Pulse: 99 95 89 92   Resp: 10  17 10   Temp:       TempSrc:       SpO2: 97% 99% 100% 99%   Weight:       Height:           Cardiac Rhythm:  ,   Cardiac  Cardiac Rhythm: Sinus tachycardia  Pain level:    Patient confused: No.   Patient Falls Risk: nonskid shoes/slippers when out of bed, arm band in place, patient and family education, and activity supervised.   Elimination Status: Has voided     Patient Report - Initial Complaint: ETOH, chest pain.   Focused Assessment:   ED arrival note:  C/O CP mid sternal with tachycardia, sob, and diaphoresis. Pt admits to drinking 7 drinks daily for \"weeks,\" but denies ETOH problem. Denies seizure hx but states August 2024 hospitalization for withdrawal and multiple ED visits in past for ETOH. ABC in tact. A/Ox4     C-SSRS (Daily/Shift Screen)Q2 Suicidal Thoughts (Since Last Contact): noQ6 Suicide Behavior: noLevel of Risk per Screen: no risk indicated  Behavioral HealthGeneral Appearance WDL: WDL  Behavior WDLBehavior WDL: WDL  Emotion Mood WDLEmotion/Mood/Affect WDL: WDL  Perceptual State WDLPerceptual State WDL: WDL  Speech WDLSpeech WDL: WDL  Thought Process WDLJudgment and Insight: judgment not appropriate to situation (pt denies need for " detox program/denies alcohol dependence)Thought Process WDL: .WDL except; judgment and insight  Chemical Abuse/AddictionsAlcohol: DailyWithdrawal Symptoms: Tremors; NauseaLast Use:: 02/22/25    Cardiac (Adult)Cardiac WDL: .WDL except; all (midsternal chest pain, associated slight SOB; pt unsure if this is anxiety related or not)  Chest Pain AssessmentChest Pain Intervention: cardiac monitoring continued; 12-lead ECG obtained    Abnormal Results:   Labs Ordered and Resulted from Time of ED Arrival to Time of ED Departure   COMPREHENSIVE METABOLIC PANEL - Abnormal       Result Value    Sodium 140      Potassium 4.0      Carbon Dioxide (CO2) 19 (*)     Anion Gap 28 (*)     Urea Nitrogen 10.1      Creatinine 0.63      GFR Estimate >90      Calcium 9.0      Chloride 93 (*)     Glucose 80      Alkaline Phosphatase 64      AST 55 (*)     ALT 39      Protein Total 8.3      Albumin 4.8      Bilirubin Total 0.6     KETONE BETA-HYDROXYBUTYRATE QUANTITATIVE, RAPID - Abnormal    Ketone (Beta-Hydroxybutyrate) Quantitative 1.62 (*)    ETHYL ALCOHOL LEVEL - Abnormal    Alcohol ethyl 0.32 (*)    TROPONIN T, HIGH SENSITIVITY - Normal    Troponin T, High Sensitivity <6     LIPASE - Normal    Lipase 26     HCG QUALITATIVE PREGNANCY - Normal    hCG Serum Qualitative Negative     MAGNESIUM - Normal    Magnesium 1.7     CBC WITH PLATELETS AND DIFFERENTIAL    WBC Count 4.4      RBC Count 4.74      Hemoglobin 14.4      Hematocrit 41.9      MCV 88      MCH 30.4      MCHC 34.4      RDW 12.3      Platelet Count 334      % Neutrophils 61      % Lymphocytes 32      % Monocytes 5      % Eosinophils 0      % Basophils 2      % Immature Granulocytes 0      NRBCs per 100 WBC 0      Absolute Neutrophils 2.7      Absolute Lymphocytes 1.4      Absolute Monocytes 0.2      Absolute Eosinophils 0.0      Absolute Basophils 0.1      Absolute Immature Granulocytes 0.0      Absolute NRBCs 0.0          Chest XR,  PA & LAT    (Results Pending)       Treatments  provided: See MAR  Family Comments: N/A  OBS brochure/video discussed/provided to patient:  N/A  ED Medications:   Medications   melatonin tablet 5 mg (has no administration in time range)   diazepam (VALIUM) tablet 10 mg (10 mg Oral $Given 2/22/25 1558)     Or   diazepam (VALIUM) injection 5-10 mg ( Intravenous See Alternative 2/22/25 1558)   thiamine (B-1) tablet 100 mg (100 mg Oral $Given 2/22/25 1437)   folic acid (FOLVITE) tablet 1 mg (1 mg Oral $Given 2/22/25 1437)   sodium chloride 0.9% BOLUS 1,000 mL (1,000 mLs Intravenous $New Bag 2/22/25 1441)   pantoprazole (PROTONIX) IV push injection 40 mg (40 mg Intravenous $Given 2/22/25 1434)   alum & mag hydroxide-simethicone (MAALOX) suspension 15 mL (15 mLs Oral $Given 2/22/25 1437)   sodium chloride 0.9% BOLUS 1,000 mL (1,000 mLs Intravenous $New Bag 2/22/25 1431)   diazepam (VALIUM) injection 10 mg (10 mg Intravenous $Given 2/22/25 1432)       Drips infusing:  No  For the majority of the shift this patient was Green.   Interventions performed were N/A.    Sepsis treatment initiated: No    Cares/treatment/interventions/medications to be completed following ED care: CIWA @1800    ED Nurse Name: Alley Munoz RN  4:28 PM    RECEIVING UNIT ED HANDOFF REVIEW    Above ED Nurse Handoff Report was reviewed: Yes  Reviewed by: Eli Coburn RN on February 22, 2025 at 5:21 PM   JULISA Arnold called the ED to inform them the note was read: Yes

## 2025-02-22 NOTE — LETTER
[place] [address] [facility]  [address] [facility] [address]  Phone: [phone]  Fax: [phone]    February 27, 2025        Jacquie Henderson  [address] [facility] [address]          To whom it may concern:    RE: Jacquie Henderson    Our patient was admitted and treated in our facility last February 22, 2025 to February 27, 2025 for medical illness    Please contact me for questions or concerns.      Sincerely,  Trent Delatorre MD

## 2025-02-23 ENCOUNTER — APPOINTMENT (OUTPATIENT)
Dept: CARDIOLOGY | Facility: CLINIC | Age: 35
End: 2025-02-23
Attending: INTERNAL MEDICINE
Payer: COMMERCIAL

## 2025-02-23 LAB
ALBUMIN SERPL BCG-MCNC: 3.5 G/DL (ref 3.5–5.2)
ALP SERPL-CCNC: 50 U/L (ref 40–150)
ALT SERPL W P-5'-P-CCNC: 28 U/L (ref 0–50)
ANION GAP SERPL CALCULATED.3IONS-SCNC: 9 MMOL/L (ref 7–15)
AST SERPL W P-5'-P-CCNC: 40 U/L (ref 0–45)
BASOPHILS # BLD AUTO: 0 10E3/UL (ref 0–0.2)
BASOPHILS NFR BLD AUTO: 1 %
BILIRUB SERPL-MCNC: 0.9 MG/DL
BUN SERPL-MCNC: 8.4 MG/DL (ref 6–20)
CALCIUM SERPL-MCNC: 8.2 MG/DL (ref 8.8–10.4)
CHLORIDE SERPL-SCNC: 99 MMOL/L (ref 98–107)
CREAT SERPL-MCNC: 0.67 MG/DL (ref 0.51–0.95)
EGFRCR SERPLBLD CKD-EPI 2021: >90 ML/MIN/1.73M2
EOSINOPHIL # BLD AUTO: 0 10E3/UL (ref 0–0.7)
EOSINOPHIL NFR BLD AUTO: 1 %
ERYTHROCYTE [DISTWIDTH] IN BLOOD BY AUTOMATED COUNT: 12.1 % (ref 10–15)
GLUCOSE SERPL-MCNC: 129 MG/DL (ref 70–99)
HCO3 SERPL-SCNC: 30 MMOL/L (ref 22–29)
HCT VFR BLD AUTO: 34.4 % (ref 35–47)
HGB BLD-MCNC: 11.6 G/DL (ref 11.7–15.7)
IMM GRANULOCYTES # BLD: 0 10E3/UL
IMM GRANULOCYTES NFR BLD: 0 %
LVEF ECHO: NORMAL
LYMPHOCYTES # BLD AUTO: 1.2 10E3/UL (ref 0.8–5.3)
LYMPHOCYTES NFR BLD AUTO: 37 %
MAGNESIUM SERPL-MCNC: 2.1 MG/DL (ref 1.7–2.3)
MCH RBC QN AUTO: 30.1 PG (ref 26.5–33)
MCHC RBC AUTO-ENTMCNC: 33.7 G/DL (ref 31.5–36.5)
MCV RBC AUTO: 89 FL (ref 78–100)
MONOCYTES # BLD AUTO: 0.4 10E3/UL (ref 0–1.3)
MONOCYTES NFR BLD AUTO: 12 %
NEUTROPHILS # BLD AUTO: 1.7 10E3/UL (ref 1.6–8.3)
NEUTROPHILS NFR BLD AUTO: 49 %
NRBC # BLD AUTO: 0 10E3/UL
NRBC BLD AUTO-RTO: 0 /100
PLATELET # BLD AUTO: 217 10E3/UL (ref 150–450)
POTASSIUM SERPL-SCNC: 4.6 MMOL/L (ref 3.4–5.3)
PROT SERPL-MCNC: 6 G/DL (ref 6.4–8.3)
RBC # BLD AUTO: 3.86 10E6/UL (ref 3.8–5.2)
SODIUM SERPL-SCNC: 138 MMOL/L (ref 135–145)
WBC # BLD AUTO: 3.4 10E3/UL (ref 4–11)

## 2025-02-23 PROCEDURE — 99233 SBSQ HOSP IP/OBS HIGH 50: CPT | Performed by: INTERNAL MEDICINE

## 2025-02-23 PROCEDURE — 120N000001 HC R&B MED SURG/OB

## 2025-02-23 PROCEDURE — 250N000013 HC RX MED GY IP 250 OP 250 PS 637: Performed by: INTERNAL MEDICINE

## 2025-02-23 PROCEDURE — 82374 ASSAY BLOOD CARBON DIOXIDE: CPT | Performed by: HOSPITALIST

## 2025-02-23 PROCEDURE — 93306 TTE W/DOPPLER COMPLETE: CPT | Mod: 26 | Performed by: INTERNAL MEDICINE

## 2025-02-23 PROCEDURE — 83735 ASSAY OF MAGNESIUM: CPT | Performed by: HOSPITALIST

## 2025-02-23 PROCEDURE — 85004 AUTOMATED DIFF WBC COUNT: CPT | Performed by: HOSPITALIST

## 2025-02-23 PROCEDURE — 36415 COLL VENOUS BLD VENIPUNCTURE: CPT | Performed by: HOSPITALIST

## 2025-02-23 PROCEDURE — 250N000013 HC RX MED GY IP 250 OP 250 PS 637: Performed by: HOSPITALIST

## 2025-02-23 PROCEDURE — 82247 BILIRUBIN TOTAL: CPT | Performed by: HOSPITALIST

## 2025-02-23 PROCEDURE — 250N000013 HC RX MED GY IP 250 OP 250 PS 637: Performed by: EMERGENCY MEDICINE

## 2025-02-23 PROCEDURE — 85048 AUTOMATED LEUKOCYTE COUNT: CPT | Performed by: HOSPITALIST

## 2025-02-23 PROCEDURE — 250N000011 HC RX IP 250 OP 636: Performed by: HOSPITALIST

## 2025-02-23 PROCEDURE — 93306 TTE W/DOPPLER COMPLETE: CPT

## 2025-02-23 RX ORDER — HYDROXYZINE HYDROCHLORIDE 25 MG/1
25 TABLET, FILM COATED ORAL EVERY 6 HOURS PRN
Status: DISCONTINUED | OUTPATIENT
Start: 2025-02-23 | End: 2025-02-27 | Stop reason: HOSPADM

## 2025-02-23 RX ORDER — FAMOTIDINE 20 MG/1
20 TABLET, FILM COATED ORAL 2 TIMES DAILY
Status: DISCONTINUED | OUTPATIENT
Start: 2025-02-23 | End: 2025-02-27 | Stop reason: HOSPADM

## 2025-02-23 RX ORDER — CALCIUM CARBONATE 500(1250)
500 TABLET ORAL 2 TIMES DAILY WITH MEALS
Status: DISCONTINUED | OUTPATIENT
Start: 2025-02-23 | End: 2025-02-27 | Stop reason: HOSPADM

## 2025-02-23 RX ORDER — SERTRALINE HYDROCHLORIDE 25 MG/1
25 TABLET, FILM COATED ORAL DAILY
Status: DISCONTINUED | OUTPATIENT
Start: 2025-02-23 | End: 2025-02-27 | Stop reason: HOSPADM

## 2025-02-23 RX ADMIN — FAMOTIDINE 20 MG: 10 INJECTION, SOLUTION INTRAVENOUS at 05:19

## 2025-02-23 RX ADMIN — CLONIDINE HYDROCHLORIDE 0.1 MG: 0.1 TABLET ORAL at 09:06

## 2025-02-23 RX ADMIN — Medication 1 TABLET: at 09:06

## 2025-02-23 RX ADMIN — GABAPENTIN 900 MG: 300 CAPSULE ORAL at 09:07

## 2025-02-23 RX ADMIN — CALCIUM 500 MG: 500 TABLET ORAL at 13:46

## 2025-02-23 RX ADMIN — GABAPENTIN 900 MG: 300 CAPSULE ORAL at 17:16

## 2025-02-23 RX ADMIN — HYDROXYZINE HYDROCHLORIDE 25 MG: 25 TABLET, FILM COATED ORAL at 19:49

## 2025-02-23 RX ADMIN — ACETAMINOPHEN 650 MG: 325 TABLET, FILM COATED ORAL at 09:06

## 2025-02-23 RX ADMIN — FOLIC ACID 1 MG: 1 TABLET ORAL at 09:06

## 2025-02-23 RX ADMIN — FAMOTIDINE 20 MG: 20 TABLET, FILM COATED ORAL at 22:15

## 2025-02-23 RX ADMIN — ACETAMINOPHEN 650 MG: 325 TABLET, FILM COATED ORAL at 17:36

## 2025-02-23 RX ADMIN — Medication 5 MG: at 22:15

## 2025-02-23 RX ADMIN — CLONIDINE HYDROCHLORIDE 0.1 MG: 0.1 TABLET ORAL at 17:16

## 2025-02-23 RX ADMIN — DIAZEPAM 10 MG: 10 TABLET ORAL at 17:36

## 2025-02-23 RX ADMIN — GABAPENTIN 900 MG: 300 CAPSULE ORAL at 02:02

## 2025-02-23 RX ADMIN — CALCIUM 500 MG: 500 TABLET ORAL at 17:16

## 2025-02-23 RX ADMIN — THIAMINE HYDROCHLORIDE 100 MG: 100 INJECTION, SOLUTION INTRAMUSCULAR; INTRAVENOUS at 09:05

## 2025-02-23 RX ADMIN — CLONIDINE HYDROCHLORIDE 0.1 MG: 0.1 TABLET ORAL at 02:02

## 2025-02-23 RX ADMIN — SERTRALINE HYDROCHLORIDE 25 MG: 25 TABLET ORAL at 10:46

## 2025-02-23 RX ADMIN — DIAZEPAM 10 MG: 10 TABLET ORAL at 05:17

## 2025-02-23 RX ADMIN — DIAZEPAM 10 MG: 10 TABLET ORAL at 22:25

## 2025-02-23 RX ADMIN — SODIUM CHLORIDE, SODIUM LACTATE, POTASSIUM CHLORIDE, CALCIUM CHLORIDE AND DEXTROSE MONOHYDRATE: 5; 600; 310; 30; 20 INJECTION, SOLUTION INTRAVENOUS at 02:15

## 2025-02-23 RX ADMIN — DIAZEPAM 10 MG: 10 TABLET ORAL at 02:08

## 2025-02-23 ASSESSMENT — ACTIVITIES OF DAILY LIVING (ADL)
ADLS_ACUITY_SCORE: 25

## 2025-02-23 NOTE — PLAN OF CARE
"Pt settled from ED.  A&Ox4.  Tele ST.  On RA.  Reg diet.  Moves SBA.  CIWA score of 10, tremors, anxiety. Valium PO given.  PRN tylenol given for headache.    Goal Outcome Evaluation:      Plan of Care Reviewed With: patient    Overall Patient Progress: improvingOverall Patient Progress: improving    Outcome Evaluation: Scoring ~9 on CIWAs.  SBA.      Problem: Adult Inpatient Plan of Care  Goal: Plan of Care Review  Description: The Plan of Care Review/Shift note should be completed every shift.  The Outcome Evaluation is a brief statement about your assessment that the patient is improving, declining, or no change.  This information will be displayed automatically on your shift  note.  Outcome: Progressing  Flowsheets (Taken 2/22/2025 2249)  Outcome Evaluation: Scoring ~9 on CIWAs.  SBA.  Plan of Care Reviewed With: patient  Overall Patient Progress: improving  Goal: Patient-Specific Goal (Individualized)  Description: You can add care plan individualizations to a care plan. Examples of Individualization might be:  \"Parent requests to be called daily at 9am for status\", \"I have a hard time hearing out of my right ear\", or \"Do not touch me to wake me up as it startles  me\".  Outcome: Progressing  Goal: Absence of Hospital-Acquired Illness or Injury  Outcome: Progressing  Intervention: Identify and Manage Fall Risk  Recent Flowsheet Documentation  Taken 2/22/2025 2109 by Lucille Buitrago, RN  Safety Promotion/Fall Prevention:   activity supervised   nonskid shoes/slippers when out of bed   safety round/check completed   supervised activity  Goal: Optimal Comfort and Wellbeing  Outcome: Progressing  Goal: Readiness for Transition of Care  Outcome: Progressing     Problem: Skin Injury Risk Increased  Goal: Skin Health and Integrity  Outcome: Progressing     Problem: Alcohol Withdrawal  Goal: Alcohol Withdrawal Symptom Control  Outcome: Progressing  Goal: Optimal Neurologic Function  Outcome: Progressing  Goal: " Readiness for Change Identified  Outcome: Progressing

## 2025-02-23 NOTE — PLAN OF CARE
"A&Ox4. SB assist. Reg diet. Tele SR CVR. On Gabapentin, thiamine, and folic acid. Scoring below 7 on CIWA no valium given. PRN tylenol given, effective. IV cont fluids discontinued. Plan to discharge when medically ready.           Goal Outcome Evaluation:      Plan of Care Reviewed With: patient    Overall Patient Progress: improvingOverall Patient Progress: improving    Outcome Evaluation: Scoring on CIWAs.      Problem: Adult Inpatient Plan of Care  Goal: Plan of Care Review  Description: The Plan of Care Review/Shift note should be completed every shift.  The Outcome Evaluation is a brief statement about your assessment that the patient is improving, declining, or no change.  This information will be displayed automatically on your shift  note.  Outcome: Progressing  Flowsheets (Taken 2/23/2025 1444)  Outcome Evaluation: Scoring on CIWAs.  Plan of Care Reviewed With: patient  Overall Patient Progress: improving  Goal: Patient-Specific Goal (Individualized)  Description: You can add care plan individualizations to a care plan. Examples of Individualization might be:  \"Parent requests to be called daily at 9am for status\", \"I have a hard time hearing out of my right ear\", or \"Do not touch me to wake me up as it startles  me\".  Outcome: Progressing  Goal: Absence of Hospital-Acquired Illness or Injury  Outcome: Progressing  Intervention: Identify and Manage Fall Risk  Recent Flowsheet Documentation  Taken 2/23/2025 0849 by Peña Betancourt RN  Safety Promotion/Fall Prevention: safety round/check completed  Intervention: Prevent Skin Injury  Recent Flowsheet Documentation  Taken 2/23/2025 0849 by Peña Betancourt RN  Body Position: position changed independently  Intervention: Prevent Infection  Recent Flowsheet Documentation  Taken 2/23/2025 0849 by Peña Betancourt RN  Infection Prevention:   hand hygiene promoted   rest/sleep promoted   single patient room provided  Goal: Optimal Comfort and " Wellbeing  Outcome: Progressing  Intervention: Monitor Pain and Promote Comfort  Recent Flowsheet Documentation  Taken 2/23/2025 0849 by Peña Betancourt, RN  Pain Management Interventions: medication (see MAR)  Goal: Readiness for Transition of Care  Outcome: Progressing     Problem: Skin Injury Risk Increased  Goal: Skin Health and Integrity  Outcome: Progressing  Intervention: Optimize Skin Protection  Recent Flowsheet Documentation  Taken 2/23/2025 0849 by Peña Betancourt, RN  Head of Bed (HOB) Positioning: HOB at 60-90 degrees     Problem: Alcohol Withdrawal  Goal: Alcohol Withdrawal Symptom Control  Outcome: Progressing  Goal: Optimal Neurologic Function  Outcome: Progressing  Goal: Readiness for Change Identified  Outcome: Progressing

## 2025-02-23 NOTE — PLAN OF CARE
[id number]    VSS on RA. A/O x4. Denies CP, SOB. C/o moderate headache, scoring on CIWAs, valium given per MAR. Up SBA, has tremors. D5 LR running at 125 mL/hr. Continue CIWAs.     Goal Outcome Evaluation:      Plan of Care Reviewed With: patient    Overall Patient Progress: no changeOverall Patient Progress: no change    Outcome Evaluation: Scoring on CIWAs      Problem: Adult Inpatient Plan of Care  Goal: Plan of Care Review  Description: The Plan of Care Review/Shift note should be completed every shift.  The Outcome Evaluation is a brief statement about your assessment that the patient is improving, declining, or no change.  This information will be displayed automatically on your shift  note.  Outcome: Not Progressing  Flowsheets (Taken 2/23/2025 0347)  Outcome Evaluation: Scoring on CIWAs  Plan of Care Reviewed With: patient  Overall Patient Progress: no change Goal: Absence of Hospital-Acquired Illness or Injury  Outcome: Not Progressing  Intervention: Prevent Skin Injury  Recent Flowsheet Documentation  Taken 2/23/2025 0223 by Brenda Ponce RN  Body Position: position changed independently  Intervention: Prevent Infection  Recent Flowsheet Documentation  Taken 2/22/2025 2336 by Brenda Ponce RN  Infection Prevention: rest/sleep promoted  Goal: Optimal Comfort and Wellbeing  Outcome: Not Progressing  Intervention: Monitor Pain and Promote Comfort  Recent Flowsheet Documentation  Taken 2/23/2025 0205 by Brenda Ponce RN  Pain Management Interventions: medication (see MAR)  Goal: Readiness for Transition of Care  Outcome: Not Progressing     Problem: Skin Injury Risk Increased  Goal: Skin Health and Integrity  Outcome: Not Progressing  Intervention: Optimize Skin Protection  Recent Flowsheet Documentation  Taken 2/23/2025 0223 by Brenda Ponce RN  Activity Management:   ambulated to bathroom   back to bed  Head of Bed (HOB) Positioning: HOB at 20-30 degrees     Problem: Alcohol  Withdrawal  Goal: Alcohol Withdrawal Symptom Control  Outcome: Not Progressing  Goal: Optimal Neurologic Function  Outcome: Not Progressing  Goal: Readiness for Change Identified  Outcome: Not Progressing

## 2025-02-23 NOTE — ED NOTES
Magnesium replaced, recheck in morning. Potassium recheck in morning. Scored 6 on ciwa scale.     Lab called with critical lactic of 4.2. Paged cross cover, then paged Dr Dominguez, awaiting orders.

## 2025-02-23 NOTE — ED NOTES
[facility]    ED Boarding Nurse Handoff Addendum Report:    Date/time: 2/22/2025, 7:25 PM    Neuro: Alert and Oriented x4  Activity: are SBA with no assistive devices   Telemetry Monitoring: Yes - tachycardic  Pain: complaining of 4/10 pain in their head.  Valium given, not effective. Pt requesting tylenol, crosscover paged.  Labs / Tests: Mag and K protocol. Recheck scheduled for AM.  GI: Nauseous, Pt states nausea is from lack of po intake. Jello and apple juice given, tolerated well. Pt requested pre-made lunch box, tolerated well.  O2: RA  CIWA: 13, 9. Pt declining more valium per protocol. Pt requesting tylenol for headache.  LDA's: Peripheral  Fluids: has dextrose 5% and LR infusion running at 125 mL per hour.  Diet: Clear liquid  Consults: Social Work or Care Coordination, Nutrition, and chemical dependence  Discharge Disposition:  TBD    Vital signs (within last 30 minutes):    Vitals:    02/22/25 1550 02/22/25 1615 02/22/25 1630 02/22/25 1900   BP:    113/69   BP Location:    Left arm   Pulse: 92 94 100 92   Resp: 10 13 17    Temp:    98.2  F (36.8  C)   TempSrc:    Oral   SpO2: 99% 98% 97% 95%   Weight:       Height:           ED Boarding Nurse name: Maura Fry RN

## 2025-02-23 NOTE — PROGRESS NOTES
[facility]    Medicine Progress Note - Hospitalist Service    Date of Admission:  2/22/2025    Assessment & Plan     Jacquie Henderson is a 34 year old female admitted on 2/22/2025. She presents to the emergency department after binging on alcohol, asking for help.  She was having tremors but otherwise is not exhibiting any signs of inebriation despite of blood alcohol level of 0.32.     Alcohol use disorder.  Alcohol intoxication.  Acute alcohol withdrawals.  -EtOH 0.32 at time of presentation.  -Continues having tremors.  -Continue scheduled clonidine 0.1 mg every 8 hours with hold parameters.  -Continue gabapentin taper protocol.  -Vitamins with folic acid, thiamine, multivitamin.  -[facility] protocol monitoring.  -Diazepam if needed.  -Chemical dependency consult.  -Stop IV fluids.    Starvation ketosis.  -Ketones elevated at 1.62 at time of presentation.  -Has been on IV fluids with dextrose.  -Now tolerating diet.  -Stop IV fluids.  -Encouraged continued good oral intake.  -Recheck ketones tomorrow.    Anxiety.  Depression.  -Would like to restart sertraline.  -Start sertraline 25 mg daily.  -Psychiatry consult.    Lactic acidosis.  -Likely due to alcohol intoxication and dehydration.  -Recheck lactic acid tomorrow.    Hypocalcemia.  -Start calcium carbonate 500 mg twice a day.    Anemia.  Leukopenia.  -Recheck CBC tomorrow.              Diet: Combination Diet Regular Diet    DVT Prophylaxis: Pneumatic Compression Devices  Lamb Catheter: Not present  Lines: None     Cardiac Monitoring: ACTIVE order. Indication: Electrolyte Imbalance (24 hours)- Magnesium <1.3 mg/ml; Potassium < =2.8 or > 5.5 mg/ml  Code Status: Full Code      Clinically Significant Risk Factors Present on Admission          # Hypochloremia: Lowest Cl = 93 mmol/L in last 2 days, will monitor as appropriate  # Hypocalcemia: Lowest Ca = 8.2 mg/dL in last 2 days, will monitor and replace as appropriate    # Anion Gap Metabolic  Acidosis: Highest Anion Gap = 28 mmol/L in last 2 days, will monitor and treat as appropriate                           Social Drivers of Health            Disposition Plan     Medically Ready for Discharge: Anticipated Tomorrow             Paul Ramirez DO  Hospitalist Service  [facility]  Securely message with [employer] (more info)  Text page via [employer] Paging/Directory   ______________________________________________________________________    Interval History   9 having tremors.  Occasional nausea.  Feels shaky.  Feels depressed.  Denies chest pain, shortness of breath, fevers, chills.    Physical Exam   Vital Signs: Temp: 98.1  F (36.7  C) Temp src: Oral BP: 119/77 Pulse: 81   Resp: 16 SpO2: 99 % O2 Device: None (Room air)    Weight: 141 lbs 12.8 oz    Gen:  NAD, A&Ox3.  Eyes:  PERRL, sclera anicteric.  OP:  MMM, no lesions.  Neck:  Supple.  CV:  Regular, no murmurs.  Lung:  CTA b/l, normal effort.  Ab:  +BS, soft.  Skin:  Warm, dry to touch.  No rash.  Ext:  No pitting edema LE b/l.      Medical Decision Making       52 MINUTES SPENT BY ME on the date of service doing chart review, history, exam, documentation & further activities per the note.      Data     I have personally reviewed the following data over the past 24 hrs:    3.4 (L)  \   11.6 (L)   / 217     138 99 8.4 /  129 (H)   4.6 30 (H) 0.67 \     ALT: 28 AST: 40 AP: 50 TBILI: 0.9   ALB: 3.5 TOT PROTEIN: 6.0 (L) LIPASE: 26     Trop: <6 BNP: N/A     Procal: N/A CRP: N/A Lactic Acid: 4.2 (HH)         Imaging results reviewed over the past 24 hrs:   Recent Results (from the past 24 hours)   Chest XR,  PA & LAT    Narrative    EXAM: XR CHEST 2 VIEWS  LOCATION: [facility]  DATE: 2/22/2025    INDICATION: chest pain  COMPARISON: X-ray 11/8/2024      Impression    IMPRESSION: Negative chest.  The lungs are clear and there are no pleural effusions. Normal heart size.

## 2025-02-24 ENCOUNTER — APPOINTMENT (OUTPATIENT)
Dept: GENERAL RADIOLOGY | Facility: CLINIC | Age: 35
DRG: 897 | End: 2025-02-24
Attending: INTERNAL MEDICINE
Payer: COMMERCIAL

## 2025-02-24 LAB
ALBUMIN SERPL BCG-MCNC: 4.1 G/DL (ref 3.5–5.2)
ALP SERPL-CCNC: 71 U/L (ref 40–150)
ALT SERPL W P-5'-P-CCNC: 35 U/L (ref 0–50)
ANION GAP SERPL CALCULATED.3IONS-SCNC: 8 MMOL/L (ref 7–15)
AST SERPL W P-5'-P-CCNC: 41 U/L (ref 0–45)
ATRIAL RATE - MUSE: 114 BPM
B-OH-BUTYR SERPL-SCNC: <0.18 MMOL/L
BILIRUB SERPL-MCNC: 0.6 MG/DL
BUN SERPL-MCNC: 7.5 MG/DL (ref 6–20)
CALCIUM SERPL-MCNC: 9.5 MG/DL (ref 8.8–10.4)
CHLORIDE SERPL-SCNC: 99 MMOL/L (ref 98–107)
CREAT SERPL-MCNC: 0.67 MG/DL (ref 0.51–0.95)
DIASTOLIC BLOOD PRESSURE - MUSE: NORMAL MMHG
EGFRCR SERPLBLD CKD-EPI 2021: >90 ML/MIN/1.73M2
ERYTHROCYTE [DISTWIDTH] IN BLOOD BY AUTOMATED COUNT: 12.2 % (ref 10–15)
GLUCOSE BLDC GLUCOMTR-MCNC: 134 MG/DL (ref 70–99)
GLUCOSE SERPL-MCNC: 106 MG/DL (ref 70–99)
HCO3 SERPL-SCNC: 29 MMOL/L (ref 22–29)
HCT VFR BLD AUTO: 38.2 % (ref 35–47)
HGB BLD-MCNC: 12.8 G/DL (ref 11.7–15.7)
INTERPRETATION ECG - MUSE: NORMAL
LACTATE SERPL-SCNC: 1 MMOL/L (ref 0.7–2)
MAGNESIUM SERPL-MCNC: 1.6 MG/DL (ref 1.7–2.3)
MCH RBC QN AUTO: 30.5 PG (ref 26.5–33)
MCHC RBC AUTO-ENTMCNC: 33.5 G/DL (ref 31.5–36.5)
MCV RBC AUTO: 91 FL (ref 78–100)
P AXIS - MUSE: 62 DEGREES
PHOSPHATE SERPL-MCNC: 2.9 MG/DL (ref 2.5–4.5)
PLATELET # BLD AUTO: 186 10E3/UL (ref 150–450)
POTASSIUM SERPL-SCNC: 4.3 MMOL/L (ref 3.4–5.3)
PR INTERVAL - MUSE: 156 MS
PROT SERPL-MCNC: 6.8 G/DL (ref 6.4–8.3)
QRS DURATION - MUSE: 88 MS
QT - MUSE: 354 MS
QTC - MUSE: 487 MS
R AXIS - MUSE: 55 DEGREES
RBC # BLD AUTO: 4.2 10E6/UL (ref 3.8–5.2)
SODIUM SERPL-SCNC: 136 MMOL/L (ref 135–145)
SYSTOLIC BLOOD PRESSURE - MUSE: NORMAL MMHG
T AXIS - MUSE: 37 DEGREES
VENTRICULAR RATE- MUSE: 114 BPM
WBC # BLD AUTO: 3.6 10E3/UL (ref 4–11)

## 2025-02-24 PROCEDURE — 83605 ASSAY OF LACTIC ACID: CPT | Performed by: INTERNAL MEDICINE

## 2025-02-24 PROCEDURE — 250N000013 HC RX MED GY IP 250 OP 250 PS 637: Performed by: INTERNAL MEDICINE

## 2025-02-24 PROCEDURE — 85018 HEMOGLOBIN: CPT | Performed by: INTERNAL MEDICINE

## 2025-02-24 PROCEDURE — 120N000001 HC R&B MED SURG/OB

## 2025-02-24 PROCEDURE — 83735 ASSAY OF MAGNESIUM: CPT | Performed by: INTERNAL MEDICINE

## 2025-02-24 PROCEDURE — 82010 KETONE BODYS QUAN: CPT | Performed by: INTERNAL MEDICINE

## 2025-02-24 PROCEDURE — 99222 1ST HOSP IP/OBS MODERATE 55: CPT

## 2025-02-24 PROCEDURE — 74019 RADEX ABDOMEN 2 VIEWS: CPT

## 2025-02-24 PROCEDURE — 85048 AUTOMATED LEUKOCYTE COUNT: CPT | Performed by: INTERNAL MEDICINE

## 2025-02-24 PROCEDURE — 36415 COLL VENOUS BLD VENIPUNCTURE: CPT | Performed by: INTERNAL MEDICINE

## 2025-02-24 PROCEDURE — 84155 ASSAY OF PROTEIN SERUM: CPT | Performed by: INTERNAL MEDICINE

## 2025-02-24 PROCEDURE — 99232 SBSQ HOSP IP/OBS MODERATE 35: CPT | Performed by: INTERNAL MEDICINE

## 2025-02-24 PROCEDURE — 250N000011 HC RX IP 250 OP 636: Performed by: HOSPITALIST

## 2025-02-24 PROCEDURE — 84100 ASSAY OF PHOSPHORUS: CPT | Performed by: INTERNAL MEDICINE

## 2025-02-24 PROCEDURE — 250N000013 HC RX MED GY IP 250 OP 250 PS 637: Performed by: HOSPITALIST

## 2025-02-24 PROCEDURE — 250N000013 HC RX MED GY IP 250 OP 250 PS 637: Performed by: EMERGENCY MEDICINE

## 2025-02-24 RX ORDER — MAGNESIUM OXIDE 400 MG/1
400 TABLET ORAL EVERY 4 HOURS
Status: COMPLETED | OUTPATIENT
Start: 2025-02-24 | End: 2025-02-24

## 2025-02-24 RX ORDER — CLONIDINE HYDROCHLORIDE 0.1 MG/1
0.05 TABLET ORAL EVERY 8 HOURS
Status: DISCONTINUED | OUTPATIENT
Start: 2025-02-24 | End: 2025-02-27 | Stop reason: HOSPADM

## 2025-02-24 RX ADMIN — DIAZEPAM 10 MG: 10 TABLET ORAL at 13:24

## 2025-02-24 RX ADMIN — DIAZEPAM 10 MG: 10 TABLET ORAL at 21:22

## 2025-02-24 RX ADMIN — DIAZEPAM 10 MG: 10 TABLET ORAL at 02:37

## 2025-02-24 RX ADMIN — CALCIUM 500 MG: 500 TABLET ORAL at 17:29

## 2025-02-24 RX ADMIN — GABAPENTIN 900 MG: 300 CAPSULE ORAL at 01:51

## 2025-02-24 RX ADMIN — DIAZEPAM 10 MG: 10 TABLET ORAL at 06:42

## 2025-02-24 RX ADMIN — FOLIC ACID 1 MG: 1 TABLET ORAL at 09:19

## 2025-02-24 RX ADMIN — SERTRALINE HYDROCHLORIDE 25 MG: 25 TABLET ORAL at 09:19

## 2025-02-24 RX ADMIN — DIAZEPAM 10 MG: 10 TABLET ORAL at 18:54

## 2025-02-24 RX ADMIN — DIAZEPAM 10 MG: 10 TABLET ORAL at 09:34

## 2025-02-24 RX ADMIN — FAMOTIDINE 20 MG: 20 TABLET, FILM COATED ORAL at 09:21

## 2025-02-24 RX ADMIN — CLONIDINE HYDROCHLORIDE 0.1 MG: 0.1 TABLET ORAL at 01:56

## 2025-02-24 RX ADMIN — CALCIUM 500 MG: 500 TABLET ORAL at 09:19

## 2025-02-24 RX ADMIN — FAMOTIDINE 20 MG: 20 TABLET, FILM COATED ORAL at 21:17

## 2025-02-24 RX ADMIN — DIAZEPAM 10 MG: 10 TABLET ORAL at 16:10

## 2025-02-24 RX ADMIN — CLONIDINE HYDROCHLORIDE 0.1 MG: 0.1 TABLET ORAL at 09:19

## 2025-02-24 RX ADMIN — GABAPENTIN 900 MG: 300 CAPSULE ORAL at 09:19

## 2025-02-24 RX ADMIN — Medication 1 TABLET: at 09:20

## 2025-02-24 RX ADMIN — DIAZEPAM 10 MG: 10 TABLET ORAL at 17:29

## 2025-02-24 RX ADMIN — GABAPENTIN 900 MG: 300 CAPSULE ORAL at 17:29

## 2025-02-24 RX ADMIN — THIAMINE HYDROCHLORIDE 100 MG: 100 INJECTION, SOLUTION INTRAMUSCULAR; INTRAVENOUS at 09:21

## 2025-02-24 RX ADMIN — CLONIDINE HYDROCHLORIDE 0.05 MG: 0.1 TABLET ORAL at 18:45

## 2025-02-24 RX ADMIN — ACETAMINOPHEN 650 MG: 325 TABLET, FILM COATED ORAL at 02:36

## 2025-02-24 RX ADMIN — Medication 400 MG: at 13:10

## 2025-02-24 RX ADMIN — Medication 400 MG: at 09:34

## 2025-02-24 ASSESSMENT — ACTIVITIES OF DAILY LIVING (ADL)
ADLS_ACUITY_SCORE: 25
ADLS_ACUITY_SCORE: 26
ADLS_ACUITY_SCORE: 25
ADLS_ACUITY_SCORE: 26
ADLS_ACUITY_SCORE: 25
ADLS_ACUITY_SCORE: 26
ADLS_ACUITY_SCORE: 25
ADLS_ACUITY_SCORE: 25
ADLS_ACUITY_SCORE: 26
ADLS_ACUITY_SCORE: 25
ADLS_ACUITY_SCORE: 26
ADLS_ACUITY_SCORE: 25
ADLS_ACUITY_SCORE: 26

## 2025-02-24 NOTE — CONSULTS
"CLINICAL NUTRITION SERVICES - ASSESSMENT NOTE    Malnutrition Status:    % Intake: Decreased intake does not meet criteria  % Weight Loss: None noted  Subcutaneous Fat Loss: None observed  Muscle Loss: None observed  Fluid Accumulation/Edema: None noted or documented    Malnutrition Diagnosis: Patient does not meet two of the established criteria necessary for diagnosing malnutrition  Malnutrition Present on Admission: No     Registered Dietitian Interventions:  - Diet as ordered.  - Ordered an add on phosphorus w/ last check on 2/22 and high rate D5-IVFs running in the interim.  Please replace if found to be low.  - Continue MVI/M.        REASON FOR ASSESSMENT  Provider order - \"alcohol withdrawal\"    PMH of: Etoh use, anxiety, depression.    Admit 2/2: Etoh intoxication, concern for withdrawal.      SUBJECTIVE INFORMATION  Assessed patient in room.    NUTRITION HISTORY  - Diet at home: Meals TID.  Did not verbalize etoh intake w/ this writer.  - Barriers to PO intakes: States she had no appetite and was not able to eat in the ~2 days PTA.  - Use of oral supplements: None.  - In conversation Jacquie shared with me that she used to be bulimic but has not struggled with this for a period of years.   - States she has been intentionally changing her diet and strength training for a period of years without concern.  - Allergies: Gluten.      CURRENT NUTRITION ORDERS AND INTAKE/TOLERANCE  Diet: Regular    Some documentation of nausea and abdominal discomfort acutely.  Limited timeframe of admission, 100% intakes thus far per flowsheets.    CONSULTS  - Psych and CD consults pending.    LABS  Nutrition-relevant labs: Reviewed.   - Electrolytes WNL except for magnesium which is low at 1.6  - Phosphorus has not been trended since 2/22     MEDICATIONS  Nutrition-relevant medications: Reviewed.   - 1 mg folic acid daily  - MVI/M  - 100 mg thiamine daily  - D5 IVFs discontinued yesterday, were running at 125 mL/hr    SYSTEM " "FINDINGS    - Skin: No current documentation of PI.   - GI symptoms: No recorded BM acutely.     ANTHROPOMETRICS  Height: 170.2 cm (5' 7\")  Most Recent Weight: 64.3 kg (141 lb 12.8 oz)  Body mass index is 22.21 kg/m .:   Normal BMI  Wt Readings from Last 10 Encounters:   02/22/25 64.3 kg (141 lb 12.8 oz)   02/03/25 59.8 kg (131 lb 13.4 oz)   11/08/24 64.9 kg (143 lb 1.3 oz)   08/02/24 70.6 kg (155 lb 11.2 oz)     - No edema documented.   - ?Wt gain when compared to trend earlier this month vs inaccuracy of 131#?  - Current wt consistent with that in 11/2024.   - Jacquie herself states she lost 50# over a period of 3 years but she feels at some point her wt loss dipped further, into the 130's.    ASSESSED NUTRITION NEEDS  Dosing Weight: 64 kg, based on admit/standing scale wt  Estimated Energy Needs: >/=25 kcal/kg  Justification: Maintenance  Estimated Protein Needs: >/=1 grams of pro/kg  Justification: Preservation of lean body mass  Estimated Fluid Needs: per provider    NUTRITION DIAGNOSIS  Predicted inadequate nutrient intake (energy/protein)  related to admit for etoh withdrawal, potential for decline in PO intakes acutely pending appetite, pain and LOS.    INTERVENTIONS  Collaboration by nutrition professional with other providers: Discussed POC with team during rounds.     Goals  PO intakes of least 75% meals or snacks TID.     Monitoring/Evaluation  Progress toward goals will be monitored and evaluated per policy.      Lisa Mejia RDN, , LD  Clinical Dietitian  [facility] Message Group: \"Dietitian [Tk]\"  Office: [phone]  Pagers:  3rd floor/ICU: [phone]  All other floors: [phone]  Weekend/holiday: [phone]    "

## 2025-02-24 NOTE — PROGRESS NOTES
[facility]    Medicine Progress Note - Hospitalist Service    Date of Admission:  2/22/2025    Assessment & Plan     Jacquie Henderson is a 34 year old female admitted on 2/22/2025. She presents to the emergency department after binging on alcohol, asking for help.  She was having tremors but otherwise is not exhibiting any signs of inebriation despite of blood alcohol level of 0.32.      Alcohol use disorder.  Alcohol intoxication.  Acute alcohol withdrawals.  -EtOH 0.32 at time of presentation.  -Continues having tremors.  -Decrease scheduled clonidine to 0.05 mg every 8 hours with hold parameters.  -Continue gabapentin taper protocol.  -Vitamins with folic acid, thiamine, multivitamin.  -[facility] protocol monitoring.  -Diazepam if needed.  -Chemical dependency consult.  -Stopped previous IV fluids.     Starvation ketosis.  -Ketones elevated at 1.62 at time of presentation.  -Has been on IV fluids with dextrose.  -Now tolerating diet.  -Stop IV fluids.  -Encouraged continued good oral intake.  -Ketosis now resolved.     Anxiety.  Depression.  -Continue sertraline 25 mg daily.  -Psychiatry consult appreciated.     Lactic acidosis.  -Likely due to alcohol intoxication and dehydration.  -Resolved.     Hypocalcemia.  -Continue calcium carbonate 500 mg twice a day.     Anemia.  Leukopenia.  -Stable.  -Recheck CBC intermittently.     Near syncopal events.    Previous palpitations.  -Echocardiogram on 2/23 unremarkable.  -Has previously been on event monitor in the outpatient setting.  -consider cardiology referral in the outpatient setting at time of discharge.    Left upper quadrant abdominal pain.  -Previous lipase level normal.  -Check abdominal x-ray.          Diet: Combination Diet Regular Diet    DVT Prophylaxis: Pneumatic Compression Devices  Lamb Catheter: Not present  Lines: None     Cardiac Monitoring: ACTIVE order. Indication: Tachyarrhythmias, acute (48 hours)  Code Status: Full Code       Clinically Significant Risk Factors          # Hypochloremia: Lowest Cl = 93 mmol/L in last 2 days, will monitor as appropriate  # Hypocalcemia: Lowest Ca = 8.2 mg/dL in last 2 days, will monitor and replace as appropriate   # Hypomagnesemia: Lowest Mg = 1.6 mg/dL in last 2 days, will replace as needed  # Anion Gap Metabolic Acidosis: Highest Anion Gap = 28 mmol/L in last 2 days, will monitor and treat as appropriate                            Social Drivers of Health            Disposition Plan     Medically Ready for Discharge: Anticipated Tomorrow             Paul Ramirez DO  Hospitalist Service  [facility]  Securely message with [employer] (more info)  Text page via [facility] Paging/Directory   ______________________________________________________________________    Interval History   Continues to feel shaky.  Nausea at times.  Some abdominal pain in the left upper quadrant.  Denies chest pain, shortness of breath, fevers, chills.    Physical Exam   Vital Signs: Temp: 98.1  F (36.7  C) Temp src: Oral BP: 116/72 Pulse: 70   Resp: 18 SpO2: 98 % O2 Device: None (Room air)    Weight: 141 lbs 12.8 oz    Gen:  NAD, A&Ox3.  Eyes:  PERRL, sclera anicteric.  OP:  MMM, no lesions.  Neck:  Supple.  CV:  Regular, no murmurs.  Lung:  CTA b/l, normal effort.  Ab:  +BS, soft.  Skin:  Warm, dry to touch.  No rash.  Ext:  No pitting edema LE b/l.      Medical Decision Making       39 MINUTES SPENT BY ME on the date of service doing chart review, history, exam, documentation & further activities per the note.      Data     I have personally reviewed the following data over the past 24 hrs:    3.6 (L)  \   12.8   / 186     136 99 7.5 /  106 (H)   4.3 29 0.67 \     ALT: 35 AST: 41 AP: 71 TBILI: 0.6   ALB: 4.1 TOT PROTEIN: 6.8 LIPASE: N/A     Procal: N/A CRP: N/A Lactic Acid: 1.0         Imaging results reviewed over the past 24 hrs:   No results found for this or any previous visit (from the past 24  hours).

## 2025-02-24 NOTE — PLAN OF CARE
"Pt A&Ox4.  SBA in room.  C/o headache, PRN tylenol given.  On RA.  Scoring on CIWAs, valium 10mg PO given x2.  PRN atarax given for anxiety x1. K/mag redraw in AM.      Goal Outcome Evaluation:      Plan of Care Reviewed With: patient    Overall Patient Progress: improvingOverall Patient Progress: improving    Outcome Evaluation: Scoring on CIWAs, valium given.      Problem: Adult Inpatient Plan of Care  Goal: Plan of Care Review  Description: The Plan of Care Review/Shift note should be completed every shift.  The Outcome Evaluation is a brief statement about your assessment that the patient is improving, declining, or no change.  This information will be displayed automatically on your shift  note.  Outcome: Progressing  Flowsheets (Taken 2/23/2025 1859)  Outcome Evaluation: Scoring on CIWAs, valium given.  Plan of Care Reviewed With: patient  Overall Patient Progress: improving  Goal: Patient-Specific Goal (Individualized)  Description: You can add care plan individualizations to a care plan. Examples of Individualization might be:  \"Parent requests to be called daily at 9am for status\", \"I have a hard time hearing out of my right ear\", or \"Do not touch me to wake me up as it startles  me\".  Outcome: Progressing  Goal: Absence of Hospital-Acquired Illness or Injury  Outcome: Progressing  Intervention: Identify and Manage Fall Risk  Recent Flowsheet Documentation  Taken 2/23/2025 1714 by Lucille Buitrago, RN  Safety Promotion/Fall Prevention:   activity supervised   nonskid shoes/slippers when out of bed   safety round/check completed   supervised activity  Goal: Optimal Comfort and Wellbeing  Outcome: Progressing  Intervention: Monitor Pain and Promote Comfort  Recent Flowsheet Documentation  Taken 2/23/2025 1716 by Lucille Buitrago, RN  Pain Management Interventions: medication (see MAR)  Goal: Readiness for Transition of Care  Outcome: Progressing     Problem: Skin Injury Risk Increased  Goal: " Skin Health and Integrity  Outcome: Progressing  Intervention: Optimize Skin Protection  Recent Flowsheet Documentation  Taken 2/23/2025 1714 by Lucille Buitrago, RN  Activity Management:   ambulated to bathroom   back to bed     Problem: Alcohol Withdrawal  Goal: Alcohol Withdrawal Symptom Control  Outcome: Progressing  Goal: Optimal Neurologic Function  Outcome: Progressing  Goal: Readiness for Change Identified  Outcome: Progressing

## 2025-02-24 NOTE — CONSULTS
Initial Psychiatric Consult   Consult date: 2025         Reason for Consult, requesting source:    Severe depression    Requesting source: Chris Dominguez    Labs and imaging reviewed. [place] consultation with Dr. Ramirez. Spoke with RN, Bree regarding patient.         HPI:   Jacquie Henderson is a 34 year old female with a hx notable for depression and alcohol use disorder who was admitted to medical on 2025 voluntarily seeking additional supports and withdrawal management. BAL noted to be 0.32 upon arrival. CIWA has been initiated with symptom triggered valium. Record review indicates patient was seen in the ED on 2/3/2025 with concerns for alcohol withdrawal and additionally on 2024. Record review indicates sertraline was started on 25 following patient request. Psychiatry asked to consult given concerns for severe depression.     Patient was sitting in bed when I met with her today. She tells me she came to the hospital seeking help following ongoing alcohol use. She tells me her alcohol use is largely due to unresolved grief. Today, she feels much better since coming to the hospital, her withdrawal symptoms have improved. She becomes tearful when discussing that this is the 7 year anniversary of her mother's death and that she still thinks about this daily. She does not wish to elaborate but shares that she found her mother  and continues to reflect on this. She endorses feeling hopeless, down, and at times ruminating thoughts surrounding this loss. She denies SI/HI and denies any symptoms of psychosis. Her appetite and sleep vary however she also shares alcohol use plays a role in this. She estimates she drinks up to 6-7 drinks per day.     She recalls a previous mental health hx of depression, anxiety, and trauma. She recalls a brief medication trial of sertraline 2-3 years ago for 2-3 weeks but stopped this after she felt as though her symptoms improved. She denies  any other trials aside from antabuse. She has seen a therapist in the past however has not seen them recently as they are not covered by insurance she she cannot afford the out of pocket expense.    Given that she tolerated sertraline in the past and perceived it to beneficial, despite a very brief trial, she would like to continue this. Record review indicates this was started by the medical team yesterday following patient's request. She would like to continue at the current dose to established tolerability prior to increasing this further noting no prior medication trials. She is agreeable to referrals for psych med management and therapy, ideally focusing on grief.    Discussed the role of ongoing alcohol use and how this can mask and or exacerbate mental health symptoms. She acknowledges this but would prefer to focus on obtaining additional grief supports prior to participating in more intensive alcohol use disorder supports. Record review indicates patient has previously completed a JATIN consult 8/2024 in which IOP was recommended. Patient reports she did not attend IOP and would prefer to focus on grief supports.         Past Psychiatric History:     Past Diagnoses: depression, anxiety, trauma   Medication Trials: sertraline- reports 2-3 week trial, Antabuse, gabapentin   Past/Current Providers: none, has previously worked with PCP  Psychiatric Hospitalizations: reports 1 prior admission for alcohol withdrawal and 2 ED visits for alcohol related concerns, denies mental health IP admissions   Suicide Attempts: denies   Self Injury: denies   History of Trauma: reports the death of her mother and finding her mother decreased 7 year ago to be traumatic   Past Psychosis: denies   Past Leslye: denies   Outpatient Programs: denies   Civil Commitment: denies   ECT: denies         Substance Use and History:   Patient reports ongoing alcohol use, estimates 6-7 drinks per day. BAL 0.32 upon arrival to the ED. Patient  reports past hospitalization for alcohol withdrawal, denies previous withdrawal seizures. Record review indicates previous JATIN treatment through [facility] in 20[facility]. JATIN assessment from 8/2024 reviewed noting dx of alcohol use disorder, moderate with recommendations for patient to complete IOP.         Past Medical History:   PAST MEDICAL HISTORY: No past medical history on file.    PAST SURGICAL HISTORY: No past surgical history on file.          Family History:   FAMILY HISTORY: No family history on file.        Social History:   SOCIAL HISTORY:   Social History     Tobacco Use    Smoking status: Not on file    Smokeless tobacco: Not on file   Substance Use Topics    Alcohol use: Not on file     Single. Patient reports she has one younger brother, 3 years younger, who she is close with. 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 Lives alone. Identifies friends as primary supports.          Physical ROS:   The 10 point Review of Systems is negative other than noted in the HPI or here.           Medications:     Current Facility-Administered Medications   Medication Dose Route Frequency Provider Last Rate Last Admin    calcium carbonate 500 mg (elemental) (OSCAL) tablet 500 mg  500 mg Oral BID w/meals Paul Ramirez DO   500 mg at 02/23/25 1716    cloNIDine (CATAPRES) tablet 0.1 mg  0.1 mg Oral Q8H Chris Dominguez MD   0.1 mg at 02/24/25 0156    famotidine (PEPCID) tablet 20 mg  20 mg Oral BID Paul Ramirez DO   20 mg at 02/23/25 2215    folic acid (FOLVITE) tablet 1 mg  1 mg Oral Daily Mehrdad Cmaacho MD   1 mg at 02/23/25 0906    folic acid injection 1 mg  1 mg Intravenous Daily Chris Dominguez MD        [START ON 3/2/2025] gabapentin (NEURONTIN) capsule 100 mg  100 mg Oral Q8H Chris Dominguez MD        [START ON 2/28/2025] gabapentin (NEURONTIN) capsule 300 mg  300 mg Oral Q8H Chris Dominguez MD        [START ON 2/26/2025] gabapentin (NEURONTIN) capsule 600 mg  600  mg Oral Q8H Chris Dominguez MD        gabapentin (NEURONTIN) capsule 900 mg  900 mg Oral Q8H Chris Dominguez MD   900 mg at 02/24/25 0151    magnesium oxide (MAG-OX) tablet 400 mg  400 mg Oral Q4H Chris Dominguez MD        multivitamin w/minerals (THERA-VIT-M) tablet 1 tablet  1 tablet Oral Daily Chris Dominguez MD   1 tablet at 02/23/25 0906    sertraline (ZOLOFT) tablet 25 mg  25 mg Oral Daily Paul Ramirez DO   25 mg at 02/23/25 1046    sodium chloride (PF) 0.9% PF flush 3 mL  3 mL Intracatheter Q8H Chris Dominguez MD   3 mL at 02/24/25 0156    thiamine (B-1) injection 100 mg  100 mg Intravenous Daily Chris Dominguez MD   100 mg at 02/23/25 0905              Allergies:     Allergies   Allergen Reactions    Gluten Meal GI Disturbance          Labs:     Recent Results (from the past 48 hours)   EKG 12-lead, tracing only    Collection Time: 02/22/25  1:52 PM   Result Value Ref Range    Systolic Blood Pressure  mmHg    Diastolic Blood Pressure  mmHg    Ventricular Rate 114 BPM    Atrial Rate 114 BPM    NM Interval 156 ms    QRS Duration 88 ms     ms    QTc 487 ms    P Axis 62 degrees    R AXIS 55 degrees    T Axis 37 degrees    Interpretation ECG       Sinus tachycardia  Possible Left atrial enlargement  QTcB >= 480 msec  Abnormal ECG  When compared with ECG of 03-Feb-2025 18:54,  No significant change was found  Confirmed by - EMERGENCY ROOM, PHYSICIAN ([id number]),  ANNITA HENDRICKS (Denny) on 2/24/2025 6:53:26 AM     Troponin T, High Sensitivity    Collection Time: 02/22/25  2:23 PM   Result Value Ref Range    Troponin T, High Sensitivity <6 <=14 ng/L   Comprehensive metabolic panel    Collection Time: 02/22/25  2:23 PM   Result Value Ref Range    Sodium 140 135 - 145 mmol/L    Potassium 4.0 3.4 - 5.3 mmol/L    Carbon Dioxide (CO2) 19 (L) 22 - 29 mmol/L    Anion Gap 28 (H) 7 - 15 mmol/L    Urea Nitrogen 10.1 6.0 - 20.0 mg/dL    Creatinine 0.63 0.51 - 0.95 mg/dL    GFR Estimate >90 >60  mL/min/1.73m2    Calcium 9.0 8.8 - 10.4 mg/dL    Chloride 93 (L) 98 - 107 mmol/L    Glucose 80 70 - 99 mg/dL    Alkaline Phosphatase 64 40 - 150 U/L    AST 55 (H) 0 - 45 U/L    ALT 39 0 - 50 U/L    Protein Total 8.3 6.4 - 8.3 g/dL    Albumin 4.8 3.5 - 5.2 g/dL    Bilirubin Total 0.6 <=1.2 mg/dL   Lipase    Collection Time: 02/22/25  2:23 PM   Result Value Ref Range    Lipase 26 13 - 60 U/L   Ketone Beta-Hydroxybutyrate Quantitative    Collection Time: 02/22/25  2:23 PM   Result Value Ref Range    Ketone (Beta-Hydroxybutyrate) Quantitative 1.62 (HH) <=0.30 mmol/L   Alcohol level blood    Collection Time: 02/22/25  2:23 PM   Result Value Ref Range    Alcohol ethyl 0.32 (HH) <=0.01 g/dL   HCG QUALitative pregnancy (blood)    Collection Time: 02/22/25  2:23 PM   Result Value Ref Range    hCG Serum Qualitative Negative Negative   Magnesium    Collection Time: 02/22/25  2:23 PM   Result Value Ref Range    Magnesium 1.7 1.7 - 2.3 mg/dL   CBC with platelets and differential    Collection Time: 02/22/25  2:23 PM   Result Value Ref Range    WBC Count 4.4 4.0 - 11.0 10e3/uL    RBC Count 4.74 3.80 - 5.20 10e6/uL    Hemoglobin 14.4 11.7 - 15.7 g/dL    Hematocrit 41.9 35.0 - 47.0 %    MCV 88 78 - 100 fL    MCH 30.4 26.5 - 33.0 pg    MCHC 34.4 31.5 - 36.5 g/dL    RDW 12.3 10.0 - 15.0 %    Platelet Count 334 150 - 450 10e3/uL    % Neutrophils 61 %    % Lymphocytes 32 %    % Monocytes 5 %    % Eosinophils 0 %    % Basophils 2 %    % Immature Granulocytes 0 %    NRBCs per 100 WBC 0 <1 /100    Absolute Neutrophils 2.7 1.6 - 8.3 10e3/uL    Absolute Lymphocytes 1.4 0.8 - 5.3 10e3/uL    Absolute Monocytes 0.2 0.0 - 1.3 10e3/uL    Absolute Eosinophils 0.0 0.0 - 0.7 10e3/uL    Absolute Basophils 0.1 0.0 - 0.2 10e3/uL    Absolute Immature Granulocytes 0.0 <=0.4 10e3/uL    Absolute NRBCs 0.0 10e3/uL   Extra Blue Top Tube    Collection Time: 02/22/25  2:23 PM   Result Value Ref Range    Hold Specimen JIC    Magnesium    Collection Time:  02/22/25  2:23 PM   Result Value Ref Range    Magnesium 1.8 1.7 - 2.3 mg/dL   Phosphorus    Collection Time: 02/22/25  2:23 PM   Result Value Ref Range    Phosphorus 3.6 2.5 - 4.5 mg/dL   Lactic acid whole blood    Collection Time: 02/22/25  6:06 PM   Result Value Ref Range    Lactic Acid 4.2 (HH) 0.7 - 2.0 mmol/L   Comprehensive metabolic panel    Collection Time: 02/23/25  5:07 AM   Result Value Ref Range    Sodium 138 135 - 145 mmol/L    Potassium 4.6 3.4 - 5.3 mmol/L    Carbon Dioxide (CO2) 30 (H) 22 - 29 mmol/L    Anion Gap 9 7 - 15 mmol/L    Urea Nitrogen 8.4 6.0 - 20.0 mg/dL    Creatinine 0.67 0.51 - 0.95 mg/dL    GFR Estimate >90 >60 mL/min/1.73m2    Calcium 8.2 (L) 8.8 - 10.4 mg/dL    Chloride 99 98 - 107 mmol/L    Glucose 129 (H) 70 - 99 mg/dL    Alkaline Phosphatase 50 40 - 150 U/L    AST 40 0 - 45 U/L    ALT 28 0 - 50 U/L    Protein Total 6.0 (L) 6.4 - 8.3 g/dL    Albumin 3.5 3.5 - 5.2 g/dL    Bilirubin Total 0.9 <=1.2 mg/dL   Magnesium    Collection Time: 02/23/25  5:07 AM   Result Value Ref Range    Magnesium 2.1 1.7 - 2.3 mg/dL   CBC with platelets and differential    Collection Time: 02/23/25  5:07 AM   Result Value Ref Range    WBC Count 3.4 (L) 4.0 - 11.0 10e3/uL    RBC Count 3.86 3.80 - 5.20 10e6/uL    Hemoglobin 11.6 (L) 11.7 - 15.7 g/dL    Hematocrit 34.4 (L) 35.0 - 47.0 %    MCV 89 78 - 100 fL    MCH 30.1 26.5 - 33.0 pg    MCHC 33.7 31.5 - 36.5 g/dL    RDW 12.1 10.0 - 15.0 %    Platelet Count 217 150 - 450 10e3/uL    % Neutrophils 49 %    % Lymphocytes 37 %    % Monocytes 12 %    % Eosinophils 1 %    % Basophils 1 %    % Immature Granulocytes 0 %    NRBCs per 100 WBC 0 <1 /100    Absolute Neutrophils 1.7 1.6 - 8.3 10e3/uL    Absolute Lymphocytes 1.2 0.8 - 5.3 10e3/uL    Absolute Monocytes 0.4 0.0 - 1.3 10e3/uL    Absolute Eosinophils 0.0 0.0 - 0.7 10e3/uL    Absolute Basophils 0.0 0.0 - 0.2 10e3/uL    Absolute Immature Granulocytes 0.0 <=0.4 10e3/uL    Absolute NRBCs 0.0 10e3/uL  "  Echocardiogram Complete    Collection Time: 02/23/25 12:44 PM   Result Value Ref Range    LVEF  60-65%    Glucose by meter    Collection Time: 02/24/25  2:49 AM   Result Value Ref Range    GLUCOSE BY METER POCT 134 (H) 70 - 99 mg/dL   Magnesium    Collection Time: 02/24/25  6:06 AM   Result Value Ref Range    Magnesium 1.6 (L) 1.7 - 2.3 mg/dL   Comprehensive metabolic panel    Collection Time: 02/24/25  6:06 AM   Result Value Ref Range    Sodium 136 135 - 145 mmol/L    Potassium 4.3 3.4 - 5.3 mmol/L    Carbon Dioxide (CO2) 29 22 - 29 mmol/L    Anion Gap 8 7 - 15 mmol/L    Urea Nitrogen 7.5 6.0 - 20.0 mg/dL    Creatinine 0.67 0.51 - 0.95 mg/dL    GFR Estimate >90 >60 mL/min/1.73m2    Calcium 9.5 8.8 - 10.4 mg/dL    Chloride 99 98 - 107 mmol/L    Glucose 106 (H) 70 - 99 mg/dL    Alkaline Phosphatase 71 40 - 150 U/L    AST 41 0 - 45 U/L    ALT 35 0 - 50 U/L    Protein Total 6.8 6.4 - 8.3 g/dL    Albumin 4.1 3.5 - 5.2 g/dL    Bilirubin Total 0.6 <=1.2 mg/dL   CBC with platelets    Collection Time: 02/24/25  6:06 AM   Result Value Ref Range    WBC Count 3.6 (L) 4.0 - 11.0 10e3/uL    RBC Count 4.20 3.80 - 5.20 10e6/uL    Hemoglobin 12.8 11.7 - 15.7 g/dL    Hematocrit 38.2 35.0 - 47.0 %    MCV 91 78 - 100 fL    MCH 30.5 26.5 - 33.0 pg    MCHC 33.5 31.5 - 36.5 g/dL    RDW 12.2 10.0 - 15.0 %    Platelet Count 186 150 - 450 10e3/uL   Lactic acid whole blood    Collection Time: 02/24/25  6:06 AM   Result Value Ref Range    Lactic Acid 1.0 0.7 - 2.0 mmol/L   Ketone Beta-Hydroxybutyrate Quantitative,Rapid    Collection Time: 02/24/25  6:06 AM   Result Value Ref Range    Ketone (Beta-Hydroxybutyrate) Quantitative <0.18 <=0.30 mmol/L   Phosphorus    Collection Time: 02/24/25  6:06 AM   Result Value Ref Range    Phosphorus 2.9 2.5 - 4.5 mg/dL          Physical and Psychiatric Examination:     /81 (BP Location: Left arm)   Pulse 78   Temp 98.1  F (36.7  C) (Oral)   Resp 18   Ht 1.702 m (5' 7\")   Wt 64.3 kg (141 lb 12.8 " oz)   LMP 01/07/2025 (Approximate)   SpO2 98%   Breastfeeding No   BMI 22.21 kg/m    Weight is 141 lbs 12.8 oz  Body mass index is 22.21 kg/m .    Physical Exam:  I have reviewed the physical exam as documented by by the medical team and agree with findings and assessment and have no additional findings to add at this time.    Mental Status Exam:    Appearance: awake, alert, adequately groomed, dressed in hospital scrubs, and appeared as age stated  Attitude:  cooperative  Eye Contact:  good  Mood:  depressed  Affect:  appropriate and in normal range and mood congruent  Speech:  clear, coherent and normal prosody  Psychomotor Behavior:  no evidence of tardive dyskinesia, dystonia, or tics  Thought Process:  logical and linear  Associations:  no loose associations  Thought Content:  no evidence of suicidal ideation or homicidal ideation and no evidence of psychotic thought  Insight:  fair  Judgement:  fair  Oriented to:  time, person, and place  Attention Span and Concentration:  intact  Recent and Remote Memory:  intact  Gait and Station: not observed, patient seated throughout assessment                DSM-5 Diagnosis:   296.32 (F33.1) Major Depressive Disorder, Recurrent Episode, Moderate _  309.81 (F43.10) Posttraumatic Stress Disorder (includes Posttraumatic Stress Disorder for Children 6 Years and Younger)  Without dissociative symptoms  Substance-Related & Addictive Disorders Alcohol Use Disorder   303.90 (F10.20) Moderate In a controlled environment  Alcohol withdrawal           Assessment:   Jacquie Henderson is a 34 year old female with a hx notable for depression, anxiety,  PTSD, and alcohol use disorder who was admitted to medical on 2/22/2025 voluntarily seeking additional supports and withdrawal management. BAL noted to be 0.32 upon arrival. CIWA has been initiated with symptom triggered valium. Record review indicates patient was seen in the ED on 2/3/2025 with concerns for alcohol withdrawal and  additionally on 11/8/2024. Record review indicates sertraline was started on 2/23/25 following patient request. Psychiatry asked to consult given concerns for severe depression.     Today, patient endorses ongoing symptoms of depression including hopelessness, ruminating thoughts, and tearfulness. Symptoms are further exacerbated by ongoing alcohol use and unresolved grief. Patient reports the death of her mother 7 years ago to be a primary trigger for ongoing alcohol use. Patient denies SI/HI and there's no evidence of psychosis. Current symptoms are consistent with depression, trauma, and ongoing alcohol use disorder. She recalls past perceived benefit to sertraline medication trial, of note this was a short trial of 2-3 weeks. Setraline has been resumed at 25mg per patient request. Discussed considerations for additional dose optimization, patient would like to defer to OP provider and continue to establish tolerability. Patient would benefit from referrals for OP psych med management and therapy which she is agreeable to. Patient would benefit from additional JATIN supports however notes she believes alcohol use is due to unresolved grief thus would prefer to focus on this prior to pursuing mote intensive JATIN supports. At time of assessment patient does not meet criteria for IP mental health admission and can discharge with additional OP supports once medically cleared.           Summary of Recommendations:   Continue sertraline 25mg daily targeting depression, follow-up with OP provider for additional dose optimization  Patient would benefit from referrals for psych med management and therapy, patient requested grief focus- see AVS for appointment details   Recommendation to abstain from alcohol use.   Patient would benefit from additional JATIN supports however reports she is only open to community resources a this time noting she would prefer to focus on grief and mental health supports prior to JATIN supports.    At time of assessment patient does not warrant inpatient mental health admission and can be further stabilized with OP supports. Patient psychiatrically cleared for discharge once medically cleared.   Please re consult psychiatry as needed      YOLI Meeks CNP    Consult/Liaison Psychiatry   [facility]    Please call the [facility] CL line ([phone]) with questions and to determine consult service coverage.

## 2025-02-24 NOTE — DISCHARGE INSTRUCTIONS
MENTAL HEALTH RESOURCES & SERVICES:   Behavioral Healthcare Providers Scheduling  During your hospitalization, you were seen by a licensed mental health professional [facility]  for a brief mental health assessment and/or psychotherapy at [facility] a [facility] of [facility].  It is recommended that you follow up with your established providers (psychiatrist, mental health therapist, and/or primary care doctor - as relevant) as soon as possible. Coordinators from [facility] will be calling you in the next 24-48 hours to ensure that you have the resources you need.  You can also contact [facility] coordinators directly at [phone].    You have been scheduled for the following mental health appointments:   Date: Wednesday, 3/5/2025  Time: 1:00 pm - 2:00 pm  Provider: Deedee Ferguson MS  LMFT  Location: [facility], [address] [place] [id number]  Phone: [phone]  Type: Therapy - Initial (In-Person)    Patient instructions:  To reduce no shows, we ask that patients call our office at [phone] and confirm their appointment and leave their email. We make effort to reach each client, but if we cannot reach them or they do not confirm appointment, the appointment will be removed. Please ask patients to leave a voicemail with their information.    Date: Thursday, 3/6/2025  Time: 12:00 pm - 1:00 pm  Provider: Mirtha Ruelas DNP, CNP,CESARP,RN  Location: [facility], [address], [address], [place] [address]  Phone: [phone]  Type: Medication Mgmt - Initial (In-Person)    Patient instructions:  1. ALL INITIAL APPOINTMENTS ARE IN PERSON. 2. Must speak/understand English no  service. 3. Must bring insurance cards, photo ID & List of medications with dosages. 4. Patients with guardians must have guardian attend initial appointment.      [facility] maintains an extensive network of licensed behavioral health providers to connect patients with the services they need.  We do not charge providers a fee to participate in our referral network.  We match patients with providers based on a patient s specific needs, insurance coverage, and location.  Our first effort will be to refer you to a provider within your care system, and will utilize providers outside your care system as needed.              CHEMICAL HEALTH RESOURCES:  The first step to accessing chemical health treatment is to complete a Chemical Health Assessment.  Chemical Health Assessments are beneficial as the  can help you find a treatment program that is a good fit for your goals and needs. The  will make the referrals for you and help follow up until you get into a program.  To schedule an Assessment with [facility], call Behavioral Access at [phone].     [facility] program offers access to chemical health services (individual counseling, group counseling) and peer recovery support services virtually.  For more information and to schedule, call: [phone].      To find recovery support meetings near you or online:  AA: https://[place].org/meetings/ or call [phone]  NA: https://[place].org/find-a-meeting/ or call 1-798.406.4242  SMART Recovery: https://meetings.smartrecovery.org/meetings/  [facility] Recovery: https://refugerecoverymeetings.org/meetings  Celebrate Recovery: https://www.[employer]/what-we-offer/find-a-cr-meeting  [place] Recovery Connection: https://[facility].[email or web address]/ or call [phone] (All Recovery Meetings & Telephone Recovery Support)  [facility]: https://recoverydharma.org/meetings/    Ways to help cope with sobriety:   Take prescribed medicines as scheduled   Keep follow-up appointments   Talk to others about your concerns   Get regular exercise   Practice deep breathing skills   Eat  a healthy diet   Use community resources, including hotline numbers, [facility] crisis and support meetings   Stay sober and avoid places/people/things associated with substance use   Maintain a daily schedule/routine   Get at least 7-8 hours of sleep per night   Create a list 10--20 healthy activities that you can do that are enjoyable and do not involve substance use   Create daily goals (approx. 1-4 goals) per day and work to achieve them throughout the day    [place] Recovery Connection ([facility]): [facility] connects people seeking recovery to resources that help foster and sustain long-term recovery. Whether you are seeking resources for treatment, transportation, housing, job training, education, health care or other pathways to recovery, [facility] is a great place to start. Phone: 601.374.8252. www.[facility].org (Great listing of all types of recovery and non-recovery related resources).    [place] [facility]  [address] [place] [address]  Phone: [phone]  E-mail: [email or web address]  Website: https://[email or web address]/

## 2025-02-24 NOTE — CONSULTS
Met with pt for CD Consult and introduced self and role in pt's care.  Inquired about pt's interest in JATIN Assessment for referrals to Tx or any additional community resources.  Discussed levels of care of JATIN Tx and process of completing JATIN Assessment for referrals, also discussed virtual Tx options with pt, pt declined to complete JATIN Assessment while admitted but was open to receiving community CD resources, sending to pt via secure e-mail to [email or web address] and adding to pt's AVS.    Relayed to pt that if they change their mind while admitted and would like to complete a JATIN Assessment for referrals to treatment or need any additional CD Resources they may alert unit staff who will assist in having CD Consult re-ordered.  If pt has active insurance they may also schedule an assessment on an outpatient basis by calling [facility] at [phone].    TRISTAN Zimmer, [facility]  Substance Use Disorder Evaluation Counselor  Email: [email or web address]

## 2025-02-24 NOTE — PLAN OF CARE
"Goal Outcome Evaluation:      Plan of Care Reviewed With: patient    Overall Patient Progress: improving    Outcome Evaluation:     Alert and oriented x 4  .On  RA .  VSS . Afebrile . Resting tremor noted but no  abnormal behavior, or seizure activity was observed. Reported mild nausea and epigastric discomfort got relief after having snacks and apple juice . Tylenol given for headache . Last Diazepam 10 mg po given at 0237 given and the last CIWA score :  3 . Abdomen is   soft , nontender, & +bowel sounds.      Problem: Adult Inpatient Plan of Care  Goal: Plan of Care Review  Description: The Plan of Care Review/Shift note should be completed every shift.  The Outcome Evaluation is a brief statement about your assessment that the patient is improving, declining, or no change.  This information will be displayed automatically on your shift  note.  Outcome: Progressing  Flowsheets (Taken 2/24/2025 0350)  Outcome Evaluation: vs  Plan of Care Reviewed With: patient  Overall Patient Progress: improving  Goal: Patient-Specific Goal (Individualized)  Description: You can add care plan individualizations to a care plan. Examples of Individualization might be:  \"Parent requests to be called daily at 9am for status\", \"I have a hard time hearing out of my right ear\", or \"Do not touch me to wake me up as it startles  me\".  Outcome: Progressing  Goal: Absence of Hospital-Acquired Illness or Injury  Outcome: Progressing  Intervention: Identify and Manage Fall Risk  Recent Flowsheet Documentation  Taken 2/24/2025 0155 by Jocelyn Santizo, RN  Safety Promotion/Fall Prevention:   safety round/check completed   clutter free environment maintained   lighting adjusted   nonskid shoes/slippers when out of bed   patient and family education   room near nurse's station  Taken 2/24/2025 0150 by Jocelyn Santizo, RN  Safety Promotion/Fall Prevention: safety round/check completed  Taken 2/23/2025 2320 by Jocelyn Santizo, RN  Safety " Promotion/Fall Prevention: safety round/check completed  Intervention: Prevent Skin Injury  Recent Flowsheet Documentation  Taken 2/24/2025 0155 by Jocelyn Santizo RN  Body Position: position changed independently  Intervention: Prevent Infection  Recent Flowsheet Documentation  Taken 2/24/2025 0155 by Jocelyn Santizo RN  Infection Prevention:   cohorting utilized   hand hygiene promoted   rest/sleep promoted   single patient room provided  Goal: Optimal Comfort and Wellbeing  Outcome: Progressing  Intervention: Monitor Pain and Promote Comfort  Recent Flowsheet Documentation  Taken 2/24/2025 0324 by Jocelyn Santizo, RN  Pain Management Interventions: medication (see MAR)  Taken 2/24/2025 0236 by Jocelyn Santizo RN  Pain Management Interventions: medication (see MAR)  Taken 2/24/2025 0151 by Jocelyn Santizo RN  Pain Management Interventions: medication (see MAR)  Goal: Readiness for Transition of Care  Outcome: Progressing

## 2025-02-24 NOTE — CONSULTS
Care Management Discharge Note    Discharge Date: 02/25/2025       Discharge Disposition:  Home    Discharge Services:  OP CD/MH resources    Discharge DME:  None    Private pay costs discussed: Not applicable    Does the patient's insurance plan have a 3 day qualifying hospital stay waiver?  No      Handoff Referral Completed: No, handoff not indicated or clinically appropriate    Additional Information:  Sw aware of consult for CD resources. Pt was seen by the CD team today and provided resources. Pt was also seen by Psych.  No additional Sw needs at this time.      KAI Mike, [facility]  Inpatient Care Coordination  [facility]  [phone]

## 2025-02-24 NOTE — PLAN OF CARE
"Pertinent assessments: A&Ox4. VSS. On RA. Denied pain. Anxious, has tremors. CIWA scores of 10, 5. 5. & 13. PRN Valium given x2. Independent with cares. On regular diet & tolerating well. Chest x-ray done. On Regular diet & tolerating well.     Major Shift Events: PRN Valium given x1, Chest x-ray. Mag replaced. Next recheck in AM     Treatment Plan: CIWA, Chem dep & Pschy following     Goal Outcome Evaluation:  Plan of Care Reviewed With: patient  Overall Patient Progress: improvingOverall Patient Progress: improving  Outcome Evaluation: VSS. On RA. CIWA scores ranging fron 5 to 13. PRN Valium given  Problem: Adult Inpatient Plan of Care  Goal: Plan of Care Review  Description: The Plan of Care Review/Shift note should be completed every shift.  The Outcome Evaluation is a brief statement about your assessment that the patient is improving, declining, or no change.  This information will be displayed automatically on your shift  note.  Outcome: Progressing  Flowsheets (Taken 2/24/2025 1425)  Outcome Evaluation: VSS. On RA. CIWA scores ranging fron 5 to 13. PRN Valium given  Plan of Care Reviewed With: patient  Overall Patient Progress: improving  Goal: Patient-Specific Goal (Individualized)  Description: You can add care plan individualizations to a care plan. Examples of Individualization might be:  \"Parent requests to be called daily at 9am for status\", \"I have a hard time hearing out of my right ear\", or \"Do not touch me to wake me up as it startles  me\".  Outcome: Progressing  Goal: Absence of Hospital-Acquired Illness or Injury  Outcome: Progressing  Intervention: Identify and Manage Fall Risk  Recent Flowsheet Documentation  Taken 2/24/2025 0930 by Bree Andre, RN  Safety Promotion/Fall Prevention:   activity supervised   assistive device/personal items within reach  Intervention: Prevent Skin Injury  Recent Flowsheet Documentation  Taken 2/24/2025 0919 by Bree Andre, RN  Body Position: position changed " independently  Intervention: Prevent and Manage VTE (Venous Thromboembolism) Risk  Recent Flowsheet Documentation  Taken 2/24/2025 0930 by Bree Andre RN  VTE Prevention/Management: compression stockings off  Goal: Optimal Comfort and Wellbeing  Outcome: Progressing  Intervention: Monitor Pain and Promote Comfort  Recent Flowsheet Documentation  Taken 2/24/2025 0919 by Bree Andre RN  Pain Management Interventions: medication (see MAR)  Goal: Readiness for Transition of Care  Outcome: Progressing     Problem: Skin Injury Risk Increased  Goal: Skin Health and Integrity  Outcome: Progressing  Intervention: Plan: Nurse Driven Intervention: Moisture Management  Recent Flowsheet Documentation  Taken 2/24/2025 0930 by Bree Andre RN  Moisture Interventions: Encourage regular toileting  Taken 2/24/2025 0800 by Bree Andre RN  Moisture Interventions: Encourage regular toileting  Bathing/Skin Care: other (see comments)  Intervention: Optimize Skin Protection  Recent Flowsheet Documentation  Taken 2/24/2025 0919 by Bree Andre RN  Activity Management:   activity adjusted per tolerance   activity encouraged   activity minimized     Problem: Alcohol Withdrawal  Goal: Alcohol Withdrawal Symptom Control  Outcome: Progressing  Goal: Optimal Neurologic Function  Outcome: Progressing  Goal: Readiness for Change Identified  Outcome: Progressing

## 2025-02-25 LAB
HOLD SPECIMEN: NORMAL
MAGNESIUM SERPL-MCNC: 1.7 MG/DL (ref 1.7–2.3)
POTASSIUM SERPL-SCNC: 4.2 MMOL/L (ref 3.4–5.3)

## 2025-02-25 PROCEDURE — 99232 SBSQ HOSP IP/OBS MODERATE 35: CPT | Performed by: INTERNAL MEDICINE

## 2025-02-25 PROCEDURE — 84132 ASSAY OF SERUM POTASSIUM: CPT | Performed by: HOSPITALIST

## 2025-02-25 PROCEDURE — 250N000013 HC RX MED GY IP 250 OP 250 PS 637: Performed by: HOSPITALIST

## 2025-02-25 PROCEDURE — 250N000011 HC RX IP 250 OP 636: Performed by: HOSPITALIST

## 2025-02-25 PROCEDURE — 120N000001 HC R&B MED SURG/OB

## 2025-02-25 PROCEDURE — 83735 ASSAY OF MAGNESIUM: CPT | Performed by: HOSPITALIST

## 2025-02-25 PROCEDURE — 36415 COLL VENOUS BLD VENIPUNCTURE: CPT | Performed by: HOSPITALIST

## 2025-02-25 PROCEDURE — 250N000013 HC RX MED GY IP 250 OP 250 PS 637: Performed by: INTERNAL MEDICINE

## 2025-02-25 PROCEDURE — 250N000013 HC RX MED GY IP 250 OP 250 PS 637: Performed by: EMERGENCY MEDICINE

## 2025-02-25 RX ADMIN — CLONIDINE HYDROCHLORIDE 0.05 MG: 0.1 TABLET ORAL at 09:24

## 2025-02-25 RX ADMIN — DIAZEPAM 10 MG: 10 TABLET ORAL at 06:26

## 2025-02-25 RX ADMIN — FAMOTIDINE 20 MG: 20 TABLET, FILM COATED ORAL at 19:42

## 2025-02-25 RX ADMIN — FOLIC ACID 1 MG: 1 TABLET ORAL at 09:18

## 2025-02-25 RX ADMIN — GABAPENTIN 900 MG: 300 CAPSULE ORAL at 17:28

## 2025-02-25 RX ADMIN — HYDROXYZINE HYDROCHLORIDE 25 MG: 25 TABLET, FILM COATED ORAL at 09:32

## 2025-02-25 RX ADMIN — FAMOTIDINE 20 MG: 20 TABLET, FILM COATED ORAL at 09:18

## 2025-02-25 RX ADMIN — DIAZEPAM 10 MG: 10 TABLET ORAL at 18:58

## 2025-02-25 RX ADMIN — DIAZEPAM 10 MG: 10 TABLET ORAL at 00:50

## 2025-02-25 RX ADMIN — DIAZEPAM 10 MG: 10 TABLET ORAL at 21:10

## 2025-02-25 RX ADMIN — CLONIDINE HYDROCHLORIDE 0.05 MG: 0.1 TABLET ORAL at 17:30

## 2025-02-25 RX ADMIN — CLONIDINE HYDROCHLORIDE 0.05 MG: 0.1 TABLET ORAL at 02:22

## 2025-02-25 RX ADMIN — SERTRALINE HYDROCHLORIDE 25 MG: 25 TABLET ORAL at 09:18

## 2025-02-25 RX ADMIN — Medication 5 MG: at 00:50

## 2025-02-25 RX ADMIN — DIAZEPAM 10 MG: 10 TABLET ORAL at 15:29

## 2025-02-25 RX ADMIN — DIAZEPAM 10 MG: 10 TABLET ORAL at 09:24

## 2025-02-25 RX ADMIN — GABAPENTIN 900 MG: 300 CAPSULE ORAL at 09:18

## 2025-02-25 RX ADMIN — CALCIUM 500 MG: 500 TABLET ORAL at 09:18

## 2025-02-25 RX ADMIN — GABAPENTIN 900 MG: 300 CAPSULE ORAL at 02:21

## 2025-02-25 RX ADMIN — Medication 1 TABLET: at 09:18

## 2025-02-25 RX ADMIN — THIAMINE HYDROCHLORIDE 100 MG: 100 INJECTION, SOLUTION INTRAMUSCULAR; INTRAVENOUS at 09:19

## 2025-02-25 RX ADMIN — CALCIUM 500 MG: 500 TABLET ORAL at 17:30

## 2025-02-25 ASSESSMENT — ACTIVITIES OF DAILY LIVING (ADL)
ADLS_ACUITY_SCORE: 26

## 2025-02-25 NOTE — PLAN OF CARE
"To Do:  End of Shift Summary  For vital signs and complete assessments, please see documentation flowsheets.     Pertinent assessments: A&Ox4. VSS on RA. Reported abdominal discomfort and nausea. Anxious, has tremors and slight sweating. On CIWA. PRN Valium given x3. Independent with cares. On regular diet & tolerating well.     /78   Pulse 89   Temp 98.1  F (36.7  C) (Oral)   Resp 17   Ht 1.702 m (5' 7\")   Wt 64.3 kg (141 lb 12.8 oz)   LMP 01/07/2025 (Approximate)   SpO2 98%   Breastfeeding No   BMI 22.21 kg/m       Major Shift Events: None     Treatment Plan: CIWA, Chem dep & Pschy following     Bedside Nurse: Izabel Arredondo RN                                                 Goal Outcome Evaluation:         Problem: Adult Inpatient Plan of Care  Goal: Plan of Care Review  Description: The Plan of Care Review/Shift note should be completed every shift.  The Outcome Evaluation is a brief statement about your assessment that the patient is improving, declining, or no change.  This information will be displayed automatically on your shift  note.  Outcome: Progressing  Goal: Patient-Specific Goal (Individualized)  Description: You can add care plan individualizations to a care plan. Examples of Individualization might be:  \"Parent requests to be called daily at 9am for status\", \"I have a hard time hearing out of my right ear\", or \"Do not touch me to wake me up as it startles  me\".  Outcome: Progressing  Goal: Absence of Hospital-Acquired Illness or Injury  Outcome: Progressing  Intervention: Identify and Manage Fall Risk  Recent Flowsheet Documentation  Taken 2/24/2025 1548 by Izabel Arredondo RN  Safety Promotion/Fall Prevention:   activity supervised   assistive device/personal items within reach  Intervention: Prevent Skin Injury  Recent Flowsheet Documentation  Taken 2/24/2025 1548 by Izabel Arredondo RN  Body Position: position changed independently  Intervention: Prevent and Manage " VTE (Venous Thromboembolism) Risk  Recent Flowsheet Documentation  Taken 2/24/2025 1548 by Izabel Arredondo RN  VTE Prevention/Management: compression stockings off  Intervention: Prevent Infection  Recent Flowsheet Documentation  Taken 2/24/2025 1548 by Izabel Arredondo RN  Infection Prevention:   cohorting utilized   hand hygiene promoted   rest/sleep promoted   single patient room provided  Goal: Optimal Comfort and Wellbeing  Outcome: Progressing  Goal: Readiness for Transition of Care  Outcome: Progressing     Problem: Skin Injury Risk Increased  Goal: Skin Health and Integrity  Outcome: Progressing  Intervention: Plan: Nurse Driven Intervention: Moisture Management  Recent Flowsheet Documentation  Taken 2/24/2025 1849 by Izabel Arredondo RN  Moisture Interventions:   No brief in bed   Encourage regular toileting  Bathing/Skin Care: moisturizer applied  Taken 2/24/2025 1548 by Izabel Arredondo RN  Moisture Interventions: Encourage regular toileting  Intervention: Optimize Skin Protection  Recent Flowsheet Documentation  Taken 2/24/2025 1548 by Izabel Arredondo RN  Activity Management:   activity adjusted per tolerance   activity encouraged   activity minimized  Head of Bed (HOB) Positioning: HOB at 60-90 degrees     Problem: Alcohol Withdrawal  Goal: Alcohol Withdrawal Symptom Control  Outcome: Progressing  Goal: Optimal Neurologic Function  Outcome: Progressing  Goal: Readiness for Change Identified  Outcome: Progressing

## 2025-02-25 NOTE — PROGRESS NOTES
[facility]    Medicine Progress Note - Hospitalist Service    Date of Admission:  2/22/2025    Assessment & Plan   Jacquie Henderson is a 34 year old female admitted on 2/22/2025. She presents to the emergency department after binging on alcohol, asking for help.  She was having tremors but otherwise is not exhibiting any signs of inebriation despite of blood alcohol level of 0.32.      Alcohol use disorder.  Alcohol intoxication.  Acute alcohol withdrawals.  -EtOH 0.32 at time of presentation.  -Continues having tremors.  -Decrease scheduled clonidine to 0.05 mg every 8 hours with hold parameters.  -Continue gabapentin taper protocol.  -Vitamins with folic acid, thiamine, multivitamin.  -CIWA protocol monitoring.  -Diazepam if needed.  -Highly appreciate input from social service and CV service  -Ms. Winn mentioned that she was able to talk to therapy services here and she will be following up with them as outpatient as she has been dealing with grief and depressive symptoms.  Seen by psychiatry service here and started on SSRI with sertraline will be continued upon discharge  -Stopped previous IV fluids.     Starvation ketosis.  -Ketones elevated at 1.62 at time of presentation.  -Has been on IV fluids with dextrose.  -Now tolerating diet.  -Stop IV fluids.  -Encouraged continued good oral intake.  -Ketonemia now resolved.     Anxiety.  Depression.  -Continue sertraline 25 mg daily.  -Psychiatry consult appreciated.     Lactic acidosis.  -Likely due to alcohol intoxication and dehydration.  -Resolved.     Hypocalcemia.  -Continue calcium carbonate 500 mg twice a day.     Anemia.- stable  Leukopenia.  -Stable.  -Recheck CBC intermittently.     Near syncopal events.    Previous palpitations.  -Echocardiogram on 2/23 unremarkable.  -Has previously been on event monitor in the outpatient setting.  -consider cardiology referral in the outpatient setting at time of discharge.    Left upper  quadrant abdominal pain.  -Previous lipase level normal.  -Check abdominal x-ray.          Diet: Combination Diet Regular Diet    DVT Prophylaxis: Pneumatic Compression Devices and Ambulate every shift  Lamb Catheter: Not present  Lines: None     Cardiac Monitoring: ACTIVE order. Indication: Tachyarrhythmias, acute (48 hours)  Code Status: Full Code      Clinically Significant Risk Factors             # Hypomagnesemia: Lowest Mg = 1.6 mg/dL in last 2 days, will replace as needed                             Social Drivers of Health            Disposition Plan     Medically Ready for Discharge: Anticipated Tomorrow if you continues to show improvement with resolution of alcohol withdrawals symptomatology             Al Lance Delatorre MD, MD  Hospitalist Service  [facility]  Securely message with [employer] (more info)  Text page via [employer] Paging/Directory   ______________________________________________________________________    Interval History   I assumed medicine service care today.  Seen and examined.  Chart reviewed.  Case discussed with nursing service.  I met this young pleasant lady this morning while she is sitting comfortably in the hospital bed and trying to eat and tolerate her breakfast food tray.  She mentioned that she is still having some intermittent sensation of palpitations and feeling of anxiousness.  She is still demonstrating hand tremors and even requiring benzodiazepine administration earlier.  Fortunately she is not agitated nor combative.  Denies any ongoing nausea or vomiting.  No reported diarrhea.  Denies any chest pain or shortness of breath.  Currently not requiring any oxygen support.    Physical Exam   Vital Signs: Temp: 97.8  F (36.6  C) Temp src: Oral BP: 130/80 Pulse: 88   Resp: 16 SpO2: 99 % O2 Device: None (Room air)    Weight: 141 lbs 12.8 oz    HEENT; Atraumatic, normocephalic, pinkish conjuctiva, pupils bilateral reactive   Skin: warm and moist, no  rashes  Lungs: equal chest expansion, clear to auscultation, no wheezes, no stridor, no crackles,   Heart: normal rate, normal rhythm, no rubs or gallops.   Abdomen: normal bowel sounds, no tenderness, no peritoneal signs, no guarding  Extremities: no deformities, no edema   Neuro; follow commands, alert and oriented x3, spontaneous speech, coherent, moves all extremities spontaneously  Psych; no hallucination, euthymic mood, not agitated      Medical Decision Making       40 MINUTES SPENT BY ME on the date of service doing chart review, history, exam, documentation & further activities per the note.  MANAGEMENT DISCUSSED with the following over the past 24 hours: Yes   NOTE(S)/MEDICAL RECORDS REVIEWED over the past 24 hours: Yes       Data     I have personally reviewed the following data over the past 24 hrs:    N/A  \   N/A   / N/A     N/A N/A N/A /  N/A   4.2 N/A N/A \       Imaging results reviewed over the past 24 hrs:   Recent Results (from the past 24 hours)   XR Abdomen 2 Views    Narrative    EXAM: XR ABDOMEN 2 VIEWS  LOCATION: [facility]  DATE: 2/24/2025    INDICATION: ab pain  COMPARISON: Chest radiograph 2/22/2025      Impression    IMPRESSION: Nonobstructive bowel gas pattern. Moderate volume stool burden in the colon and rectum. Curvilinear midline metallic density projecting over the lower abdomen is likely external to the patient.

## 2025-02-25 NOTE — PLAN OF CARE
"Goal Outcome Evaluation:    Care From: 7197-0596  Admitted Diagnosis: Etoh WD      Plan of Care Reviewed With: patient    Overall Patient Progress: improvingOverall Patient Progress: improving    Outcome Evaluation: A&Ox4.VSS ex for tachycardia with activity. CIWA on this shift 10 and 6, Valim 10 mg oral given once.  Indepedent.    Vital signs:  Temp: 97.5  F (36.4  C) Temp src: Oral BP: 118/71 Pulse: 89   Resp: 15 SpO2: 99 % O2 Device: None (Room air)   Alert and oriented x4. Reported mild headache and abdominal pain. Scored 10 and 6 on CIWA. Valium given once. PRN Melatonin also given for sleep. On tele SR. On Mg and K+ High replacement protocol. Recheck in AM. On regular diet. Good appetite per pt. IV saline locked. Seen by CD and provided with CD community resources. Up independently. Call light in reach.     Problem: Adult Inpatient Plan of Care  Goal: Plan of Care Review  Description: The Plan of Care Review/Shift note should be completed every shift.  The Outcome Evaluation is a brief statement about your assessment that the patient is improving, declining, or no change.  This information will be displayed automatically on your shift  note.  Outcome: Progressing  Flowsheets (Taken 2/25/2025 0443)  Outcome Evaluation: A&Ox4.VSS ex for tachycardia with activity. CIWA on this shift 10 and 6, Valim 10 mg oral given once.  Indepedent.  Plan of Care Reviewed With: patient  Overall Patient Progress: improving  Goal: Patient-Specific Goal (Individualized)  Description: You can add care plan individualizations to a care plan. Examples of Individualization might be:  \"Parent requests to be called daily at 9am for status\", \"I have a hard time hearing out of my right ear\", or \"Do not touch me to wake me up as it startles  me\".  Outcome: Progressing  Goal: Absence of Hospital-Acquired Illness or Injury  Outcome: Progressing  Intervention: Identify and Manage Fall Risk  Recent Flowsheet Documentation  Taken 2/25/2025 0035 " by Medhat Smith RN  Safety Promotion/Fall Prevention: clutter free environment maintained  Intervention: Prevent Skin Injury  Recent Flowsheet Documentation  Taken 2/25/2025 0035 by Medhat Smith, RN  Body Position: position changed independently  Goal: Optimal Comfort and Wellbeing  Outcome: Progressing  Goal: Readiness for Transition of Care  Outcome: Progressing     Problem: Skin Injury Risk Increased  Goal: Skin Health and Integrity  Outcome: Progressing  Intervention: Optimize Skin Protection  Recent Flowsheet Documentation  Taken 2/25/2025 0035 by Medhat Smith, RN  Activity Management: up ad teodoro     Problem: Alcohol Withdrawal  Goal: Alcohol Withdrawal Symptom Control  Outcome: Progressing  Goal: Optimal Neurologic Function  Outcome: Progressing  Goal: Readiness for Change Identified  Outcome: Progressing

## 2025-02-25 NOTE — PLAN OF CARE
"Assessments:   A&Ox4. VSS, on RA. Reported abdominal discomfort and nausea. CIWA scores 7,11 down to 0 post Valium and 11, Independent with cares. On regular diet & tolerating well.     Treatment Plan: CIWA, Chem dep & Pschy following     Bedside Nurse: Loki Gong RN       Problem: Adult Inpatient Plan of Care  Goal: Plan of Care Review  Description: The Plan of Care Review/Shift note should be completed every shift.  The Outcome Evaluation is a brief statement about your assessment that the patient is improving, declining, or no change.  This information will be displayed automatically on your shift  note.  Outcome: Progressing  Flowsheets (Taken 2/25/2025 1532)  Outcome Evaluation: tachycardic with activity, valium given x2 for CIWA of 11 and 11  Plan of Care Reviewed With: patient  Overall Patient Progress: no change  Goal: Patient-Specific Goal (Individualized)  Description: You can add care plan individualizations to a care plan. Examples of Individualization might be:  \"Parent requests to be called daily at 9am for status\", \"I have a hard time hearing out of my right ear\", or \"Do not touch me to wake me up as it startles  me\".  Outcome: Progressing  Goal: Absence of Hospital-Acquired Illness or Injury  Outcome: Progressing  Intervention: Identify and Manage Fall Risk  Recent Flowsheet Documentation  Taken 2/25/2025 0900 by Loki Gong RN  Safety Promotion/Fall Prevention:   safety round/check completed   room near nurse's station   treat reversible contributory factors  Intervention: Prevent Skin Injury  Recent Flowsheet Documentation  Taken 2/25/2025 0900 by oLki Gong RN  Body Position: position changed independently  Intervention: Prevent and Manage VTE (Venous Thromboembolism) Risk  Recent Flowsheet Documentation  Taken 2/25/2025 0900 by Loki Gong RN  VTE Prevention/Management: compression stockings off  Intervention: Prevent Infection  Recent Flowsheet " Documentation  Taken 2/25/2025 0900 by Loki Gong RN  Infection Prevention: rest/sleep promoted  Goal: Optimal Comfort and Wellbeing  Outcome: Progressing  Intervention: Monitor Pain and Promote Comfort  Recent Flowsheet Documentation  Taken 2/25/2025 0900 by Loki Gong RN  Pain Management Interventions: rest  Goal: Readiness for Transition of Care  Outcome: Progressing     Problem: Skin Injury Risk Increased  Goal: Skin Health and Integrity  Outcome: Progressing  Intervention: Plan: Nurse Driven Intervention: Moisture Management  Recent Flowsheet Documentation  Taken 2/25/2025 0900 by Loki Gong RN  Moisture Interventions: Encourage regular toileting  Intervention: Optimize Skin Protection  Recent Flowsheet Documentation  Taken 2/25/2025 0900 by Loki Gong RN  Activity Management: up ad teodoro  Head of Bed (HOB) Positioning: HOB at 60-90 degrees     Problem: Alcohol Withdrawal  Goal: Alcohol Withdrawal Symptom Control  Outcome: Progressing  Goal: Optimal Neurologic Function  Outcome: Progressing  Goal: Readiness for Change Identified  Outcome: Progressing     Goal Outcome Evaluation:      Plan of Care Reviewed With: patient    Overall Patient Progress: no change    Outcome Evaluation: tachycardic with activity, valium given x2 for CIWA of 11 and 11

## 2025-02-26 PROCEDURE — 99232 SBSQ HOSP IP/OBS MODERATE 35: CPT | Performed by: INTERNAL MEDICINE

## 2025-02-26 PROCEDURE — 250N000013 HC RX MED GY IP 250 OP 250 PS 637: Performed by: INTERNAL MEDICINE

## 2025-02-26 PROCEDURE — 120N000001 HC R&B MED SURG/OB

## 2025-02-26 PROCEDURE — 250N000013 HC RX MED GY IP 250 OP 250 PS 637: Performed by: EMERGENCY MEDICINE

## 2025-02-26 PROCEDURE — 250N000013 HC RX MED GY IP 250 OP 250 PS 637: Performed by: HOSPITALIST

## 2025-02-26 PROCEDURE — 250N000011 HC RX IP 250 OP 636: Performed by: HOSPITALIST

## 2025-02-26 RX ORDER — DIAZEPAM 5 MG/1
10 TABLET ORAL EVERY 6 HOURS PRN
Status: DISCONTINUED | OUTPATIENT
Start: 2025-02-26 | End: 2025-02-27 | Stop reason: HOSPADM

## 2025-02-26 RX ADMIN — DIAZEPAM 10 MG: 5 TABLET ORAL at 15:49

## 2025-02-26 RX ADMIN — Medication 5 MG: at 22:58

## 2025-02-26 RX ADMIN — DIAZEPAM 10 MG: 5 TABLET ORAL at 23:44

## 2025-02-26 RX ADMIN — GABAPENTIN 600 MG: 300 CAPSULE ORAL at 02:40

## 2025-02-26 RX ADMIN — DIAZEPAM 10 MG: 10 TABLET ORAL at 04:02

## 2025-02-26 RX ADMIN — CLONIDINE HYDROCHLORIDE 0.05 MG: 0.1 TABLET ORAL at 02:41

## 2025-02-26 RX ADMIN — FOLIC ACID 1 MG: 1 TABLET ORAL at 08:47

## 2025-02-26 RX ADMIN — FOLIC ACID 1 MG: 5 INJECTION, SOLUTION INTRAMUSCULAR; INTRAVENOUS; SUBCUTANEOUS at 08:50

## 2025-02-26 RX ADMIN — GABAPENTIN 600 MG: 300 CAPSULE ORAL at 10:07

## 2025-02-26 RX ADMIN — FAMOTIDINE 20 MG: 20 TABLET, FILM COATED ORAL at 08:47

## 2025-02-26 RX ADMIN — HYDROXYZINE HYDROCHLORIDE 25 MG: 25 TABLET, FILM COATED ORAL at 22:58

## 2025-02-26 RX ADMIN — CLONIDINE HYDROCHLORIDE 0.05 MG: 0.1 TABLET ORAL at 17:40

## 2025-02-26 RX ADMIN — Medication 1 TABLET: at 08:46

## 2025-02-26 RX ADMIN — CALCIUM 500 MG: 500 TABLET ORAL at 08:46

## 2025-02-26 RX ADMIN — DIAZEPAM 10 MG: 10 TABLET ORAL at 08:47

## 2025-02-26 RX ADMIN — DIAZEPAM 10 MG: 10 TABLET ORAL at 00:42

## 2025-02-26 RX ADMIN — DIAZEPAM 10 MG: 10 TABLET ORAL at 03:16

## 2025-02-26 RX ADMIN — DIAZEPAM 10 MG: 10 TABLET ORAL at 02:40

## 2025-02-26 RX ADMIN — SERTRALINE HYDROCHLORIDE 25 MG: 25 TABLET ORAL at 08:46

## 2025-02-26 RX ADMIN — HYDROXYZINE HYDROCHLORIDE 25 MG: 25 TABLET, FILM COATED ORAL at 00:39

## 2025-02-26 RX ADMIN — FAMOTIDINE 20 MG: 20 TABLET, FILM COATED ORAL at 17:31

## 2025-02-26 RX ADMIN — ACETAMINOPHEN 650 MG: 325 TABLET, FILM COATED ORAL at 15:48

## 2025-02-26 RX ADMIN — GABAPENTIN 600 MG: 300 CAPSULE ORAL at 17:32

## 2025-02-26 RX ADMIN — CALCIUM 500 MG: 500 TABLET ORAL at 17:32

## 2025-02-26 RX ADMIN — CLONIDINE HYDROCHLORIDE 0.05 MG: 0.1 TABLET ORAL at 10:07

## 2025-02-26 RX ADMIN — THIAMINE HYDROCHLORIDE 100 MG: 100 INJECTION, SOLUTION INTRAMUSCULAR; INTRAVENOUS at 08:47

## 2025-02-26 ASSESSMENT — ACTIVITIES OF DAILY LIVING (ADL)
ADLS_ACUITY_SCORE: 30
ADLS_ACUITY_SCORE: 30
ADLS_ACUITY_SCORE: 26
ADLS_ACUITY_SCORE: 26
ADLS_ACUITY_SCORE: 30
ADLS_ACUITY_SCORE: 26
ADLS_ACUITY_SCORE: 30
ADLS_ACUITY_SCORE: 30
ADLS_ACUITY_SCORE: 26
ADLS_ACUITY_SCORE: 26
ADLS_ACUITY_SCORE: 30
ADLS_ACUITY_SCORE: 26
ADLS_ACUITY_SCORE: 26
ADLS_ACUITY_SCORE: 30
ADLS_ACUITY_SCORE: 30
ADLS_ACUITY_SCORE: 26
ADLS_ACUITY_SCORE: 30
ADLS_ACUITY_SCORE: 26

## 2025-02-26 NOTE — PROGRESS NOTES
[facility]    Medicine Progress Note - Hospitalist Service    Date of Admission:  2/22/2025    Assessment & Plan   Jacquie Henderson is a 34 year old female admitted on 2/22/2025. She presents to the emergency department after binging on alcohol, asking for help.  She was having tremors but otherwise is not exhibiting any signs of inebriation despite of blood alcohol level of 0.32.      Alcohol use disorder.  Alcohol intoxication.  Acute alcohol withdrawals.  -EtOH 0.32 at time of presentation.  -Continues having tremors.  -Decrease scheduled clonidine to 0.05 mg every 8 hours with hold parameters.  -Continue gabapentin taper protocol.  -Vitamins with folic acid, thiamine, multivitamin.  -Improved but earlier was still scoring with CIWA  -Unsure if truly from alcohol withdrawals or for underlying baseline anxiety  -Benzodiazepine triggered CIWA protocol  -Changed to Valium as needed dosing for anxiety, agitation  -Highly appreciate input from social service and CV service  -Ms. Winn mentioned that she was able to talk to therapy services here and she will be following up with them as outpatient as she has been dealing with grief and depressive symptoms.  Seen by psychiatry service here and started on SSRI with sertraline will be continued upon discharge  -Stopped previous IV fluids.     Starvation ketosis.  -Ketones elevated at 1.62 at time of presentation.  -Has been on IV fluids with dextrose.  -Now tolerating diet.  -Stop IV fluids.  -Encouraged continued good oral intake.  -Ketonemia now resolved.     Anxiety.  Depression.  -Continue sertraline 25 mg daily.  -Psychiatry consult appreciated.     Lactic acidosis.  -Likely due to alcohol intoxication and dehydration.  -Resolved.     Hypocalcemia.  -Continue calcium carbonate 500 mg twice a day.     Anemia.- stable  Leukopenia.  -Stable.  -Recheck CBC intermittently.     Near syncopal events.    Previous palpitations.  -Echocardiogram on 2/23  unremarkable.  -Has previously been on event monitor in the outpatient setting.  -consider cardiology referral in the outpatient setting at time of discharge.    Left upper quadrant abdominal pain.  -Previous lipase level normal.  -Check abdominal x-ray.          Diet: Combination Diet Regular Diet    DVT Prophylaxis: Pneumatic Compression Devices and Ambulate every shift  Lamb Catheter: Not present  Lines: None     Cardiac Monitoring: ACTIVE order. Indication: Tachyarrhythmias, acute (48 hours)  Code Status: Full Code      Clinically Significant Risk Factors                                         Social Drivers of Health            Disposition Plan     Medically Ready for Discharge: Anticipated Tomorrow if you continues to show improvement with resolution of alcohol withdrawals symptomatology             Al Lance Delatorre MD, MD  Hospitalist Service  [facility]  Securely message with [employer] (more info)  Text page via [employer] Paging/Directory   ______________________________________________________________________    Interval History   Continuing medicine service care today.  Seen and examined.  Chart reviewed.  Case discussed with nursing service.    Patient mentioned that overnight she still having intermittent nausea but no actual vomiting spells.  She was scoring on CIWA monitoring and required 40 mg of Valium overnight   Currently she is awake, alert, oriented and just finished her breakfast food tray with no issues of any nausea or vomiting.    Denies any bleeding tendencies.  Voiding freely.  No BM yet.  Not requiring oxygen support.    No reports of being agitated or combative.  Still feeling anxious and reportedly earlier still having tremors    Physical Exam   Vital Signs: Temp: 97.9  F (36.6  C) Temp src: Oral BP: 114/74 Pulse: 90   Resp: 16 SpO2: 95 % O2 Device: None (Room air)    Weight: 141 lbs 12.8 oz    HEENT; Atraumatic, normocephalic, pinkish conjuctiva, pupils bilateral  reactive   Skin: warm and moist, no rashes  Lungs: equal chest expansion, clear to auscultation, no wheezes, no stridor, no crackles,   Heart: normal rate, normal rhythm, no rubs or gallops.   Abdomen: normal bowel sounds, no tenderness, no peritoneal signs, no guarding  Extremities: no deformities, no edema   Neuro; follow commands, alert and oriented x3, spontaneous speech, coherent, moves all extremities spontaneously  Psych; no hallucination, euthymic mood, not agitated      Medical Decision Making       40 MINUTES SPENT BY ME on the date of service doing chart review, history, exam, documentation & further activities per the note.  MANAGEMENT DISCUSSED with the following over the past 24 hours: Yes   NOTE(S)/MEDICAL RECORDS REVIEWED over the past 24 hours: Yes       Data         Imaging results reviewed over the past 24 hrs:   No results found for this or any previous visit (from the past 24 hours).

## 2025-02-26 NOTE — Clinical Note
"End of shift summary-0700-1500  Dx: Alcohol use disorder,   A/O: Alert and Oriented x4  Diet: Regular  Fluids: is Saline locked.  Transfer: are SBA with no assistive devices   Bathroom:  Pain: complaining of 2/10 pain in their abd.  Declines need for intervention.  Telemetry Monitoring: Yes - {rhythm/rate:858429}  Treatment:  Discharge Plans:        Blood pressure 114/74, pulse 90, temperature 97.9  F (36.6  C), temperature source Oral, resp. rate 16, height 1.702 m (5' 7\"), weight 64.3 kg (141 lb 12.8 oz), last menstrual period 01/07/2025, SpO2 95%, not currently breastfeeding.       "

## 2025-02-26 NOTE — PLAN OF CARE
"End of Shift Summary  For vital signs and complete assessments, please see documentation flowsheets.     Pertinent assessments: VSS. Afebrile. LS clear. BS x4. Pt reporting 4/10 rib/headache pain, treated with Atarax and other scheduled meds. Regular diet. Independently ambulating. PIV - SL. CIWA scores of 14, 21, 20, and 10; total of 40 mg Valium given. A&O, calls appropriately for assistance. Slept on/off.    Major Shift Events: none     Treatment Plan: Continue to monitor CIWA, Encourage activity and PO intake, Discharge TBD.      Goal Outcome Evaluation:      Plan of Care Reviewed With: patient    Overall Patient Progress: no change    Outcome Evaluation: 40 mg Valium given, Atarax for sleep, Slept on/off.      Problem: Adult Inpatient Plan of Care  Goal: Plan of Care Review  Description: The Plan of Care Review/Shift note should be completed every shift.  The Outcome Evaluation is a brief statement about your assessment that the patient is improving, declining, or no change.  This information will be displayed automatically on your shift  note.  Outcome: Not Progressing  Flowsheets (Taken 2/26/2025 0624)  Outcome Evaluation: 40 mg Valium given, Atarax for sleep, Slept on/off.  Plan of Care Reviewed With: patient  Overall Patient Progress: no change  Goal: Patient-Specific Goal (Individualized)  Description: You can add care plan individualizations to a care plan. Examples of Individualization might be:  \"Parent requests to be called daily at 9am for status\", \"I have a hard time hearing out of my right ear\", or \"Do not touch me to wake me up as it startles  me\".  Outcome: Not Progressing  Goal: Absence of Hospital-Acquired Illness or Injury  Outcome: Not Progressing  Intervention: Identify and Manage Fall Risk  Recent Flowsheet Documentation  Taken 2/26/2025 0039 by Karoline Eckert RN  Safety Promotion/Fall Prevention:   assistive device/personal items within reach   clutter free environment maintained   " increased rounding and observation   increase visualization of patient   lighting adjusted   mobility aid in reach   nonskid shoes/slippers when out of bed   patient and family education   room near nurse's station   room organization consistent   safety round/check completed   treat reversible contributory factors   treat underlying cause  Intervention: Prevent Skin Injury  Recent Flowsheet Documentation  Taken 2/26/2025 0039 by Karoline Eckert RN  Body Position:   position changed independently   supine, head elevated  Intervention: Prevent and Manage VTE (Venous Thromboembolism) Risk  Recent Flowsheet Documentation  Taken 2/26/2025 0039 by Karoline Eckert RN  VTE Prevention/Management: SCDs off (sequential compression devices)  Intervention: Prevent Infection  Recent Flowsheet Documentation  Taken 2/26/2025 0039 by Karoline Eckert RN  Infection Prevention:   cohorting utilized   rest/sleep promoted   single patient room provided  Goal: Optimal Comfort and Wellbeing  Outcome: Not Progressing  Intervention: Monitor Pain and Promote Comfort  Recent Flowsheet Documentation  Taken 2/26/2025 0039 by Karoline Eckert RN  Pain Management Interventions: distraction  Goal: Readiness for Transition of Care  Outcome: Not Progressing     Problem: Skin Injury Risk Increased  Goal: Skin Health and Integrity  Outcome: Not Progressing  Intervention: Optimize Skin Protection  Recent Flowsheet Documentation  Taken 2/26/2025 0039 by Karoline Eckert RN  Activity Management:   activity adjusted per tolerance   up ad teodoro  Head of Bed (HOB) Positioning: HOB at 60 degrees     Problem: Alcohol Withdrawal  Goal: Alcohol Withdrawal Symptom Control  Outcome: Not Progressing  Goal: Optimal Neurologic Function  Outcome: Not Progressing  Goal: Readiness for Change Identified  Outcome: Not Progressing

## 2025-02-26 NOTE — PLAN OF CARE
"Pertinent assessments: A&Ox4. On room air. Up independently in the room. Ambulates the hallways accompanied by her Grandmother. Telemetry monitoring in place, tachycardia reported. Saline locked PIV. Tolerating a regular diet. CIWA: 11, 7, 14, 6, 12, 6. PRN PO Valium given per CIWA guidelines.     Major Shift Events: none.    Treatment Plan: CIWA, telemetry, Chem Dep/Psych, gabapentin taper, Zoloft, and discharge pending.     Bedside Nurse: Ward Donovan RN    Problem: Adult Inpatient Plan of Care  Goal: Plan of Care Review  Description: The Plan of Care Review/Shift note should be completed every shift.  The Outcome Evaluation is a brief statement about your assessment that the patient is improving, declining, or no change.  This information will be displayed automatically on your shift  note.  Outcome: Progressing  Flowsheets (Taken 2/25/2025 2248)  Outcome Evaluation: Continue telemetry, CIWA, and give PRN Valium per orders.  Plan of Care Reviewed With: patient  Overall Patient Progress: no change  Goal: Patient-Specific Goal (Individualized)  Description: You can add care plan individualizations to a care plan. Examples of Individualization might be:  \"Parent requests to be called daily at 9am for status\", \"I have a hard time hearing out of my right ear\", or \"Do not touch me to wake me up as it startles  me\".  Outcome: Progressing  Goal: Absence of Hospital-Acquired Illness or Injury  Outcome: Progressing  Intervention: Identify and Manage Fall Risk  Recent Flowsheet Documentation  Taken 2/25/2025 1606 by Ward Donovan RN  Safety Promotion/Fall Prevention:   nonskid shoes/slippers when out of bed   patient and family education   safety round/check completed   treat reversible contributory factors  Intervention: Prevent Skin Injury  Recent Flowsheet Documentation  Taken 2/25/2025 1606 by Ward Donovan RN  Body Position: position changed independently  Intervention: Prevent Infection  Recent Flowsheet " Documentation  Taken 2/25/2025 1606 by Ward Donovan, RN  Infection Prevention:   cohorting utilized   hand hygiene promoted   rest/sleep promoted   single patient room provided  Goal: Optimal Comfort and Wellbeing  Outcome: Progressing  Goal: Readiness for Transition of Care  Outcome: Progressing     Goal Outcome Evaluation:      Plan of Care Reviewed With: patient    Overall Patient Progress: no change    Overall Patient Progress: no change    Outcome Evaluation: Continue telemetry, CIWA, and give PRN Valium per orders.

## 2025-02-26 NOTE — PLAN OF CARE
"Goal Outcome Evaluation:      Plan of Care Reviewed With: patient    Overall Patient Progress: improvingOverall Patient Progress: improving    Outcome Evaluation: CIWA x2 22, 13, 10mg of valium given once effective, order changed to as needed q6hrs, ambulated once this shift, continues with ongoing care.    End of shift summary-0700-1500  Dx: Alcohol use disorder, Alcohol intoxication   A/O: Alert and Oriented x4  Diet: Regular  Fluids: is Saline locked.  Transfer: are SBA with no assistive devices   Bathroom:continent with b/b.   Pain: complaining of 3/10 pain in their abd.  Declines need for intervention.  Telemetry Monitoring: Yes - normal sinus rhythm  Treatment:CIWA monitoring, Ambulation as tolerated, pain management.   Discharge Plans:TBD, continues with ongoing care.         Blood pressure 114/74, pulse 90, temperature 97.9  F (36.6  C), temperature source Oral, resp. rate 16, height 1.702 m (5' 7\"), weight 64.3 kg (141 lb 12.8 oz), last menstrual period 01/07/2025, SpO2 95%, not currently breastfeeding.     Problem: Adult Inpatient Plan of Care  Goal: Plan of Care Review  Description: The Plan of Care Review/Shift note should be completed every shift.  The Outcome Evaluation is a brief statement about your assessment that the patient is improving, declining, or no change.  This information will be displayed automatically on your shift  note.  Outcome: Progressing  Flowsheets (Taken 2/26/2025 1354)  Outcome Evaluation: CIWA x2 22, 13, 10mg of valium given once effective, order changed to as needed q6hrs, ambulated once this shift, continues with ongoing care.  Plan of Care Reviewed With: patient  Overall Patient Progress: improving     "

## 2025-02-27 VITALS
TEMPERATURE: 97.7 F | SYSTOLIC BLOOD PRESSURE: 130 MMHG | WEIGHT: 141.8 LBS | HEART RATE: 83 BPM | DIASTOLIC BLOOD PRESSURE: 79 MMHG | HEIGHT: 67 IN | BODY MASS INDEX: 22.26 KG/M2 | RESPIRATION RATE: 20 BRPM | OXYGEN SATURATION: 100 %

## 2025-02-27 LAB
MAGNESIUM SERPL-MCNC: 1.9 MG/DL (ref 1.7–2.3)
POTASSIUM SERPL-SCNC: 4 MMOL/L (ref 3.4–5.3)

## 2025-02-27 PROCEDURE — 250N000013 HC RX MED GY IP 250 OP 250 PS 637: Performed by: HOSPITALIST

## 2025-02-27 PROCEDURE — 250N000013 HC RX MED GY IP 250 OP 250 PS 637: Performed by: INTERNAL MEDICINE

## 2025-02-27 PROCEDURE — 36415 COLL VENOUS BLD VENIPUNCTURE: CPT | Performed by: HOSPITALIST

## 2025-02-27 PROCEDURE — 250N000011 HC RX IP 250 OP 636: Performed by: HOSPITALIST

## 2025-02-27 PROCEDURE — 99239 HOSP IP/OBS DSCHRG MGMT >30: CPT | Performed by: INTERNAL MEDICINE

## 2025-02-27 PROCEDURE — 83735 ASSAY OF MAGNESIUM: CPT | Performed by: HOSPITALIST

## 2025-02-27 PROCEDURE — 250N000013 HC RX MED GY IP 250 OP 250 PS 637: Performed by: EMERGENCY MEDICINE

## 2025-02-27 PROCEDURE — 84132 ASSAY OF SERUM POTASSIUM: CPT | Performed by: HOSPITALIST

## 2025-02-27 RX ORDER — FOLIC ACID 1 MG/1
1 TABLET ORAL DAILY
Qty: 30 TABLET | Refills: 0 | Status: SHIPPED | OUTPATIENT
Start: 2025-02-27

## 2025-02-27 RX ORDER — GABAPENTIN 300 MG/1
600 CAPSULE ORAL EVERY 8 HOURS
Qty: 4 CAPSULE | Refills: 0 | Status: SHIPPED | OUTPATIENT
Start: 2025-02-27 | End: 2025-02-28

## 2025-02-27 RX ORDER — GABAPENTIN 300 MG/1
300 CAPSULE ORAL EVERY 8 HOURS
Qty: 6 CAPSULE | Refills: 0 | Status: SHIPPED | OUTPATIENT
Start: 2025-02-28 | End: 2025-03-02

## 2025-02-27 RX ORDER — LANOLIN ALCOHOL/MO/W.PET/CERES
100 CREAM (GRAM) TOPICAL DAILY
Qty: 30 TABLET | Refills: 0 | Status: SHIPPED | OUTPATIENT
Start: 2025-02-27

## 2025-02-27 RX ORDER — OXYMETAZOLINE HYDROCHLORIDE 0.05 G/100ML
2 SPRAY NASAL 2 TIMES DAILY PRN
Qty: 6 ML | Refills: 0 | Status: SHIPPED | OUTPATIENT
Start: 2025-02-27

## 2025-02-27 RX ORDER — SERTRALINE HYDROCHLORIDE 25 MG/1
25 TABLET, FILM COATED ORAL DAILY
Qty: 30 TABLET | Refills: 0 | Status: SHIPPED | OUTPATIENT
Start: 2025-02-27

## 2025-02-27 RX ORDER — MAGNESIUM OXIDE 400 MG/1
400 TABLET ORAL EVERY 4 HOURS
Status: COMPLETED | OUTPATIENT
Start: 2025-02-27 | End: 2025-02-27

## 2025-02-27 RX ORDER — OXYMETAZOLINE HYDROCHLORIDE 0.05 G/100ML
2 SPRAY NASAL 2 TIMES DAILY PRN
Status: DISCONTINUED | OUTPATIENT
Start: 2025-02-27 | End: 2025-02-27 | Stop reason: HOSPADM

## 2025-02-27 RX ORDER — GABAPENTIN 100 MG/1
100 CAPSULE ORAL EVERY 8 HOURS
Qty: 9 CAPSULE | Refills: 0 | Status: SHIPPED | OUTPATIENT
Start: 2025-03-02 | End: 2025-03-05

## 2025-02-27 RX ADMIN — CLONIDINE HYDROCHLORIDE 0.05 MG: 0.1 TABLET ORAL at 01:56

## 2025-02-27 RX ADMIN — CLONIDINE HYDROCHLORIDE 0.05 MG: 0.1 TABLET ORAL at 11:12

## 2025-02-27 RX ADMIN — SERTRALINE HYDROCHLORIDE 25 MG: 25 TABLET ORAL at 08:04

## 2025-02-27 RX ADMIN — DIAZEPAM 10 MG: 5 TABLET ORAL at 08:04

## 2025-02-27 RX ADMIN — Medication 1 TABLET: at 08:03

## 2025-02-27 RX ADMIN — THIAMINE HYDROCHLORIDE 100 MG: 100 INJECTION, SOLUTION INTRAMUSCULAR; INTRAVENOUS at 08:11

## 2025-02-27 RX ADMIN — FAMOTIDINE 20 MG: 20 TABLET, FILM COATED ORAL at 08:03

## 2025-02-27 RX ADMIN — GABAPENTIN 600 MG: 300 CAPSULE ORAL at 01:56

## 2025-02-27 RX ADMIN — CALCIUM 500 MG: 500 TABLET ORAL at 08:04

## 2025-02-27 RX ADMIN — Medication 400 MG: at 11:12

## 2025-02-27 RX ADMIN — FOLIC ACID 1 MG: 1 TABLET ORAL at 08:04

## 2025-02-27 RX ADMIN — GABAPENTIN 600 MG: 300 CAPSULE ORAL at 11:12

## 2025-02-27 RX ADMIN — HYDROXYZINE HYDROCHLORIDE 25 MG: 25 TABLET, FILM COATED ORAL at 11:17

## 2025-02-27 ASSESSMENT — ACTIVITIES OF DAILY LIVING (ADL)
ADLS_ACUITY_SCORE: 30

## 2025-02-27 NOTE — DISCHARGE SUMMARY
[facility]  Hospitalist Discharge Summary      Date of Admission:  2/22/2025  Date of Discharge:  2/27/2025  Discharging Provider: Trent Delatorre MD, MD  Discharge Service: Hospitalist Service    Discharge Diagnoses   Alcohol abuse  Acute alcohol withdrawals  Alcohol dependence   alcohol intoxication  Improving resolved starvation ketosis  Hypomagnesemia  Hypocalcemia  Stable anemia  History of anxiety  Depression    Clinically Significant Risk Factors          Follow-ups Needed After Discharge   Follow-up Appointments       Follow-up and recommended labs and tests       Follow up with primary care provider, Carey Ramos, within 7 days to evaluate medication change, to evaluate treatment change, and for hospital follow- up.    Follow-up with psychiatry and therapy services as scheduled  Alcohol cessation program if desired.  Follow up with cardiology for recurrent syncope in the next 2-3 weeks                Unresulted Labs Ordered in the Past 30 Days of this Admission       No orders found from 1/23/2025 to 2/23/2025.            Discharge Disposition   Discharged to home  Condition at discharge: Stable    Hospital Course     This is a case of a pleasant 34-year-old lady with background history of alcoholism who presented with alcohol intoxication complicated with alcohol withdrawal symptomatology with accompanying starvation ketosis that subsequently improved and resolved.  Fortunately she responded with conservative approach and currently tolerating gabapentin pathway at home which will be continued upon discharge.  She was also seen by psychiatry service and was started on SSRI with sertraline at 25 mg daily that will be continued upon discharge and will need to have a close follow-up with PCP and his therapy services.  She mentioned that she has plans for continuation of alcohol cessation.  Further investigation of her syncopal event with echocardiogram showed unremarkable  findings, telemonitoring did not show any significant tachyarrhythmias.  Currently tolerating oral diet, demonstrated normal LFTs, abdominal x-ray done earlier showed no significant pathology.      Jacquie Henderson is a 34 year old female admitted on 2/22/2025. She presents to the emergency department after binging on alcohol, asking for help.  She was having tremors but otherwise is not exhibiting any signs of inebriation despite of blood alcohol level of 0.32.      Alcohol use disorder.  Alcohol intoxication.  Acute alcohol withdrawals.  -EtOH 0.32 at time of presentation.  -Continues having tremors.  -Decrease scheduled clonidine to 0.05 mg every 8 hours with hold parameters.  -Continue gabapentin taper protocol.  -Vitamins with folic acid, thiamine, multivitamin.  -Improved but earlier was still scoring with CIWA  -Unsure if truly from alcohol withdrawals or for underlying baseline anxiety  -Benzodiazepine triggered CIWA protocol  -Changed to Valium as needed dosing for anxiety, agitation  -Highly appreciate input from social service and CV service  -Ms. Winn mentioned that she was able to talk to therapy services here and she will be following up with them as outpatient as she has been dealing with grief and depressive symptoms.  Seen by psychiatry service here and started on SSRI with sertraline will be continued upon discharge  -Stopped previous IV fluids.     Starvation ketosis.  -Ketones elevated at 1.62 at time of presentation.  -Has been on IV fluids with dextrose.  -Now tolerating diet.  -Stop IV fluids.  -Encouraged continued good oral intake.  -Ketonemia now resolved.     Anxiety.  Depression.  -Continue sertraline 25 mg daily.  -Psychiatry consult appreciated.     Lactic acidosis.  -Likely due to alcohol intoxication and dehydration.  -Resolved.     Hypocalcemia.  -Continue calcium carbonate 500 mg twice a day.     Anemia.- stable  Leukopenia.  -Stable.  -Recheck CBC intermittently.     Near syncopal  events.    Previous palpitations.  -Echocardiogram on 2/23 unremarkable.  -Has previously been on event monitor in the outpatient setting.  -consider cardiology referral in the outpatient setting at time of discharge.    Left upper quadrant abdominal pain.  -Previous lipase level normal.  -Check abdominal x-ray.    Consultations This Hospital Stay   NUTRITION SERVICES ADULT IP CONSULT  CARE MANAGEMENT / SOCIAL WORK IP CONSULT  CHEMICAL DEPENDENCY IP CONSULT  PSYCHIATRY IP CONSULT    Code Status   Full Code    Time Spent on this Encounter   I, Trent Delatorre MD, MD, personally saw the patient today and spent greater than 30 minutes discharging this patient.       Trent Delatorre MD, MD  [facility] [facility] [address] [facility]  [address] [place] [address]  Phone: [phone]  Fax: [phone]  ______________________________________________________________________    Physical Exam   Vital Signs: Temp: 97.7  F (36.5  C) Temp src: Axillary BP: 130/79 Pulse: 83   Resp: 20 SpO2: 100 % O2 Device: None (Room air)    Weight: 141 lbs 12.8 oz  HEENT; Atraumatic, normocephalic, pinkish conjuctiva, pupils bilateral reactive   Skin: warm and moist, no rashes  Lungs: equal chest expansion, clear to auscultation, no wheezes, no stridor, no crackles,   Heart: normal rate, normal rhythm, no rubs or gallops.   Abdomen: normal bowel sounds, no tenderness, no peritoneal signs, no guarding  Extremities: no deformities, no edema   Neuro; follow commands, alert and oriented x3, spontaneous speech, coherent, moves all extremities spontaneously  Psych; no hallucination, euthymic mood, not agitated         Primary Care Physician   Carey Ramos    Discharge Orders      Reason for your hospital stay    This is a case of a pleasant 34-year-old lady with background history of alcoholism who presented with alcohol intoxication complicated with alcohol withdrawal symptomatology with accompanying starvation  ketosis that subsequently improved and resolved.  Fortunately she responded with conservative approach and currently tolerating gabapentin pathway at home which will be continued upon discharge.  She was also seen by psychiatry service and was started on SSRI with sertraline at 25 mg daily that will be continued upon discharge and will need to have a close follow-up with PCP and his therapy services.  She mentioned that she has plans for continuation of alcohol cessation.  Further investigation of her syncopal event with echocardiogram showed unremarkable findings, telemonitoring did not show any significant tachyarrhythmias.  Currently tolerating oral diet, demonstrated normal LFTs, abdominal x-ray done earlier showed no significant pathology.     Activity    Your activity upon discharge: activity as tolerated     Follow-up and recommended labs and tests     Follow up with primary care provider, Carey Ramos, within 7 days to evaluate medication change, to evaluate treatment change, and for hospital follow- up.    Follow-up with psychiatry and therapy services as scheduled  Alcohol cessation program if desired.  Follow up with cardiology for recurrent syncope in the next 2-3 weeks     Full Code     Diet    Follow this diet upon discharge: Current Diet:Orders Placed This Encounter      Combination Diet Regular Diet       Significant Results and Procedures   Most Recent 3 CBC's:  Recent Labs   Lab Test 02/24/25  0606 02/23/25  0507 02/22/25  1423   WBC 3.6* 3.4* 4.4   HGB 12.8 11.6* 14.4   MCV 91 89 88    217 334     Most Recent 3 BMP's:  Recent Labs   Lab Test 02/27/25  0731 02/25/25  0731 02/24/25  0606 02/24/25  0249 02/23/25  0507 02/22/25  1423   NA  --   --  136  --  138 140   POTASSIUM 4.0 4.2 4.3  --  4.6 4.0   CHLORIDE  --   --  99  --  99 93*   CO2  --   --  29  --  30* 19*   BUN  --   --  7.5  --  8.4 10.1   CR  --   --  0.67  --  0.67 0.63   ANIONGAP  --   --  8  --  9 28*   LYLE  --   --  9.5   --  8.2* 9.0   GLC  --   --  106* 134* 129* 80     Most Recent 2 LFT's:  Recent Labs   Lab Test 25  0606 25  0507   AST 41 40   ALT 35 28   ALKPHOS 71 50   BILITOTAL 0.6 0.9     Most Recent 3 INR's:  Recent Labs   Lab Test 24  1021   INR 0.96     Most Recent Hemoglobin A1c:No lab results found.  Most Recent 6 glucoses:  Recent Labs   Lab Test 25  0606 25  0249 25  0507 25  1423 25  1933 24  1607   * 134* 129* 80 118* 130*     Most Recent Urinalysis:No lab results found.  Most Recent ABG:No lab results found.,   Results for orders placed or performed during the hospital encounter of 25   Chest XR,  PA & LAT    Narrative    EXAM: XR CHEST 2 VIEWS  LOCATION: [facility]  DATE: 2025    INDICATION: chest pain  COMPARISON: X-ray 2024      Impression    IMPRESSION: Negative chest.  The lungs are clear and there are no pleural effusions. Normal heart size.   XR Abdomen 2 Views    Narrative    EXAM: XR ABDOMEN 2 VIEWS  LOCATION: [facility]  DATE: 2025    INDICATION: ab pain  COMPARISON: Chest radiograph 2025      Impression    IMPRESSION: Nonobstructive bowel gas pattern. Moderate volume stool burden in the colon and rectum. Curvilinear midline metallic density projecting over the lower abdomen is likely external to the patient.   Echocardiogram Complete     Value    LVEF  60-65%    Narrative    [id number]  [id number]  [id number]  [id number]^ANASTASIIA^ISIDRO     [facility]  Echocardiography Laboratory  [address] [place] [address]     Name: BENJI OLMEDO  MRN: [id number]  : 1990  Study Date: 2025 12:18 PM  Age: 34 yrs  Gender: Female  Patient Location: [facility]  Reason For Study: Syncope  Ordering Physician: ANASTASIIA AGUILAR  Performed By: [facility] Carolina Matos     BSA: 1.7 m2  Height: 67 in  Weight: 141 lb  HR: 90  BP: 119/77  mmHg  ______________________________________________________________________________  Procedure  Echocardiogram with two-dimensional, color and spectral Doppler.  ______________________________________________________________________________  Interpretation Summary     Normal transthoracic echocardiogram.     No prior study available for comparison.  ______________________________________________________________________________  Left Ventricle  The left ventricle is normal in size. There is normal left ventricular wall  thickness. Left ventricular systolic function is normal. The visual ejection  fraction is 60-65%. Left ventricular diastolic function is normal. Normal left  ventricular wall motion.     Right Ventricle  The right ventricle is normal size. The right ventricular systolic function is  normal.     Atria  Normal left atrial size. Right atrial size is normal.     Mitral Valve  The mitral valve is normal in structure and function. There is physiologic  mitral regurgitation. There is no mitral valve stenosis.     Tricuspid Valve  The tricuspid valve is normal in structure and function. There is trace  tricuspid regurgitation. The right ventricular systolic pressure is  approximated at 20.5 mmHg plus the right atrial pressure. IVC diameter <2.1 cm  collapsing >50% with sniff suggests a normal RA pressure of 3 mmHg.     Aortic Valve  The aortic valve is not well visualized. No aortic regurgitation is present.  No aortic stenosis is present.     Pulmonic Valve  There is trace pulmonic valvular regurgitation.     Vessels  The aortic root is normal size. Normal size ascending aorta.     Pericardium  There is no pericardial effusion.     ______________________________________________________________________________  MMode/2D Measurements & Calculations  IVSd: 0.90 cm  LVIDd: 4.1 cm  LVIDs: 2.6 cm  LVPWd: 1.0 cm  FS: 37.0 %     LV mass(C)d: 122.9 grams  LV mass(C)dI: 70.5 grams/m2  Ao root diam: 2.9 cm  asc Aorta  Diam: 2.9 cm  LVOT diam: 2.0 cm  LVOT area: 3.2 cm2  Ao root diam index Ht(cm/m): 1.7  Ao root diam index BSA (cm/m2): 1.7  Asc Ao diam index BSA (cm/m2): 1.6  Asc Ao diam index Ht(cm/m): 1.7  LA Volume (BP): 36.3 ml  LA Volume Index (BP): 20.9 ml/m2     RV Base: 3.3 cm  RWT: 0.50  TAPSE: 2.2 cm     Doppler Measurements & Calculations  MV E max malick: 70.2 cm/sec  MV A max malick: 63.2 cm/sec  MV E/A: 1.1  MV max P.4 mmHg  MV mean P.3 mmHg  MV V2 VTI: 19.8 cm  MV dec time: 0.19 sec  PA V2 max: 76.1 cm/sec  PA max P.3 mmHg  PA acc time: 0.13 sec  TR max malick: 226.4 cm/sec  TR max P.5 mmHg  E/E' av.6  Lateral E/e': 5.2  Medial E/e': 6.1  RV S Malick: 11.1 cm/sec     ______________________________________________________________________________  Report approved by: MD Luis Forrest on 2025 01:08 PM             Discharge Medications   Current Discharge Medication List        START taking these medications    Details   folic acid (FOLVITE) 1 MG tablet Take 1 tablet (1 mg) by mouth daily.  Qty: 30 tablet, Refills: 0    Associated Diagnoses: Alcohol abuse      !! gabapentin (NEURONTIN) 100 MG capsule Take 1 capsule (100 mg) by mouth every 8 hours for 9 doses.  Qty: 9 capsule, Refills: 0    Associated Diagnoses: Alcohol abuse      !! gabapentin (NEURONTIN) 300 MG capsule Take 1 capsule (300 mg) by mouth every 8 hours for 6 doses.  Qty: 6 capsule, Refills: 0    Associated Diagnoses: Alcohol abuse      !! gabapentin (NEURONTIN) 300 MG capsule Take 2 capsules (600 mg) by mouth every 8 hours for 2 doses.  Qty: 4 capsule, Refills: 0    Associated Diagnoses: Alcohol abuse      oxymetazoline (AFRIN) 0.05 % nasal spray Spray 0.2 mLs (2 sprays) into both nostrils 2 times daily as needed for congestion or other (nosebleed).  Qty: 6 mL, Refills: 0    Associated Diagnoses: Epistaxis      sertraline (ZOLOFT) 25 MG tablet Take 1 tablet (25 mg) by mouth daily.  Qty: 30 tablet, Refills: 0    Associated Diagnoses: Alcohol  abuse      thiamine (B-1) 100 MG tablet Take 1 tablet (100 mg) by mouth daily.  Qty: 30 tablet, Refills: 0    Associated Diagnoses: Alcohol abuse       !! - Potential duplicate medications found. Please discuss with provider.        CONTINUE these medications which have NOT CHANGED    Details   butalbital-acetaminophen-caffeine (ESGIC) -40 MG tablet Take 1 tablet by mouth every 6 hours as needed for migraine      Collagen-Vitamin C-Biotin (COLLAGEN PO) Take by mouth daily.      melatonin 5 MG tablet Take 5 mg by mouth nightly as needed for sleep      multivitamin (ONE-DAILY) tablet Take 1 tablet by mouth daily  Qty: 90 tablet, Refills: 3    Associated Diagnoses: Alcohol withdrawal syndrome without complication (H); Alcohol withdrawal syndrome with complication (H)      ondansetron (ZOFRAN) 4 MG tablet Take 4 mg by mouth every 8 hours as needed for nausea.      OVER-THE-COUNTER CBD w/ melatonin - take 1-2 at bedtime as needed for sleep      Probiotic Product (PROBIOTIC BLEND) CAPS Take 1 capsule by mouth daily           Allergies   Allergies   Allergen Reactions    Gluten Meal GI Disturbance

## 2025-02-27 NOTE — PLAN OF CARE
"End of Shift Summary  For vital signs and complete assessments, please see documentation flowsheets.     Pertinent assessments: VSS. Afebrile. LS clear. BS x4. Pt denies pain and nauesa. Regular diet. Independently ambulating. PIV - SL. Valium given for anxiety, pt does have a tremor. Tele monitoring - SR, HR 70s. A&O, calls appropriately for assistance. Slept well.    Major Shift Events: none    Treatment Plan: Encourage activity, Treat anxiety, Hopeful discharge home today.      Goal Outcome Evaluation:      Plan of Care Reviewed With: patient    Overall Patient Progress: improving    Outcome Evaluation: Denies pain, Valium x1, Slight tremor noted, Slept well.      Problem: Adult Inpatient Plan of Care  Goal: Plan of Care Review  Description: The Plan of Care Review/Shift note should be completed every shift.  The Outcome Evaluation is a brief statement about your assessment that the patient is improving, declining, or no change.  This information will be displayed automatically on your shift  note.  Outcome: Progressing  Flowsheets (Taken 2/27/2025 0606)  Outcome Evaluation: Denies pain, Valium x1, Slight tremor noted, Slept well.  Plan of Care Reviewed With: patient  Overall Patient Progress: improving  Goal: Patient-Specific Goal (Individualized)  Description: You can add care plan individualizations to a care plan. Examples of Individualization might be:  \"Parent requests to be called daily at 9am for status\", \"I have a hard time hearing out of my right ear\", or \"Do not touch me to wake me up as it startles  me\".  Outcome: Progressing  Goal: Absence of Hospital-Acquired Illness or Injury  Outcome: Progressing  Intervention: Identify and Manage Fall Risk  Recent Flowsheet Documentation  Taken 2/26/2025 2340 by Karoline Eckert RN  Safety Promotion/Fall Prevention:   assistive device/personal items within reach   clutter free environment maintained   increased rounding and observation   increase " visualization of patient   lighting adjusted   mobility aid in reach   nonskid shoes/slippers when out of bed   patient and family education   room near nurse's station   room organization consistent   safety round/check completed   treat reversible contributory factors   treat underlying cause  Intervention: Prevent Skin Injury  Recent Flowsheet Documentation  Taken 2/26/2025 2340 by Karoline Eckert RN  Body Position:   position changed independently   supine, head elevated  Intervention: Prevent and Manage VTE (Venous Thromboembolism) Risk  Recent Flowsheet Documentation  Taken 2/26/2025 2340 by Karoline Eckert RN  VTE Prevention/Management: SCDs off (sequential compression devices)  Intervention: Prevent Infection  Recent Flowsheet Documentation  Taken 2/26/2025 2340 by Karoline Eckert RN  Infection Prevention:   cohorting utilized   rest/sleep promoted   single patient room provided  Goal: Optimal Comfort and Wellbeing  Outcome: Progressing  Goal: Readiness for Transition of Care  Outcome: Progressing     Problem: Skin Injury Risk Increased  Goal: Skin Health and Integrity  Outcome: Progressing  Intervention: Optimize Skin Protection  Recent Flowsheet Documentation  Taken 2/26/2025 2340 by Karoline Eckert RN  Activity Management:   activity adjusted per tolerance   up ad teodoro  Head of Bed (HOB) Positioning: HOB at 30-45 degrees     Problem: Alcohol Withdrawal  Goal: Alcohol Withdrawal Symptom Control  Outcome: Progressing  Goal: Optimal Neurologic Function  Outcome: Progressing  Goal: Readiness for Change Identified  Outcome: Progressing

## 2025-02-27 NOTE — PLAN OF CARE
"Pertinent assessments: A&Ox4. On room air. Up independently in the room. Telemetry monitoring in place, tachycardia noted. Saline locked PIV. Tolerating a regular diet. CIWA: 9, 6. PRN PO Valium given per pt request.      Major Shift Events: none.     Treatment Plan: CIWA, telemetry, Chem Dep/Psych, gabapentin taper, Zoloft, and discharge pending.      Bedside Nurse: Ward Donovna RN    Problem: Adult Inpatient Plan of Care  Goal: Plan of Care Review  Description: The Plan of Care Review/Shift note should be completed every shift.  The Outcome Evaluation is a brief statement about your assessment that the patient is improving, declining, or no change.  This information will be displayed automatically on your shift  note.  Outcome: Progressing  Flowsheets (Taken 2/26/2025 2234)  Outcome Evaluation: PRN Valium given x1 per pt request.  Plan of Care Reviewed With:   patient   family  Overall Patient Progress: improving  Goal: Patient-Specific Goal (Individualized)  Description: You can add care plan individualizations to a care plan. Examples of Individualization might be:  \"Parent requests to be called daily at 9am for status\", \"I have a hard time hearing out of my right ear\", or \"Do not touch me to wake me up as it startles  me\".  Outcome: Progressing  Goal: Absence of Hospital-Acquired Illness or Injury  Outcome: Progressing  Intervention: Identify and Manage Fall Risk  Recent Flowsheet Documentation  Taken 2/26/2025 1535 by Ward Donovan RN  Safety Promotion/Fall Prevention:   nonskid shoes/slippers when out of bed   patient and family education   safety round/check completed   treat reversible contributory factors  Intervention: Prevent Skin Injury  Recent Flowsheet Documentation  Taken 2/26/2025 1535 by Ward Donovan RN  Body Position: position changed independently  Intervention: Prevent Infection  Recent Flowsheet Documentation  Taken 2/26/2025 1535 by Ward Donovan RN  Infection Prevention:   " cohorting utilized   hand hygiene promoted   rest/sleep promoted   single patient room provided  Goal: Optimal Comfort and Wellbeing  Outcome: Progressing  Intervention: Monitor Pain and Promote Comfort  Recent Flowsheet Documentation  Taken 2/26/2025 1548 by Ward Donovan, RN  Pain Management Interventions: medication (see MAR)  Goal: Readiness for Transition of Care  Outcome: Progressing     Goal Outcome Evaluation:      Plan of Care Reviewed With: patient, family    Overall Patient Progress: improving    Overall Patient Progress: improving    Outcome Evaluation: PRN Valium given x1 per pt request.

## 2025-02-27 NOTE — CARE PLAN
Discharge Note    Patient discharged to home via private vehicle  accompanied by brother.  IV: Discontinued  Prescriptions filled and given to patient/family.   Belongings reviewed and sent with patient.   Home medications returned to patient: NA  Equipment sent with: patient.   patient verbalizes understanding of discharge instructions. AVS given to patient.  Additional education completed?  Discharge meds

## 2025-02-27 NOTE — PLAN OF CARE
"Goal Outcome Evaluation:      Plan of Care Reviewed With: patient    Overall Patient Progress: improvingOverall Patient Progress: improving         To Do:  End of Shift Summary  For vital signs and complete assessments, please see documentation flowsheets.     Pertinent assessments: pt oriented x4,  VSS.LS clear. Pt denies pain and nauesa. Complained of anxiety and tremors, gave PRN valium 1x 8am and  atarax 1x before discharge, Regular diet. Independently ambulating. PIV - discontinue, Tele monitoring - discontinue,, Mag replaced, waiting for her brother to come      Major Shift Events: discharge     Treatment Plan: discharge to home     Problem: Adult Inpatient Plan of Care  Goal: Plan of Care Review  Description: The Plan of Care Review/Shift note should be completed every shift.  The Outcome Evaluation is a brief statement about your assessment that the patient is improving, declining, or no change.  This information will be displayed automatically on your shift  note.  Outcome: Progressing  Flowsheets (Taken 2/27/2025 1151)  Plan of Care Reviewed With: patient  Overall Patient Progress: improving  Goal: Patient-Specific Goal (Individualized)  Description: You can add care plan individualizations to a care plan. Examples of Individualization might be:  \"Parent requests to be called daily at 9am for status\", \"I have a hard time hearing out of my right ear\", or \"Do not touch me to wake me up as it startles  me\".  Outcome: Progressing  Goal: Absence of Hospital-Acquired Illness or Injury  Outcome: Progressing  Intervention: Identify and Manage Fall Risk  Recent Flowsheet Documentation  Taken 2/27/2025 0804 by Sal Rosenberg RN  Safety Promotion/Fall Prevention:   assistive device/personal items within reach   clutter free environment maintained   increased rounding and observation   increase visualization of patient   room near nurse's station   room organization consistent   safety round/check completed   nonskid " shoes/slippers when out of bed  Intervention: Prevent Skin Injury  Recent Flowsheet Documentation  Taken 2/27/2025 0804 by Sal Rosenberg RN  Body Position: position changed independently  Intervention: Prevent and Manage VTE (Venous Thromboembolism) Risk  Recent Flowsheet Documentation  Taken 2/27/2025 0804 by Sal Rosenberg RN  VTE Prevention/Management: SCDs off (sequential compression devices)  Intervention: Prevent Infection  Recent Flowsheet Documentation  Taken 2/27/2025 0804 by Sal Rosenberg RN  Infection Prevention:   rest/sleep promoted   single patient room provided  Goal: Optimal Comfort and Wellbeing  Outcome: Progressing  Goal: Readiness for Transition of Care  Outcome: Progressing     Problem: Skin Injury Risk Increased  Goal: Skin Health and Integrity  Outcome: Progressing  Intervention: Plan: Nurse Driven Intervention: Moisture Management  Recent Flowsheet Documentation  Taken 2/27/2025 0804 by Sal Rosenberg RN  Moisture Interventions: Encourage regular toileting  Intervention: Optimize Skin Protection  Recent Flowsheet Documentation  Taken 2/27/2025 0804 by Sal Rosenberg RN  Activity Management: activity adjusted per tolerance  Head of Bed (HOB) Positioning: HOB at 30 degrees     Problem: Alcohol Withdrawal  Goal: Alcohol Withdrawal Symptom Control  Outcome: Progressing  Goal: Optimal Neurologic Function  Outcome: Progressing  Goal: Readiness for Change Identified  Outcome: Progressing

## 2025-03-11 ENCOUNTER — APPOINTMENT (OUTPATIENT)
Dept: CT IMAGING | Facility: CLINIC | Age: 35
End: 2025-03-11
Attending: EMERGENCY MEDICINE
Payer: COMMERCIAL

## 2025-03-11 ENCOUNTER — HOSPITAL ENCOUNTER (EMERGENCY)
Facility: CLINIC | Age: 35
Discharge: HOME | End: 2025-03-12
Attending: EMERGENCY MEDICINE | Admitting: EMERGENCY MEDICINE
Payer: COMMERCIAL

## 2025-03-11 DIAGNOSIS — F10.939 ALCOHOL WITHDRAWAL, WITH UNSPECIFIED COMPLICATION (H): ICD-10-CM

## 2025-03-11 DIAGNOSIS — E83.42 HYPOMAGNESEMIA: ICD-10-CM

## 2025-03-11 DIAGNOSIS — F10.10 ALCOHOL ABUSE: ICD-10-CM

## 2025-03-11 DIAGNOSIS — R07.9 CHEST PAIN, UNSPECIFIED TYPE: ICD-10-CM

## 2025-03-11 LAB
ALBUMIN SERPL BCG-MCNC: 5 G/DL (ref 3.5–5.2)
ALP SERPL-CCNC: 71 U/L (ref 40–150)
ALT SERPL W P-5'-P-CCNC: 27 U/L (ref 0–50)
ANION GAP SERPL CALCULATED.3IONS-SCNC: 24 MMOL/L (ref 7–15)
AST SERPL W P-5'-P-CCNC: 44 U/L (ref 0–45)
ATRIAL RATE - MUSE: 110 BPM
BASOPHILS # BLD AUTO: 0.1 10E3/UL (ref 0–0.2)
BASOPHILS NFR BLD AUTO: 1 %
BILIRUB SERPL-MCNC: 0.4 MG/DL
BUN SERPL-MCNC: 7.5 MG/DL (ref 6–20)
CALCIUM SERPL-MCNC: 9.2 MG/DL (ref 8.8–10.4)
CHLORIDE SERPL-SCNC: 96 MMOL/L (ref 98–107)
CREAT SERPL-MCNC: 0.67 MG/DL (ref 0.51–0.95)
D DIMER PPP FEU-MCNC: 0.88 UG/ML FEU (ref 0–0.5)
DIASTOLIC BLOOD PRESSURE - MUSE: NORMAL MMHG
EGFRCR SERPLBLD CKD-EPI 2021: >90 ML/MIN/1.73M2
EOSINOPHIL # BLD AUTO: 0.1 10E3/UL (ref 0–0.7)
EOSINOPHIL NFR BLD AUTO: 1 %
ERYTHROCYTE [DISTWIDTH] IN BLOOD BY AUTOMATED COUNT: 12.2 % (ref 10–15)
ETHANOL SERPL-MCNC: 0.26 G/DL
GLUCOSE SERPL-MCNC: 78 MG/DL (ref 70–99)
HCG SERPL QL: NEGATIVE
HCO3 SERPL-SCNC: 22 MMOL/L (ref 22–29)
HCT VFR BLD AUTO: 39.3 % (ref 35–47)
HGB BLD-MCNC: 13.5 G/DL (ref 11.7–15.7)
IMM GRANULOCYTES # BLD: 0 10E3/UL
IMM GRANULOCYTES NFR BLD: 1 %
INTERPRETATION ECG - MUSE: NORMAL
LYMPHOCYTES # BLD AUTO: 1.3 10E3/UL (ref 0.8–5.3)
LYMPHOCYTES NFR BLD AUTO: 30 %
MAGNESIUM SERPL-MCNC: 1.6 MG/DL (ref 1.7–2.3)
MCH RBC QN AUTO: 30.3 PG (ref 26.5–33)
MCHC RBC AUTO-ENTMCNC: 34.4 G/DL (ref 31.5–36.5)
MCV RBC AUTO: 88 FL (ref 78–100)
MONOCYTES # BLD AUTO: 0.3 10E3/UL (ref 0–1.3)
MONOCYTES NFR BLD AUTO: 7 %
NEUTROPHILS # BLD AUTO: 2.6 10E3/UL (ref 1.6–8.3)
NEUTROPHILS NFR BLD AUTO: 61 %
NRBC # BLD AUTO: 0 10E3/UL
NRBC BLD AUTO-RTO: 0 /100
P AXIS - MUSE: 78 DEGREES
PLATELET # BLD AUTO: 314 10E3/UL (ref 150–450)
POTASSIUM SERPL-SCNC: 4.2 MMOL/L (ref 3.4–5.3)
PR INTERVAL - MUSE: 126 MS
PROT SERPL-MCNC: 8.5 G/DL (ref 6.4–8.3)
QRS DURATION - MUSE: 88 MS
QT - MUSE: 334 MS
QTC - MUSE: 452 MS
R AXIS - MUSE: 87 DEGREES
RBC # BLD AUTO: 4.45 10E6/UL (ref 3.8–5.2)
SODIUM SERPL-SCNC: 142 MMOL/L (ref 135–145)
SYSTOLIC BLOOD PRESSURE - MUSE: NORMAL MMHG
T AXIS - MUSE: 63 DEGREES
TROPONIN T SERPL HS-MCNC: <6 NG/L
VENTRICULAR RATE- MUSE: 110 BPM
WBC # BLD AUTO: 4.2 10E3/UL (ref 4–11)

## 2025-03-11 PROCEDURE — 85004 AUTOMATED DIFF WBC COUNT: CPT | Performed by: EMERGENCY MEDICINE

## 2025-03-11 PROCEDURE — 82077 ASSAY SPEC XCP UR&BREATH IA: CPT | Performed by: EMERGENCY MEDICINE

## 2025-03-11 PROCEDURE — 96375 TX/PRO/DX INJ NEW DRUG ADDON: CPT

## 2025-03-11 PROCEDURE — 80053 COMPREHEN METABOLIC PANEL: CPT | Performed by: EMERGENCY MEDICINE

## 2025-03-11 PROCEDURE — 99285 EMERGENCY DEPT VISIT HI MDM: CPT | Mod: 25

## 2025-03-11 PROCEDURE — 85014 HEMATOCRIT: CPT | Performed by: EMERGENCY MEDICINE

## 2025-03-11 PROCEDURE — 85379 FIBRIN DEGRADATION QUANT: CPT | Performed by: EMERGENCY MEDICINE

## 2025-03-11 PROCEDURE — 84703 CHORIONIC GONADOTROPIN ASSAY: CPT | Performed by: EMERGENCY MEDICINE

## 2025-03-11 PROCEDURE — 71275 CT ANGIOGRAPHY CHEST: CPT

## 2025-03-11 PROCEDURE — 250N000009 HC RX 250: Performed by: EMERGENCY MEDICINE

## 2025-03-11 PROCEDURE — 250N000011 HC RX IP 250 OP 636: Performed by: EMERGENCY MEDICINE

## 2025-03-11 PROCEDURE — 96361 HYDRATE IV INFUSION ADD-ON: CPT

## 2025-03-11 PROCEDURE — 96365 THER/PROPH/DIAG IV INF INIT: CPT | Mod: 59

## 2025-03-11 PROCEDURE — 84484 ASSAY OF TROPONIN QUANT: CPT | Performed by: EMERGENCY MEDICINE

## 2025-03-11 PROCEDURE — 258N000003 HC RX IP 258 OP 636: Performed by: EMERGENCY MEDICINE

## 2025-03-11 PROCEDURE — 93005 ELECTROCARDIOGRAM TRACING: CPT

## 2025-03-11 PROCEDURE — 36415 COLL VENOUS BLD VENIPUNCTURE: CPT | Performed by: EMERGENCY MEDICINE

## 2025-03-11 PROCEDURE — 83735 ASSAY OF MAGNESIUM: CPT | Performed by: EMERGENCY MEDICINE

## 2025-03-11 PROCEDURE — 250N000013 HC RX MED GY IP 250 OP 250 PS 637: Performed by: EMERGENCY MEDICINE

## 2025-03-11 PROCEDURE — 82040 ASSAY OF SERUM ALBUMIN: CPT | Performed by: EMERGENCY MEDICINE

## 2025-03-11 RX ORDER — CHLORDIAZEPOXIDE HYDROCHLORIDE 25 MG/1
25 CAPSULE, GELATIN COATED ORAL 3 TIMES DAILY PRN
Qty: 14 CAPSULE | Refills: 0 | Status: SHIPPED | OUTPATIENT
Start: 2025-03-11

## 2025-03-11 RX ORDER — KETOROLAC TROMETHAMINE 15 MG/ML
15 INJECTION, SOLUTION INTRAMUSCULAR; INTRAVENOUS ONCE
Status: COMPLETED | OUTPATIENT
Start: 2025-03-11 | End: 2025-03-11

## 2025-03-11 RX ORDER — MAGNESIUM SULFATE HEPTAHYDRATE 40 MG/ML
2 INJECTION, SOLUTION INTRAVENOUS ONCE
Status: COMPLETED | OUTPATIENT
Start: 2025-03-11 | End: 2025-03-11

## 2025-03-11 RX ORDER — DIPHENHYDRAMINE HYDROCHLORIDE 50 MG/ML
25 INJECTION, SOLUTION INTRAMUSCULAR; INTRAVENOUS ONCE
Status: COMPLETED | OUTPATIENT
Start: 2025-03-11 | End: 2025-03-11

## 2025-03-11 RX ORDER — IOPAMIDOL 755 MG/ML
500 INJECTION, SOLUTION INTRAVASCULAR ONCE
Status: COMPLETED | OUTPATIENT
Start: 2025-03-11 | End: 2025-03-11

## 2025-03-11 RX ORDER — DIAZEPAM 10 MG/2ML
10 INJECTION, SOLUTION INTRAMUSCULAR; INTRAVENOUS ONCE
Status: COMPLETED | OUTPATIENT
Start: 2025-03-11 | End: 2025-03-11

## 2025-03-11 RX ORDER — DIAZEPAM 5 MG/1
10 TABLET ORAL ONCE
Status: COMPLETED | OUTPATIENT
Start: 2025-03-11 | End: 2025-03-11

## 2025-03-11 RX ORDER — HALOPERIDOL 5 MG/ML
5 INJECTION INTRAMUSCULAR ONCE
Status: COMPLETED | OUTPATIENT
Start: 2025-03-11 | End: 2025-03-11

## 2025-03-11 RX ORDER — ACETAMINOPHEN 500 MG
1000 TABLET ORAL ONCE
Status: COMPLETED | OUTPATIENT
Start: 2025-03-11 | End: 2025-03-11

## 2025-03-11 RX ADMIN — IOPAMIDOL 62 ML: 755 INJECTION, SOLUTION INTRAVENOUS at 20:29

## 2025-03-11 RX ADMIN — SODIUM CHLORIDE 1000 ML: 0.9 INJECTION, SOLUTION INTRAVENOUS at 22:57

## 2025-03-11 RX ADMIN — SODIUM CHLORIDE 84 ML: 9 INJECTION, SOLUTION INTRAVENOUS at 20:30

## 2025-03-11 RX ADMIN — ACETAMINOPHEN 1000 MG: 500 TABLET, FILM COATED ORAL at 22:56

## 2025-03-11 RX ADMIN — DIAZEPAM 10 MG: 5 TABLET ORAL at 22:57

## 2025-03-11 RX ADMIN — SODIUM CHLORIDE, POTASSIUM CHLORIDE, SODIUM LACTATE AND CALCIUM CHLORIDE 1000 ML: 600; 310; 30; 20 INJECTION, SOLUTION INTRAVENOUS at 20:15

## 2025-03-11 RX ADMIN — DIAZEPAM 10 MG: 5 INJECTION INTRAMUSCULAR; INTRAVENOUS at 20:15

## 2025-03-11 RX ADMIN — HALOPERIDOL LACTATE 5 MG: 5 INJECTION, SOLUTION INTRAMUSCULAR at 21:38

## 2025-03-11 RX ADMIN — MAGNESIUM SULFATE HEPTAHYDRATE 2 G: 40 INJECTION, SOLUTION INTRAVENOUS at 21:10

## 2025-03-11 RX ADMIN — KETOROLAC TROMETHAMINE 15 MG: 15 INJECTION, SOLUTION INTRAMUSCULAR; INTRAVENOUS at 22:57

## 2025-03-11 RX ADMIN — DIPHENHYDRAMINE HYDROCHLORIDE 25 MG: 50 INJECTION, SOLUTION INTRAMUSCULAR; INTRAVENOUS at 21:38

## 2025-03-11 ASSESSMENT — COLUMBIA-SUICIDE SEVERITY RATING SCALE - C-SSRS
2. HAVE YOU ACTUALLY HAD ANY THOUGHTS OF KILLING YOURSELF IN THE PAST MONTH?: NO
1. IN THE PAST MONTH, HAVE YOU WISHED YOU WERE DEAD OR WISHED YOU COULD GO TO SLEEP AND NOT WAKE UP?: NO
6. HAVE YOU EVER DONE ANYTHING, STARTED TO DO ANYTHING, OR PREPARED TO DO ANYTHING TO END YOUR LIFE?: NO

## 2025-03-11 ASSESSMENT — ACTIVITIES OF DAILY LIVING (ADL)
ADLS_ACUITY_SCORE: 56

## 2025-03-11 NOTE — ED PROVIDER NOTES
"  Emergency Department Note      History of Present Illness     Chief Complaint   Alcohol Problem      HPI     Jacquie Henderson is a 34 year old female who presents for concern of alcohol withdrawal. The patient reports feeling shaky and developing constant chest pain with associated nausea and shortness of breath which began 2 hours prior to arrival.  She suspects symptoms are due to alcohol withdrawal. Jacquie has been drinking for the past 3 days; her last drink was about 5 hours ago. She states vodka is her liquor of choice, usually going through 1 liter of vodka in 2 days. She was last seen at the ED for alcohol related symptoms on 02/03, but notes she is feeling worst today. She had a recent trip to [place] within the past 4 weeks. She denies history of cancer,, estrogen use, DVT and PE.     Independent Historian   None    Review of External Notes   I reviewed discharge summary from 2/27/2025 in which patient was admitted for alcohol abuse with subsequent alcohol withdrawal and starvation ketosis.  She was hypomagnesemic and hypocalcemic.  She was evaluated by psychiatry and placed on sertraline.    Past Medical History     Medical History and Problem List   Alcohol withdrawal  Migraines  PTSD  Anxiety  Depression  Graves' disease  Fibromyalgia  Rheumatoid arthritis    Medications   Neurontin  Antabuse  Esgic    Physical Exam     Patient Vitals for the past 24 hrs:   BP Temp Temp src Pulse Resp SpO2 Height Weight   03/11/25 2214 111/69 -- -- 96 -- -- -- --   03/11/25 2159 115/73 -- -- 93 -- -- -- --   03/11/25 2136 131/84 -- -- 112 -- -- -- --   03/11/25 2115 (!) 140/98 -- -- 101 -- -- -- --   03/11/25 2019 127/85 -- -- 112 -- 96 % -- --   03/11/25 1823 (!) 149/105 97.5  F (36.4  C) Temporal 117 17 97 % 1.702 m (5' 7\") 62.9 kg (138 lb 10.7 oz)     Physical Exam      HEENT:    Oropharynx is moist  Eyes:    Conjunctiva normal  Neck:     Supple, no meningismus.     CV:     Tachycardic, regular rhythm.      No " murmurs, rubs or gallops.       No unilateral leg swelling.       2+ radial pulses bilateral.       No lower extremity edema.  PULM:    Clear to auscultation bilateral.       No respiratory distress.      Good air exchange.     No rales or wheezing.     No stridor.  ABD:    Soft, non-tender, non-distended.       No pulsatile masses.       No rebound, guarding or rigidity.  MSK:     No gross deformity to all four extremities.   LYMPH:   No cervical lymphadenopathy.  NEURO:   Alert & O x 3  Speech is clear  Fine resting tremor to the upper extremities   Good muscle tone, no atrophy.  Skin:    Warm, dry and intact.    Psych:    Anxious     Cooperative      Diagnostics     Lab Results   Labs Ordered and Resulted from Time of ED Arrival to Time of ED Departure   COMPREHENSIVE METABOLIC PANEL - Abnormal       Result Value    Sodium 142      Potassium 4.2      Carbon Dioxide (CO2) 22      Anion Gap 24 (*)     Urea Nitrogen 7.5      Creatinine 0.67      GFR Estimate >90      Calcium 9.2      Chloride 96 (*)     Glucose 78      Alkaline Phosphatase 71      AST 44      ALT 27      Protein Total 8.5 (*)     Albumin 5.0      Bilirubin Total 0.4     MAGNESIUM - Abnormal    Magnesium 1.6 (*)    ETHYL ALCOHOL LEVEL - Abnormal    Alcohol ethyl 0.26 (*)    D DIMER QUANTITATIVE - Abnormal    D-Dimer Quantitative 0.88 (*)    HCG QUALITATIVE PREGNANCY - Normal    hCG Serum Qualitative Negative     TROPONIN T, HIGH SENSITIVITY - Normal    Troponin T, High Sensitivity <6     CBC WITH PLATELETS AND DIFFERENTIAL    WBC Count 4.2      RBC Count 4.45      Hemoglobin 13.5      Hematocrit 39.3      MCV 88      MCH 30.3      MCHC 34.4      RDW 12.2      Platelet Count 314      % Neutrophils 61      % Lymphocytes 30      % Monocytes 7      % Eosinophils 1      % Basophils 1      % Immature Granulocytes 1      NRBCs per 100 WBC 0      Absolute Neutrophils 2.6      Absolute Lymphocytes 1.3      Absolute Monocytes 0.3      Absolute Eosinophils 0.1       Absolute Basophils 0.1      Absolute Immature Granulocytes 0.0      Absolute NRBCs 0.0         Imaging   CT Chest Pulmonary Embolism w Contrast   Final Result   IMPRESSION:   1.  No evidence for pulmonary emboli.   2.  No evidence for acute pulmonary disease.          EKG   ECG taken at 1819, ECG read at 1857  Sinus tachycardia   Otherwise normal ECG   No significant changes as compared to prior, dated 02/22/25.  Rate 110 bpm. [place] interval 126 ms. QRS duration 88 ms. QT/QTc 334/452 ms. P-R-T axes 78 87 63.    Independent Interpretation   None    ED Course      Medications Administered   Medications   sodium chloride 0.9% BOLUS 1,000 mL (1,000 mLs Intravenous $New Bag 3/11/25 2257)   lactated ringers BOLUS 1,000 mL (0 mLs Intravenous Stopped 3/11/25 2227)   diazepam (VALIUM) injection 10 mg (10 mg Intravenous $Given 3/11/25 2015)   iopamidol (ISOVUE-370) solution 500 mL (62 mLs Intravenous $Given 3/11/25 2029)   CT Scan Flush (84 mLs Intravenous $Given 3/11/25 2030)   magnesium sulfate 2 g in 50 mL sterile water intermittent infusion (0 g Intravenous Stopped 3/11/25 2227)   haloperidol lactate (HALDOL) injection 5 mg (5 mg Intravenous $Given 3/11/25 2138)   diphenhydrAMINE (BENADRYL) injection 25 mg (25 mg Intravenous $Given 3/11/25 2138)   ketorolac (TORADOL) injection 15 mg (15 mg Intravenous $Given 3/11/25 2257)   acetaminophen (TYLENOL) tablet 1,000 mg (1,000 mg Oral $Given 3/11/25 2256)   diazepam (VALIUM) tablet 10 mg (10 mg Oral $Given 3/11/25 2257)       Procedures   Procedures     Discussion of Management   None    ED Course   ED Course as of 03/11/25 2325   Tue Mar 11, 2025   1845 I obtained the history and examined the patient as noted above.         Additional Documentation  None    Medical Decision Making / Diagnosis     CMS Diagnoses: None    MIPS   CT for PE was ordered because the patient had an abnormal d-dimer.    ROSENDO Henderson is a 34 year old female with a history of recurrent alcohol  abuse presents with concerns of alcohol withdrawal and chest pain.  In regards to her chest pain, EKG without ischemic changes.  Troponin within normal limits.  No indication for serial enzymes.  Low suspicion for PE but did have presenting tachycardia and recent travel.  D-dimer was elevated thus underwent CT scan of the chest which is negative for acute intrathoracic pathology to account for symptoms including PE.  She has a history of chest pain with alcohol-related issues likely in part due to alcohol withdrawal and/or anxiety.    She was also concerned that she was in alcohol withdrawal.  She was tachycardic and tremulous but alcohol level was elevated at 0.24.  I believe there is a significant degree of anxiety with a component of mild alcohol withdrawal.  She had remarkable improvement with interventions as described above.  Vital signs have normalized.  I offered her transfer to detox but she was not interested.  I do not believe that she requires hospitalization at this time.  She is safe for discharge with alcohol cessation, Librium as needed for withdrawal symptoms and close follow-up with PCP.    Disposition   The patient was discharged.     Diagnosis     ICD-10-CM    1. Alcohol abuse  F10.10       2. Alcohol withdrawal, with unspecified complication (H)  F10.939       3. Hypomagnesemia  E83.42       4. Chest pain, unspecified type  R07.9            Discharge Medications   New Prescriptions    No medications on file     Scribe Disclosure:  I, Airam Coyne, am serving as a scribe at 6:49 PM on 3/11/2025 to document services personally performed by Xavier Perez MD based on my observations and the provider's statements to me.        Xavier Perez MD  03/11/25 9344

## 2025-03-11 NOTE — ED TRIAGE NOTES
"Pt complains of alcohol withdrawal. Pt states her last drink was 4-5 hrs ago after a 2-3 day binge. Pt complains of sob, chest tightness, \"shakiness.\" Pt complains of hx of withdrawal symptoms. No seizure withdrawal hx.         "

## 2025-03-12 VITALS
DIASTOLIC BLOOD PRESSURE: 73 MMHG | HEIGHT: 67 IN | OXYGEN SATURATION: 96 % | HEART RATE: 102 BPM | BODY MASS INDEX: 21.76 KG/M2 | WEIGHT: 138.67 LBS | TEMPERATURE: 97.5 F | SYSTOLIC BLOOD PRESSURE: 126 MMHG | RESPIRATION RATE: 17 BRPM

## 2025-03-12 LAB
ATRIAL RATE - MUSE: 110 BPM
DIASTOLIC BLOOD PRESSURE - MUSE: NORMAL MMHG
INTERPRETATION ECG - MUSE: NORMAL
P AXIS - MUSE: 78 DEGREES
PR INTERVAL - MUSE: 126 MS
QRS DURATION - MUSE: 88 MS
QT - MUSE: 334 MS
QTC - MUSE: 452 MS
R AXIS - MUSE: 87 DEGREES
SYSTOLIC BLOOD PRESSURE - MUSE: NORMAL MMHG
T AXIS - MUSE: 63 DEGREES
VENTRICULAR RATE- MUSE: 110 BPM

## 2025-03-30 ENCOUNTER — TELEPHONE (OUTPATIENT)
Dept: BEHAVIORAL HEALTH | Facility: CLINIC | Age: 35
End: 2025-03-30

## 2025-03-30 ENCOUNTER — HOSPITAL ENCOUNTER (INPATIENT)
Facility: CLINIC | Age: 35
LOS: 2 days | Discharge: HOME | DRG: 897 | End: 2025-04-01
Attending: EMERGENCY MEDICINE | Admitting: PSYCHIATRY & NEUROLOGY
Payer: COMMERCIAL

## 2025-03-30 DIAGNOSIS — R07.89 CHEST TIGHTNESS: Primary | ICD-10-CM

## 2025-03-30 DIAGNOSIS — F10.239 ALCOHOL DEPENDENCE WITH WITHDRAWAL WITH COMPLICATION (H): ICD-10-CM

## 2025-03-30 DIAGNOSIS — F10.939 ALCOHOL WITHDRAWAL SYNDROME WITH COMPLICATION (H): ICD-10-CM

## 2025-03-30 DIAGNOSIS — F10.930 ALCOHOL WITHDRAWAL SYNDROME WITHOUT COMPLICATION (H): ICD-10-CM

## 2025-03-30 DIAGNOSIS — F10.10 ALCOHOL ABUSE: ICD-10-CM

## 2025-03-30 DIAGNOSIS — F10.930 ALCOHOL WITHDRAWAL, UNCOMPLICATED (H): ICD-10-CM

## 2025-03-30 LAB
ALBUMIN SERPL BCG-MCNC: 5.1 G/DL (ref 3.5–5.2)
ALCOHOL BREATH TEST: 0.16 (ref 0–0.01)
ALP SERPL-CCNC: 67 U/L (ref 40–150)
ALT SERPL W P-5'-P-CCNC: 17 U/L (ref 0–50)
AMPHETAMINES UR QL SCN: NORMAL
ANION GAP SERPL CALCULATED.3IONS-SCNC: 21 MMOL/L (ref 7–15)
AST SERPL W P-5'-P-CCNC: 31 U/L (ref 0–45)
BARBITURATES UR QL SCN: NORMAL
BASOPHILS # BLD AUTO: 0.1 10E3/UL (ref 0–0.2)
BASOPHILS NFR BLD AUTO: 1 %
BENZODIAZ UR QL SCN: NORMAL
BILIRUB SERPL-MCNC: 0.5 MG/DL
BUN SERPL-MCNC: 10.7 MG/DL (ref 6–20)
BZE UR QL SCN: NORMAL
CALCIUM SERPL-MCNC: 9.5 MG/DL (ref 8.8–10.4)
CANNABINOIDS UR QL SCN: NORMAL
CHLORIDE SERPL-SCNC: 93 MMOL/L (ref 98–107)
CREAT SERPL-MCNC: 0.65 MG/DL (ref 0.51–0.95)
EGFRCR SERPLBLD CKD-EPI 2021: >90 ML/MIN/1.73M2
EOSINOPHIL # BLD AUTO: 0 10E3/UL (ref 0–0.7)
EOSINOPHIL NFR BLD AUTO: 0 %
ERYTHROCYTE [DISTWIDTH] IN BLOOD BY AUTOMATED COUNT: 11.9 % (ref 10–15)
EST. AVERAGE GLUCOSE BLD GHB EST-MCNC: 82 MG/DL
FENTANYL UR QL: NORMAL
GGT SERPL-CCNC: 23 U/L (ref 5–36)
GLUCOSE SERPL-MCNC: 188 MG/DL (ref 70–99)
HBA1C MFR BLD: 4.5 %
HCG UR QL: NEGATIVE
HCO3 SERPL-SCNC: 21 MMOL/L (ref 22–29)
HCT VFR BLD AUTO: 41.7 % (ref 35–47)
HGB BLD-MCNC: 14.7 G/DL (ref 11.7–15.7)
HOLD SPECIMEN: NORMAL
IMM GRANULOCYTES # BLD: 0 10E3/UL
IMM GRANULOCYTES NFR BLD: 0 %
LYMPHOCYTES # BLD AUTO: 1.5 10E3/UL (ref 0.8–5.3)
LYMPHOCYTES NFR BLD AUTO: 23 %
MAGNESIUM SERPL-MCNC: 1.8 MG/DL (ref 1.7–2.3)
MCH RBC QN AUTO: 31.3 PG (ref 26.5–33)
MCHC RBC AUTO-ENTMCNC: 35.3 G/DL (ref 31.5–36.5)
MCV RBC AUTO: 89 FL (ref 78–100)
MONOCYTES # BLD AUTO: 0.3 10E3/UL (ref 0–1.3)
MONOCYTES NFR BLD AUTO: 4 %
NEUTROPHILS # BLD AUTO: 4.6 10E3/UL (ref 1.6–8.3)
NEUTROPHILS NFR BLD AUTO: 72 %
NRBC # BLD AUTO: 0 10E3/UL
NRBC BLD AUTO-RTO: 0 /100
OPIATES UR QL SCN: NORMAL
PCP QUAL URINE (ROCHE): NORMAL
PLATELET # BLD AUTO: 289 10E3/UL (ref 150–450)
POTASSIUM SERPL-SCNC: 4 MMOL/L (ref 3.4–5.3)
PROT SERPL-MCNC: 8.9 G/DL (ref 6.4–8.3)
RBC # BLD AUTO: 4.69 10E6/UL (ref 3.8–5.2)
SODIUM SERPL-SCNC: 135 MMOL/L (ref 135–145)
TROPONIN T SERPL HS-MCNC: <6 NG/L
TSH SERPL DL<=0.005 MIU/L-ACNC: 0.78 UIU/ML (ref 0.3–4.2)
WBC # BLD AUTO: 6.4 10E3/UL (ref 4–11)

## 2025-03-30 PROCEDURE — 250N000011 HC RX IP 250 OP 636: Performed by: PSYCHIATRY & NEUROLOGY

## 2025-03-30 PROCEDURE — 84443 ASSAY THYROID STIM HORMONE: CPT | Performed by: PSYCHIATRY & NEUROLOGY

## 2025-03-30 PROCEDURE — 85018 HEMOGLOBIN: CPT | Performed by: EMERGENCY MEDICINE

## 2025-03-30 PROCEDURE — 250N000013 HC RX MED GY IP 250 OP 250 PS 637: Performed by: PSYCHIATRY & NEUROLOGY

## 2025-03-30 PROCEDURE — 99285 EMERGENCY DEPT VISIT HI MDM: CPT | Performed by: EMERGENCY MEDICINE

## 2025-03-30 PROCEDURE — 80307 DRUG TEST PRSMV CHEM ANLYZR: CPT | Performed by: EMERGENCY MEDICINE

## 2025-03-30 PROCEDURE — 80053 COMPREHEN METABOLIC PANEL: CPT | Performed by: EMERGENCY MEDICINE

## 2025-03-30 PROCEDURE — HZ2ZZZZ DETOXIFICATION SERVICES FOR SUBSTANCE ABUSE TREATMENT: ICD-10-PCS | Performed by: PSYCHIATRY & NEUROLOGY

## 2025-03-30 PROCEDURE — 81025 URINE PREGNANCY TEST: CPT | Performed by: EMERGENCY MEDICINE

## 2025-03-30 PROCEDURE — 99285 EMERGENCY DEPT VISIT HI MDM: CPT | Mod: 25 | Performed by: EMERGENCY MEDICINE

## 2025-03-30 PROCEDURE — 82075 ASSAY OF BREATH ETHANOL: CPT | Performed by: EMERGENCY MEDICINE

## 2025-03-30 PROCEDURE — 128N000004 HC R&B CD ADULT

## 2025-03-30 PROCEDURE — 85025 COMPLETE CBC W/AUTO DIFF WBC: CPT | Performed by: EMERGENCY MEDICINE

## 2025-03-30 PROCEDURE — 250N000013 HC RX MED GY IP 250 OP 250 PS 637: Performed by: EMERGENCY MEDICINE

## 2025-03-30 PROCEDURE — 250N000011 HC RX IP 250 OP 636: Performed by: EMERGENCY MEDICINE

## 2025-03-30 PROCEDURE — 83036 HEMOGLOBIN GLYCOSYLATED A1C: CPT | Performed by: PSYCHIATRY & NEUROLOGY

## 2025-03-30 PROCEDURE — 82977 ASSAY OF GGT: CPT | Performed by: PSYCHIATRY & NEUROLOGY

## 2025-03-30 PROCEDURE — 83735 ASSAY OF MAGNESIUM: CPT | Performed by: EMERGENCY MEDICINE

## 2025-03-30 PROCEDURE — 36415 COLL VENOUS BLD VENIPUNCTURE: CPT | Performed by: EMERGENCY MEDICINE

## 2025-03-30 PROCEDURE — 82040 ASSAY OF SERUM ALBUMIN: CPT | Performed by: EMERGENCY MEDICINE

## 2025-03-30 PROCEDURE — 84484 ASSAY OF TROPONIN QUANT: CPT | Performed by: EMERGENCY MEDICINE

## 2025-03-30 RX ORDER — LOPERAMIDE HYDROCHLORIDE 2 MG/1
2 CAPSULE ORAL 4 TIMES DAILY PRN
Status: DISCONTINUED | OUTPATIENT
Start: 2025-03-30 | End: 2025-04-01 | Stop reason: HOSPADM

## 2025-03-30 RX ORDER — ONDANSETRON 4 MG/1
4 TABLET, ORALLY DISINTEGRATING ORAL ONCE
Status: COMPLETED | OUTPATIENT
Start: 2025-03-30 | End: 2025-03-30

## 2025-03-30 RX ORDER — DIAZEPAM 5 MG/1
5-20 TABLET ORAL EVERY 30 MIN PRN
Status: DISCONTINUED | OUTPATIENT
Start: 2025-03-30 | End: 2025-04-01

## 2025-03-30 RX ORDER — FLUMAZENIL 0.1 MG/ML
0.2 INJECTION, SOLUTION INTRAVENOUS
Status: DISCONTINUED | OUTPATIENT
Start: 2025-03-30 | End: 2025-03-31

## 2025-03-30 RX ORDER — FOLIC ACID 1 MG/1
1 TABLET ORAL DAILY
Status: DISCONTINUED | OUTPATIENT
Start: 2025-03-30 | End: 2025-04-01 | Stop reason: HOSPADM

## 2025-03-30 RX ORDER — ATENOLOL 50 MG/1
50 TABLET ORAL DAILY PRN
Status: DISCONTINUED | OUTPATIENT
Start: 2025-03-30 | End: 2025-04-01

## 2025-03-30 RX ORDER — OLANZAPINE 10 MG/1
10 TABLET ORAL 3 TIMES DAILY PRN
Status: DISCONTINUED | OUTPATIENT
Start: 2025-03-30 | End: 2025-04-01 | Stop reason: HOSPADM

## 2025-03-30 RX ORDER — TRAZODONE HYDROCHLORIDE 50 MG/1
50 TABLET ORAL
Status: DISCONTINUED | OUTPATIENT
Start: 2025-03-30 | End: 2025-03-31

## 2025-03-30 RX ORDER — OLANZAPINE 10 MG/2ML
10 INJECTION, POWDER, FOR SOLUTION INTRAMUSCULAR 3 TIMES DAILY PRN
Status: DISCONTINUED | OUTPATIENT
Start: 2025-03-30 | End: 2025-04-01 | Stop reason: HOSPADM

## 2025-03-30 RX ORDER — DIAZEPAM 10 MG/2ML
5-10 INJECTION, SOLUTION INTRAMUSCULAR; INTRAVENOUS EVERY 30 MIN PRN
Status: DISCONTINUED | OUTPATIENT
Start: 2025-03-30 | End: 2025-03-30

## 2025-03-30 RX ORDER — FOLIC ACID 1 MG/1
1 TABLET ORAL DAILY
Status: DISCONTINUED | OUTPATIENT
Start: 2025-03-30 | End: 2025-03-30

## 2025-03-30 RX ORDER — MAGNESIUM HYDROXIDE/ALUMINUM HYDROXICE/SIMETHICONE 120; 1200; 1200 MG/30ML; MG/30ML; MG/30ML
30 SUSPENSION ORAL EVERY 4 HOURS PRN
Status: DISCONTINUED | OUTPATIENT
Start: 2025-03-30 | End: 2025-04-01 | Stop reason: HOSPADM

## 2025-03-30 RX ORDER — AMOXICILLIN 250 MG
1 CAPSULE ORAL 2 TIMES DAILY PRN
Status: DISCONTINUED | OUTPATIENT
Start: 2025-03-30 | End: 2025-04-01 | Stop reason: HOSPADM

## 2025-03-30 RX ORDER — ACETAMINOPHEN 325 MG/1
650 TABLET ORAL EVERY 4 HOURS PRN
Status: DISCONTINUED | OUTPATIENT
Start: 2025-03-30 | End: 2025-04-01 | Stop reason: HOSPADM

## 2025-03-30 RX ORDER — FLUMAZENIL 0.1 MG/ML
0.2 INJECTION, SOLUTION INTRAVENOUS
Status: DISCONTINUED | OUTPATIENT
Start: 2025-03-30 | End: 2025-03-30

## 2025-03-30 RX ORDER — ONDANSETRON 4 MG/1
4 TABLET, ORALLY DISINTEGRATING ORAL EVERY 6 HOURS PRN
Status: DISCONTINUED | OUTPATIENT
Start: 2025-03-30 | End: 2025-04-01 | Stop reason: HOSPADM

## 2025-03-30 RX ORDER — DIAZEPAM 5 MG/1
10 TABLET ORAL EVERY 30 MIN PRN
Status: DISCONTINUED | OUTPATIENT
Start: 2025-03-30 | End: 2025-03-30

## 2025-03-30 RX ORDER — MULTIPLE VITAMINS W/ MINERALS TAB 9MG-400MCG
1 TAB ORAL DAILY
Status: DISCONTINUED | OUTPATIENT
Start: 2025-03-30 | End: 2025-04-01 | Stop reason: HOSPADM

## 2025-03-30 RX ORDER — HYDROXYZINE HYDROCHLORIDE 25 MG/1
25 TABLET, FILM COATED ORAL EVERY 4 HOURS PRN
Status: DISCONTINUED | OUTPATIENT
Start: 2025-03-30 | End: 2025-04-01 | Stop reason: HOSPADM

## 2025-03-30 RX ORDER — ACETAMINOPHEN 325 MG/1
650 TABLET ORAL ONCE
Status: COMPLETED | OUTPATIENT
Start: 2025-03-30 | End: 2025-03-30

## 2025-03-30 RX ADMIN — DIAZEPAM 10 MG: 10 TABLET ORAL at 17:01

## 2025-03-30 RX ADMIN — ACETAMINOPHEN 650 MG: 325 TABLET, FILM COATED ORAL at 16:10

## 2025-03-30 RX ADMIN — ONDANSETRON 4 MG: 4 TABLET, ORALLY DISINTEGRATING ORAL at 14:32

## 2025-03-30 RX ADMIN — FOLIC ACID 1 MG: 1 TABLET ORAL at 14:32

## 2025-03-30 RX ADMIN — TRAZODONE HYDROCHLORIDE 50 MG: 50 TABLET ORAL at 21:21

## 2025-03-30 RX ADMIN — DIAZEPAM 10 MG: 10 TABLET ORAL at 16:10

## 2025-03-30 RX ADMIN — ONDANSETRON 4 MG: 4 TABLET, ORALLY DISINTEGRATING ORAL at 20:20

## 2025-03-30 RX ADMIN — HYDROXYZINE HYDROCHLORIDE 25 MG: 25 TABLET, FILM COATED ORAL at 20:20

## 2025-03-30 RX ADMIN — THIAMINE HCL TAB 100 MG 100 MG: 100 TAB at 14:32

## 2025-03-30 RX ADMIN — DIAZEPAM 10 MG: 5 TABLET ORAL at 20:20

## 2025-03-30 RX ADMIN — DIAZEPAM 10 MG: 10 TABLET ORAL at 18:42

## 2025-03-30 RX ADMIN — DIAZEPAM 10 MG: 10 TABLET ORAL at 15:25

## 2025-03-30 RX ADMIN — Medication 1 TABLET: at 14:32

## 2025-03-30 ASSESSMENT — LIFESTYLE VARIABLES
HEADACHE, FULLNESS IN HEAD: MILD
VISUAL DISTURBANCES: MILD SENSITIVITY
AGITATION: 2
TREMOR: 3
TREMOR: 3
HEADACHE, FULLNESS IN HEAD: VERY MILD
HEADACHE, FULLNESS IN HEAD: MODERATE
NAUSEA AND VOMITING: NO NAUSEA AND NO VOMITING
AUDITORY DISTURBANCES: VERY MILD HARSHNESS OR ABILITY TO FRIGHTEN
NAUSEA AND VOMITING: NO NAUSEA AND NO VOMITING
NAUSEA AND VOMITING: MILD NAUSEA WITH NO VOMITING
AGITATION: 2
AUDITORY DISTURBANCES: NOT PRESENT
PAROXYSMAL SWEATS: BARELY PERCEPTIBLE SWEATING, PALMS MOIST
VISUAL DISTURBANCES: VERY MILD SENSITIVITY
ORIENTATION AND CLOUDING OF SENSORIUM: ORIENTED AND CAN DO SERIAL ADDITIONS
TOTAL SCORE: 14
ANXIETY: 5
ORIENTATION AND CLOUDING OF SENSORIUM: ORIENTED AND CAN DO SERIAL ADDITIONS
ORIENTATION AND CLOUDING OF SENSORIUM: ORIENTED AND CAN DO SERIAL ADDITIONS
TREMOR: 3
VISUAL DISTURBANCES: NOT PRESENT
HEADACHE, FULLNESS IN HEAD: VERY MILD
PAROXYSMAL SWEATS: BARELY PERCEPTIBLE SWEATING, PALMS MOIST
VISUAL DISTURBANCES: VERY MILD SENSITIVITY
TREMOR: 2
ORIENTATION AND CLOUDING OF SENSORIUM: ORIENTED AND CAN DO SERIAL ADDITIONS
AUDITORY DISTURBANCES: VERY MILD HARSHNESS OR ABILITY TO FRIGHTEN
AGITATION: 2
ANXIETY: 3
NAUSEA AND VOMITING: NO NAUSEA AND NO VOMITING
TOTAL SCORE: 12
PAROXYSMAL SWEATS: BARELY PERCEPTIBLE SWEATING, PALMS MOIST
AGITATION: 2
TOTAL SCORE: 14
ANXIETY: MODERATELY ANXIOUS, OR GUARDED, SO ANXIETY IS INFERRED
AUDITORY DISTURBANCES: NOT PRESENT
PAROXYSMAL SWEATS: BARELY PERCEPTIBLE SWEATING, PALMS MOIST
ANXIETY: 3
TOTAL SCORE: 12

## 2025-03-30 ASSESSMENT — ACTIVITIES OF DAILY LIVING (ADL)
ADLS_ACUITY_SCORE: 30
ADLS_ACUITY_SCORE: 56
HYGIENE/GROOMING: INDEPENDENT
ADLS_ACUITY_SCORE: 56
ADLS_ACUITY_SCORE: 30
ADLS_ACUITY_SCORE: 56
ADLS_ACUITY_SCORE: 30
ADLS_ACUITY_SCORE: 56

## 2025-03-30 NOTE — CONSULTS
Brief Psychiatry Note   Chart and labs reviewed. Patient meets criteria for 3A detox admission to detox from alcohol. Orders for admission placed.     Most Recent 2 LFT's:  Recent Labs   Lab Test 03/30/25  1357 03/11/25  1950   AST 31 44   ALT 17 27   ALKPHOS 67 71   BILITOTAL 0.5 0.4       Dorothea Tolbert MD

## 2025-03-30 NOTE — TELEPHONE ENCOUNTER
S: [facility] , Provider   calling at 2:39 PM with clinical on a 34 year old/Female presenting for alcohol detox.     B: Pt presents for ETOH detox.     Currently reports drinking  about 8 drinks / day for  a couple of weeks.   She states she has had binge drinking behavior for a couple of years..    Patient reports last use was  at night.  Pt ESTEPHANIA: .16  Pt  denies hx of DT  Pt  denies hx of seizures. Last seizure: N/A  Pt endorsing the following symptoms of withdrawal: Shaky, very anxious  MSSA Score:     Pt denies acute mental health or medical concerns.   Pt denies other drug use: (!) ALCOHOL Amount/frequency:  See above    Does Pt have a detox care plan in [facility]?  no  Does pt present with specific needs, assistive devices, or exclusionary criteria? None  Is the patient ambulating, eating and drinking in the ED?  yes    A: Pt meets criteria to be presented for IP detox admission. Patient is voluntary    COVID Symptoms: No  If yes, COVID test required   Utox: Negative  Magnesium: WNL  CMP: Abnormalities: see chart  CBC: Abnormalities: see chart  HCG: Negative     R: Patient cleared and ready for behavioral bed placement: Yes    Pt is meeting criteria for presentation to 3A/CD    Does Patient need a Transfer Center request created? No, Pt is located within [facility] ED, [facility], or [place] [facility]       R:  Admit to 3A  Straight CD/Detox       Vol  Per  Dorothea Paris  3:19 PM   MR [id number] accepted to 3A/detox Dr Sandoval    3:40  3A CRN informed.  3:44  ED informed  There are pts in the Q before her and unit is waiting on staff also    3:53 PM Indicia done

## 2025-03-30 NOTE — ED NOTES
Pt here for detox of alcohol. States she has never been to detox before, but has come into the hospital for withdrawls in the past. Pt denies hx of seizures, hallucinations, and DT's.

## 2025-03-30 NOTE — PHARMACY-ADMISSION MEDICATION HISTORY
Pharmacist Admission Medication History    Admission medication history is complete. The information provided in this note is only as accurate as the sources available at the time of the update.    Medication reconciliation/reorder completed by provider prior to medication history? no    Information Source(s): patient at bedside and prescription fill history     Pertinent Information:   Reivewed PTA medications with pt. Mostly OTC/supplements but pt reports she discontinued sertraline several weeks ago but is interested in restarting.      Changes made to PTA medication list:  Added: None  Deleted: Several completed medications including gabapentin taper and chlordiazepoxide  Changed: none    Allergies reviewed with patient and updates made in EHR: no    Prior to Admission medications    Medication Sig Last Dose       folic acid (FOLVITE) 1 MG tablet Take 1 tablet (1 mg) by mouth daily.     Past Month       melatonin 5 MG tablet Take 5 mg by mouth nightly as needed for sleep     Past Month       multivitamin (ONE-DAILY) tablet Take 1 tablet by mouth daily     Past Month       oxymetazoline (AFRIN) 0.05 % nasal spray Spray 0.2 mLs (2 sprays) into both nostrils 2 times daily as needed for congestion or other (nosebleed).     Past Month       sertraline (ZOLOFT) 25 MG tablet Take 1 tablet (25 mg) by mouth daily.     Past Month       thiamine (B-1) 100 MG tablet Take 1 tablet (100 mg) by mouth daily.     Past Month          Alina Bridges, Pharm.D., [facility]  Inpatient Psychiatric Pharmacist  [facility] ([facility])  Contact via [employer] or Epic Messaging

## 2025-03-30 NOTE — ED PROVIDER NOTES
[facility] EMERGENCY DEPARTMENT ([facility])    3/30/25      ED PROVIDER NOTE    History     Chief Complaint   Patient presents with    Alcohol Intoxication     Pt states she binges alcohol for about a year.  States she binges when she has time off.  No history of seizures.  Her for detox with her uncle.     SARAHI Henderson is a 34 year old female with a past medical history of alcohol use disorder who presents to the emergency department for alcohol detox.  She reports periods of binge drinking over the last couple years, drinking up to 8 drinks daily; she states she drinks mostly wine.  She states that she has gone through withdrawal.  Denies history of withdrawal seizures or DTs.  Notes using THC a couple times a year but denies other substance use.  Denies acute mental health concerns.  Currently patient states that she feels nauseous, shaky, and not like herself.  She also notes some chest tightness which she states that she has had before when she is gone through withdrawal.  No vomiting.  No recent illness, fever.    Past Medical History  Past Medical History:   Diagnosis Date    Anxiety     Substance abuse (H)      No past surgical history on file.  butalbital-acetaminophen-caffeine (ESGIC) -40 MG tablet  Collagen-Vitamin C-Biotin (COLLAGEN PO)  folic acid (FOLVITE) 1 MG tablet  melatonin 5 MG tablet  multivitamin (ONE-DAILY) tablet  ondansetron (ZOFRAN) 4 MG tablet  OVER-THE-COUNTER  oxymetazoline (AFRIN) 0.05 % nasal spray  Probiotic Product (PROBIOTIC BLEND) CAPS  sertraline (ZOLOFT) 25 MG tablet  thiamine (B-1) 100 MG tablet      Allergies   Allergen Reactions    Gluten Meal GI Disturbance     Family History  No family history on file.  Social History   Social History     Tobacco Use    Smoking status: Never    Smokeless tobacco: Never   Substance Use Topics    Alcohol use: Yes     Comment: binges    Drug use: Yes     Types: Marijuana     Comment: States she uses gummies      A  "medically appropriate review of systems was performed with pertinent positives and negatives noted in the HPI, and all other systems negative.    Physical Exam   BP: (!) 149/99  Pulse: 117  Temp: 98.4  F (36.9  C)  Resp: 16  Height: 170.2 cm (5' 7\")  Weight: 62.8 kg (138 lb 6.4 oz)  SpO2: 98 %  Physical Exam  Constitutional:       General: She is not in acute distress.     Appearance: She is not toxic-appearing.   HENT:      Head: Normocephalic and atraumatic.      Nose: Nose normal.      Mouth/Throat:      Mouth: Mucous membranes are moist.      Pharynx: Oropharynx is clear.   Eyes:      Conjunctiva/sclera: Conjunctivae normal.      Pupils: Pupils are equal, round, and reactive to light.   Cardiovascular:      Rate and Rhythm: Regular rhythm. Tachycardia present.      Heart sounds: No murmur heard.  Pulmonary:      Effort: Pulmonary effort is normal. No respiratory distress.      Breath sounds: No wheezing or rales.   Musculoskeletal:         General: Normal range of motion.   Skin:     General: Skin is warm and dry.   Neurological:      General: No focal deficit present.      Mental Status: She is alert and oriented to person, place, and time.           ED Course, Procedures, & Data      Procedures            EKG Interpretation:      Interpreted by Shayan Henriquez MD  Time reviewed: 2:30  Symptoms at time of EKG: chest tightness   Rhythm: normal sinus   Rate: tachycardia  Axis: normal  Ectopy: none  Conduction: normal  ST Segments/ T Waves: No ST-T wave changes      Clinical Impression: normal EKG           Results for orders placed or performed during the hospital encounter of 03/30/25   HCG qualitative urine     Status: Normal   Result Value Ref Range    hCG Urine Qualitative Negative Negative   [place] Draw     Status: None    Narrative    The following orders were created for panel order [place] Draw.  Procedure                               Abnormality         Status                     ---------            "                    -----------         ------                     Extra Blue Top Tube[[id number]]                             Final result               Extra Red Top Tube[[id number]]                              Final result               Extra Green Top (Lithiu...[[id number]]                      Final result               Extra Purple Top Tube[[id number]]                           Final result                 Please view results for these tests on the individual orders.   Extra Blue Top Tube     Status: None   Result Value Ref Range    Hold Specimen JIC    Extra Red Top Tube     Status: None   Result Value Ref Range    Hold Specimen JIC    Extra Green Top (Lithium Heparin) Tube     Status: None   Result Value Ref Range    Hold Specimen JIC    Extra Purple Top Tube     Status: None   Result Value Ref Range    Hold Specimen JIC    Comprehensive metabolic panel     Status: Abnormal   Result Value Ref Range    Sodium 135 135 - 145 mmol/L    Potassium 4.0 3.4 - 5.3 mmol/L    Carbon Dioxide (CO2) 21 (L) 22 - 29 mmol/L    Anion Gap 21 (H) 7 - 15 mmol/L    Urea Nitrogen 10.7 6.0 - 20.0 mg/dL    Creatinine 0.65 0.51 - 0.95 mg/dL    GFR Estimate >90 >60 mL/min/1.73m2    Calcium 9.5 8.8 - 10.4 mg/dL    Chloride 93 (L) 98 - 107 mmol/L    Glucose 188 (H) 70 - 99 mg/dL    Alkaline Phosphatase 67 40 - 150 U/L    AST 31 0 - 45 U/L    ALT 17 0 - 50 U/L    Protein Total 8.9 (H) 6.4 - 8.3 g/dL    Albumin 5.1 3.5 - 5.2 g/dL    Bilirubin Total 0.5 <=1.2 mg/dL   Magnesium     Status: Normal   Result Value Ref Range    Magnesium 1.8 1.7 - 2.3 mg/dL   Troponin T, High Sensitivity     Status: Normal   Result Value Ref Range    Troponin T, High Sensitivity <6 <=14 ng/L   CBC with platelets and differential     Status: None   Result Value Ref Range    WBC Count 6.4 4.0 - 11.0 10e3/uL    RBC Count 4.69 3.80 - 5.20 10e6/uL    Hemoglobin 14.7 11.7 - 15.7 g/dL    Hematocrit 41.7 35.0 - 47.0 %    MCV 89 78 - 100 fL    MCH 31.3 26.5 - 33.0 pg     MCHC 35.3 31.5 - 36.5 g/dL    RDW 11.9 10.0 - 15.0 %    Platelet Count 289 150 - 450 10e3/uL    % Neutrophils 72 %    % Lymphocytes 23 %    % Monocytes 4 %    % Eosinophils 0 %    % Basophils 1 %    % Immature Granulocytes 0 %    NRBCs per 100 WBC 0 <1 /100    Absolute Neutrophils 4.6 1.6 - 8.3 10e3/uL    Absolute Lymphocytes 1.5 0.8 - 5.3 10e3/uL    Absolute Monocytes 0.3 0.0 - 1.3 10e3/uL    Absolute Eosinophils 0.0 0.0 - 0.7 10e3/uL    Absolute Basophils 0.1 0.0 - 0.2 10e3/uL    Absolute Immature Granulocytes 0.0 <=0.4 10e3/uL    Absolute NRBCs 0.0 10e3/uL   Urine Drug Screen Panel     Status: Normal   Result Value Ref Range    Amphetamines Urine Screen Negative Screen Negative    Barbituates Urine Screen Negative Screen Negative    Benzodiazepine Urine Screen Negative Screen Negative    Cannabinoids Urine Screen Negative Screen Negative    Cocaine Urine Screen Negative Screen Negative    Fentanyl Qual Urine Screen Negative Screen Negative    Opiates Urine Screen Negative Screen Negative    PCP Urine Screen Negative Screen Negative   Alcohol breath test POCT     Status: Abnormal   Result Value Ref Range    Alcohol Breath Test 0.160 (A) 0.00 - 0.01   Urine Drug Screen     Status: Normal    Narrative    The following orders were created for panel order Urine Drug Screen.  Procedure                               Abnormality         Status                     ---------                               -----------         ------                     Urine Drug Screen Panel[[id number]]     Normal              Final result                 Please view results for these tests on the individual orders.   CBC with platelets differential     Status: None    Narrative    The following orders were created for panel order CBC with platelets differential.  Procedure                               Abnormality         Status                     ---------                               -----------         ------                     CBC  with platelets and ...[[id number]]                      Final result                 Please view results for these tests on the individual orders.     Medications   flumazenil (ROMAZICON) injection 0.2 mg (has no administration in time range)   melatonin tablet 5 mg (has no administration in time range)   diazepam (VALIUM) tablet 10 mg (10 mg Oral $Given 3/30/25 1525)     Or   diazepam (VALIUM) injection 5-10 mg ( Intravenous See Alternative 3/30/25 1525)   multivitamin w/minerals (THERA-VIT-M) tablet 1 tablet (1 tablet Oral $Given 3/30/25 1432)   thiamine (B-1) tablet 100 mg (100 mg Oral $Given 3/30/25 1432)   folic acid (FOLVITE) tablet 1 mg (1 mg Oral $Given 3/30/25 1432)   ondansetron (ZOFRAN ODT) ODT tab 4 mg (4 mg Oral $Given 3/30/25 1432)     Labs Ordered and Resulted from Time of ED Arrival to Time of ED Departure   COMPREHENSIVE METABOLIC PANEL - Abnormal       Result Value    Sodium 135      Potassium 4.0      Carbon Dioxide (CO2) 21 (*)     Anion Gap 21 (*)     Urea Nitrogen 10.7      Creatinine 0.65      GFR Estimate >90      Calcium 9.5      Chloride 93 (*)     Glucose 188 (*)     Alkaline Phosphatase 67      AST 31      ALT 17      Protein Total 8.9 (*)     Albumin 5.1      Bilirubin Total 0.5     ALCOHOL BREATH TEST POCT - Abnormal    Alcohol Breath Test 0.160 (*)    HCG QUALITATIVE URINE - Normal    hCG Urine Qualitative Negative     MAGNESIUM - Normal    Magnesium 1.8     TROPONIN T, HIGH SENSITIVITY - Normal    Troponin T, High Sensitivity <6     URINE DRUG SCREEN PANEL - Normal    Amphetamines Urine Screen Negative      Barbituates Urine Screen Negative      Benzodiazepine Urine Screen Negative      Cannabinoids Urine Screen Negative      Cocaine Urine Screen Negative      Fentanyl Qual Urine Screen Negative      Opiates Urine Screen Negative      PCP Urine Screen Negative     CBC WITH PLATELETS AND DIFFERENTIAL    WBC Count 6.4      RBC Count 4.69      Hemoglobin 14.7      Hematocrit 41.7       MCV 89      MCH 31.3      MCHC 35.3      RDW 11.9      Platelet Count 289      % Neutrophils 72      % Lymphocytes 23      % Monocytes 4      % Eosinophils 0      % Basophils 1      % Immature Granulocytes 0      NRBCs per 100 WBC 0      Absolute Neutrophils 4.6      Absolute Lymphocytes 1.5      Absolute Monocytes 0.3      Absolute Eosinophils 0.0      Absolute Basophils 0.1      Absolute Immature Granulocytes 0.0      Absolute NRBCs 0.0       No orders to display          Critical care was not performed.     Medical Decision Making  The patient's presentation was of high complexity (a chronic illness severe exacerbation, progression, or side effect of treatment).    The patient's evaluation involved:  review of external note(s) from 3+ sources (ED note, clinic note, discharge summary)  review of 3+ test result(s) ordered prior to this encounter (CBC, BMP, alcohol level,)  ordering and/or review of 3+ test(s) in this encounter (see separate area of note for details)  discussion of management or test interpretation with another health professional (see separate area of note for details)    The patient's management necessitated high risk (a decision regarding hospitalization).    Assessment & Plan    This is a 34-year-old female presenting seeking detox.  Patient was tachycardic and hypertensive on arrival.  Alcohol level was 0.16 by breathalyzer.  Screening blood work was obtained and did not show other acute process.  She did have some chest tightness and EKG was done which showed sinus tachycardia but no acute ischemic findings and troponin was undetectable as well.  She was started on the MSSA protocol and I discussed her with detox intake to admit her to detox.    I have reviewed the nursing notes. I have reviewed the findings, diagnosis, plan and need for follow up with the patient.    New Prescriptions    No medications on file       Final diagnoses:   Alcohol withdrawal, uncomplicated (H)         [facility]  [facility] EMERGENCY DEPARTMENT  3/30/2025     Shayan Henriquez MD  03/30/25 1538

## 2025-03-31 PROBLEM — F10.239 ALCOHOL DEPENDENCE WITH WITHDRAWAL WITH COMPLICATION (H): Status: ACTIVE | Noted: 2025-03-31

## 2025-03-31 LAB
ANION GAP SERPL CALCULATED.3IONS-SCNC: 11 MMOL/L (ref 7–15)
ATRIAL RATE - MUSE: 112 BPM
BUN SERPL-MCNC: 11.4 MG/DL (ref 6–20)
CALCIUM SERPL-MCNC: 9.8 MG/DL (ref 8.8–10.4)
CHLORIDE SERPL-SCNC: 97 MMOL/L (ref 98–107)
CREAT SERPL-MCNC: 0.79 MG/DL (ref 0.51–0.95)
DIASTOLIC BLOOD PRESSURE - MUSE: NORMAL MMHG
EGFRCR SERPLBLD CKD-EPI 2021: >90 ML/MIN/1.73M2
GLUCOSE SERPL-MCNC: 120 MG/DL (ref 70–99)
HCO3 SERPL-SCNC: 30 MMOL/L (ref 22–29)
HOLD SPECIMEN: NORMAL
INTERPRETATION ECG - MUSE: NORMAL
P AXIS - MUSE: 66 DEGREES
POTASSIUM SERPL-SCNC: 4.3 MMOL/L (ref 3.4–5.3)
PR INTERVAL - MUSE: 124 MS
QRS DURATION - MUSE: 92 MS
QT - MUSE: 340 MS
QTC - MUSE: 464 MS
R AXIS - MUSE: 67 DEGREES
SODIUM SERPL-SCNC: 138 MMOL/L (ref 135–145)
SYSTOLIC BLOOD PRESSURE - MUSE: NORMAL MMHG
T AXIS - MUSE: 35 DEGREES
VENTRICULAR RATE- MUSE: 112 BPM

## 2025-03-31 PROCEDURE — 128N000004 HC R&B CD ADULT

## 2025-03-31 PROCEDURE — 250N000013 HC RX MED GY IP 250 OP 250 PS 637: Performed by: PSYCHIATRY & NEUROLOGY

## 2025-03-31 PROCEDURE — 90853 GROUP PSYCHOTHERAPY: CPT

## 2025-03-31 PROCEDURE — 84132 ASSAY OF SERUM POTASSIUM: CPT | Performed by: PSYCHIATRY & NEUROLOGY

## 2025-03-31 PROCEDURE — 36415 COLL VENOUS BLD VENIPUNCTURE: CPT | Performed by: PSYCHIATRY & NEUROLOGY

## 2025-03-31 PROCEDURE — 99222 1ST HOSP IP/OBS MODERATE 55: CPT | Performed by: STUDENT IN AN ORGANIZED HEALTH CARE EDUCATION/TRAINING PROGRAM

## 2025-03-31 PROCEDURE — 80048 BASIC METABOLIC PNL TOTAL CA: CPT | Performed by: PSYCHIATRY & NEUROLOGY

## 2025-03-31 PROCEDURE — 82565 ASSAY OF CREATININE: CPT | Performed by: PSYCHIATRY & NEUROLOGY

## 2025-03-31 PROCEDURE — 99223 1ST HOSP IP/OBS HIGH 75: CPT | Performed by: PSYCHIATRY & NEUROLOGY

## 2025-03-31 RX ORDER — SERTRALINE HYDROCHLORIDE 25 MG/1
25 TABLET, FILM COATED ORAL DAILY
Status: DISCONTINUED | OUTPATIENT
Start: 2025-03-31 | End: 2025-04-01 | Stop reason: HOSPADM

## 2025-03-31 RX ORDER — TRAZODONE HYDROCHLORIDE 50 MG/1
50 TABLET ORAL
Status: DISCONTINUED | OUTPATIENT
Start: 2025-03-31 | End: 2025-04-01 | Stop reason: HOSPADM

## 2025-03-31 RX ORDER — OXYMETAZOLINE HYDROCHLORIDE 0.05 G/100ML
2 SPRAY NASAL 2 TIMES DAILY PRN
Status: DISCONTINUED | OUTPATIENT
Start: 2025-03-31 | End: 2025-04-01 | Stop reason: HOSPADM

## 2025-03-31 RX ORDER — MULTIPLE VITAMINS W/ MINERALS TAB 9MG-400MCG
1 TAB ORAL DAILY
Qty: 30 TABLET | Refills: 0 | Status: SHIPPED | OUTPATIENT
Start: 2025-04-01

## 2025-03-31 RX ORDER — FOLIC ACID 1 MG/1
1 TABLET ORAL DAILY
Qty: 30 TABLET | Refills: 0 | Status: SHIPPED | OUTPATIENT
Start: 2025-03-31

## 2025-03-31 RX ORDER — LANOLIN ALCOHOL/MO/W.PET/CERES
100 CREAM (GRAM) TOPICAL DAILY
Qty: 30 TABLET | Refills: 0 | Status: SHIPPED | OUTPATIENT
Start: 2025-03-31

## 2025-03-31 RX ORDER — DISULFIRAM 250 MG/1
250 TABLET ORAL DAILY
Qty: 30 TABLET | Refills: 2 | Status: SHIPPED | OUTPATIENT
Start: 2025-03-31

## 2025-03-31 RX ADMIN — THIAMINE HCL TAB 100 MG 100 MG: 100 TAB at 08:53

## 2025-03-31 RX ADMIN — FOLIC ACID 1 MG: 1 TABLET ORAL at 08:53

## 2025-03-31 RX ADMIN — TRAZODONE HYDROCHLORIDE 50 MG: 50 TABLET ORAL at 21:17

## 2025-03-31 RX ADMIN — Medication 5 MG: at 21:16

## 2025-03-31 RX ADMIN — ACETAMINOPHEN 650 MG: 325 TABLET, FILM COATED ORAL at 08:53

## 2025-03-31 RX ADMIN — ACETAMINOPHEN 650 MG: 325 TABLET, FILM COATED ORAL at 20:21

## 2025-03-31 RX ADMIN — Medication 1 TABLET: at 08:53

## 2025-03-31 RX ADMIN — SERTRALINE HYDROCHLORIDE 25 MG: 25 TABLET ORAL at 08:53

## 2025-03-31 RX ADMIN — DIAZEPAM 10 MG: 5 TABLET ORAL at 00:16

## 2025-03-31 ASSESSMENT — ACTIVITIES OF DAILY LIVING (ADL)
ADLS_ACUITY_SCORE: 30
ADLS_ACUITY_SCORE: 30
HYGIENE/GROOMING: INDEPENDENT
ADLS_ACUITY_SCORE: 30
ADLS_ACUITY_SCORE: 30
LAUNDRY: WITH SUPERVISION
LAUNDRY: WITH SUPERVISION
ADLS_ACUITY_SCORE: 30
ORAL_HYGIENE: INDEPENDENT
ADLS_ACUITY_SCORE: 30
DRESS: INDEPENDENT
ADLS_ACUITY_SCORE: 30
ADLS_ACUITY_SCORE: 30
HYGIENE/GROOMING: INDEPENDENT
ORAL_HYGIENE: INDEPENDENT
ADLS_ACUITY_SCORE: 30
DRESS: SCRUBS (BEHAVIORAL HEALTH)
ADLS_ACUITY_SCORE: 30

## 2025-03-31 NOTE — PLAN OF CARE
Problem: Alcohol Withdrawal  Goal: Alcohol Withdrawal Symptom Control  Outcome: Progressing     Problem: Sleep Disturbance  Goal: Adequate Sleep/Rest  Outcome: Progressing   Goal Outcome Evaluation:    The Patient's MSSA scores were 9 and 6. She received Valium once to manage alcohol withdrawal symptoms. The Team conducted safety checks every 15 minutes, and there were no issues.

## 2025-03-31 NOTE — CONSULTS
[facility]  Internal Medicine Consult     Jacquie Henderson MRN# [id number]   Age: 34 year old YOB: 1990     Date of Admission: 3/30/2025  Date of Consult: 3/31/2025    Primary Care Provider: Carey Ramos    Requesting Service: Behavioral Health - Vanna Sandoval MD  Reason for Consult: General Medical Evaluation      SUBJECTIVE   CC:   Alcohol Withdrawal    Assessment and Plan/Recommendations:     Jacquie Henderson is a 34 year old female with PMHx significant for anxiety and alcohol use disorder.  Patient was admitted to station 3A for detox. Internal medicine consulted for medical co-management.    # Alcohol withdrawal, hx of alcohol use disorder   # Metabolic acidosis secondary alcohol use  MSSA 6-9 this shift.  Denies hx of withdrawal seizures. Pt reports binge drinking behavior for the past couple of years, up to 8 drinks daily.  She drinks mostly wine.  Blood EtOH on arrival was 0.16.  Anion gap elevated to 21.  Liver function tests are normal.  She is already on thiamine, folate, multivitamin.  - Continue MSSA   - Folvite, multi-vites, thiamine supplementation   - Further management per Psychiatry   - Home med list reviewed.    # Anxiety  PTA med list includes sertraline  - Management per psychiatry        Currently, medically stable and internal medicine will sign off. Please contact if future questions or concerns arise. Thank you for the opportunity to be a part of this patient's care.       Elmer Travis PA-C  Internal Medicine [facility] Hospitalist  Page job code 6020 (3B), [phone] (3A), or [phone] ([facility] and )  Text paging via [employer] is appreciated  March 31, 2025         HPI:     Patient resting comfortably in bed on my exam.  Overall reports feeling well, better than expected.  Has minimal tremors or headache.  No fevers chills or night sweats.  No abdominal pain, nausea, vomiting.  We discussed current LFTs which are looking okay.  Patient reports she works  "at [employer] and has some medical background.         Past Medical History:     Past Medical History:   Diagnosis Date    Anxiety     Substance abuse (H)         Reviewed and updated in AppSheet.     Past Surgical History:    No past surgical history on file.      Social History:     Social History     Tobacco Use    Smoking status: Never    Smokeless tobacco: Never   Substance Use Topics    Alcohol use: Yes     Comment: binges    Drug use: Yes     Types: Marijuana     Comment: States she uses gummies        Family History:   No family history on file.      Allergies:     Allergies   Allergen Reactions    Gluten Meal GI Disturbance         Medications:   Reviewed. Please see MAR     Review of Systems:   10 point ROS of systems including Constitutional, Eyes, Respiratory, Cardiovascular, Gastroenterology, Genitourinary, Integumentary, Muscularskeletal, Psychiatric were all negative except for pertinent positives noted in my HPI.    OBJECTIVE   Physical Exam:   Vitals were reviewed  Blood pressure 126/69, pulse 100, temperature 98.3  F (36.8  C), temperature source Oral, resp. rate 16, height 1.702 m (5' 7\"), weight 63.6 kg (140 lb 3.4 oz), last menstrual period 03/16/2025, SpO2 97%, not currently breastfeeding.  General: Alert and oriented x3. Somewhat tremulous   EYES: PERRLA, EOM. Anicteric   HENT: Atraumatic.   Lungs: No increased work of breathing. CTAB.   Cards: RRR, no m/r/g appreciated   GI: Abdomen soft, non-tender without rebound or guarding. + Bowel sounds.  Neurologic: No focal deficits, CN II-XII grossly intact  Skin: No jaundice, rashes, or lesions        Data:        Lab Results   Component Value Date     03/30/2025    Lab Results   Component Value Date    CHLORIDE 93 03/30/2025    Lab Results   Component Value Date    BUN 10.7 03/30/2025      Lab Results   Component Value Date    POTASSIUM 4.0 03/30/2025    Lab Results   Component Value Date    CO2 21 03/30/2025    Lab Results   Component " Value Date    CR 0.65 03/30/2025        Lab Results   Component Value Date    WBC 6.4 03/30/2025    HGB 14.7 03/30/2025    HCT 41.7 03/30/2025    MCV 89 03/30/2025     03/30/2025     Lab Results   Component Value Date    WBC 6.4 03/30/2025

## 2025-03-31 NOTE — PLAN OF CARE
"Patient visible in the milieu, social with peers, attended offered group. Affect bright, mood is mildly anxious. Declined medication intervention for anxiety. Denies SI, SIB, HI, or hallucinations.   Continues to be monitored for alcohol withdrawal. VSS, appetite is good, hydrating adequately. Denies pain.   1600 VS and MSSA 4: /89 (BP Location: Left arm)   Pulse 89   Temp 97.4  F (36.3  C) (Temporal)   Resp 16   Ht 1.702 m (5' 7\")   Wt 63.6 kg (140 lb 3.4 oz)   LMP 03/16/2025 (Approximate)   SpO2 98%   Breastfeeding No   BMI 21.96 kg/m     2000 VS and MSSA 3: /90 (BP Location: Left arm)   Pulse 82   Temp 97.7  F (36.5  C) (Oral)   Resp 16   Ht 1.702 m (5' 7\")   Wt 63.6 kg (140 lb 3.4 oz)   LMP 03/16/2025 (Approximate)   SpO2 99%   Breastfeeding No   BMI 21.96 kg/m     Patient plans to attend an [facility] CD program after she is discharged.   PRNS: Melatonin 5 mg times one for sleep and Trazodone 50 mg times one for sleep.  Denies any additional concerns or unmet needs.  Problem: Alcohol Withdrawal  Goal: Alcohol Withdrawal Symptom Control  Outcome: Progressing  Goal: Optimal Neurologic Function  Outcome: Progressing  Goal: Readiness for Change Identified  Outcome: Progressing   Goal Outcome Evaluation:    Plan of Care Reviewed With: patient                   "

## 2025-03-31 NOTE — PLAN OF CARE
"[facility] Admission Note  S = Situation:   Jacquie Henderson is a 34 year old year old female with a chief complaint of Alcohol Intoxication t Voluntary admission to [facility] for alcohol withdrawal and detox.  B  = Background:    Pt presented to the Ed for ETOH detox. Reports drinking  about 8 drinks / day for  a couple of weeks.   She states she has had binge drinking behavior for a couple of years.Pt ESTEPHANIA: .16  Pt  denies hx of DT.Pt  denies hx of seizures.Pt denies acute mental health or medical concerns.   Patient also reports Use of THC. Patient lives with grandmoter   A  =  Assessment:   Patient in moderate alcohol withdrawal on admission, cooperative with admission interview, pt affect was flat,mood anxious. Reports anxiety 8/10,depression 6/10 and nausea on admission. Pt denies SI/SIB/HI. Pt denied pain.  MSSA 8. Administered with 10 mg valium,hydroxyzine, Zofran and trazodone bed. Pt received atotal of 40 mg valium since coming to the Hospital    /87 (BP Location: Left arm, Patient Position: Sitting, Cuff Size: Adult Regular)   Pulse 118   Temp 98.7  F (37.1  C) (Oral)   Resp 18   Ht 1.702 m (5' 7\")   Wt 63.6 kg (140 lb 3.4 oz)   LMP 03/16/2025 (Approximate)   SpO2 99%   Breastfeeding No   BMI 21.96 kg/m       R =   Request or Recommendation:   Pt stated om MSSA protocol  with valium          "

## 2025-03-31 NOTE — PLAN OF CARE
Goal Outcome Evaluation:    Plan of Care Reviewed With: patient      Patient had a good shift, visible in milieu.  Flat affect, denies SI/SIB, rated depression 0/10, anxiety 2/10.  MSSA 7 and 4, no valium given this shift.  Good appetite, medication compliant.  Received prn tylenol this morning for headaches.  Claimed relieved afterward.  Patient report unwitnessed nose bleeds, requesting for afrin nasal spray,  MD notified.  New order for afrin nasal spray.  Patient currently watching T.V in the lounge area.  Will continue to monitor closely.

## 2025-03-31 NOTE — PROGRESS NOTES
[address]     Daily Encounter: Met with team, discussed patient progress, discharge plan and any impediments to discharge.    3/31/25: Patient is a 34 year old female presenting to  for alcohol detox. Patient reports periods of binge drinking over the last couple years, drinking up to 8 drinks daily, mostly wine. Writer met with patient and completed initial interview, [facility] Safety Plan, and discussed any need for assessment for placement. Patient reports she is interested in detox only at this time but would like some resources for grief counseling to look into on her own time. Writer provided resources for patient.    Insurance: [employer]     Legal Status:  Voluntary   County: N/A  File Number: N/A  Start and expiration date of commitment: N/A    SUDs Assessment Status: Patient declined    ROIs on file: None at this time     Living Situation: Lives independently     Current Providers, Supports & Collateral: None at this time     Current Plan/Referral Status: Detox only / grief counseling resources provided    Safety Plan Status:  [facility] Safety Plan completed      STAN Bergman  [facility] - Adult Inpatient Addiction Psychiatry Unit

## 2025-03-31 NOTE — H&P
Psychiatry History and Physical    Jacquie Henderson MRN# [id number]   Age: 34 year old YOB: 1990     Date of Admission:  3/30/2025          Assessment:   This patient is a 34 year old female with history of substance use and mood disorder who presented to ED seeking detox from alcohol. Medically cleared in ED, admitted to 3A as voluntary patient. Drinking wine and vodka daily, EtOH breath test 0.160(H), UDS negative. MSSA with diazepam started for alcohol withdrawal. Withdrawal precautions in place, denies history of seizures. Admission labs ordered and reviewed those resulted. IM consult placed. Denying safety concerns including SI and HI. PTA medications continued as appropriate, pt wanting to resume sertraline for mood, had not been taking recently and plans to resume antabuse on discharge to support sobriety. Pt reports goals for hospitalization are to complete detox, get referral for therapy to help process long standing trauma/grief and resume antabuse.      Inpatient psychiatric hospitalization is warranted at this time for safety, stabilization, and possible adjustment in medications.         Diagnoses:   Alcohol use disorder, severe, dependence with withdrawal with complication   Cannabis use, unspecified   Unspecified anxiety  Ongoing bereavement vs Adjustment disorder   R/O PTSD  High anion gap metabolic acidosis  Hypochloremia  Hyperglycemia  Tachycardia     Clinically Significant Risk Factors Present on Admission          # Hypochloremia: Lowest Cl = 93 mmol/L in last 2 days, will monitor as appropriate     # Anion Gap Metabolic Acidosis: Highest Anion Gap = 21 mmol/L in last 2 days, will monitor and treat as appropriate                                     Plan:   Psychiatric treatment/inteventions:  Medications:   -MSSA with diazepam, thiamine, folic acid, multivitamin and PRN atenolol for alcohol withdrawal   -restart sertraline 25mg daily for mood   -continue PTA melatonin at bedtime  for sleep PRN first line  -PRN hydroxyzine 25mg every 4 hours for anxiety   -PRN trazodone 50mg at bedtime for sleep, second line  -pt interested in resuming antabuse for AUD MAT on discharge, ordered  -patient interested in engaging with therapy on discharge and getting established with new PCP ongoing management of medications       Laboratory/Imaging: reviewed- EtOH breath test 0.160(H), UDS negative; CMP with Cl 93(L), CO2 21(L), ainon gap 21(H), total protein 8.9(H), glucose 188(H) otherwise WNL; Mg WNL; HgbA1c WNL; TSH WNL; HCG urine negative; CBC WNL; GGT WNL; EKG with sinus tachycardia and QTc 464ms; repeat BMP ordered to trend abnormalities     Patient will be treated in therapeutic milieu with appropriate individual and group therapies as described.    Medical treatment/interventions:  Medical concerns:   1) Alcohol withdrawal  -MSSA as above  -PRN zofran and imodium for GI distress related to withdrawal   -withdrawal precautions    2) IM consult placed for management of other medical concerns, per consult note on 3/31/25:   Jacquie Henderson is a 34 year old female with PMHx significant for anxiety and alcohol use disorder.  Patient was admitted to station 3A for detox. Internal medicine consulted for medical co-management.     # Alcohol withdrawal, hx of alcohol use disorder   # Metabolic acidosis secondary alcohol use  MSSA 6-9 this shift.  Denies hx of withdrawal seizures. Pt reports binge drinking behavior for the past couple of years, up to 8 drinks daily.  She drinks mostly wine.  Blood EtOH on arrival was 0.16.  Anion gap elevated to 21.  Liver function tests are normal.  She is already on thiamine, folate, multivitamin.  - Continue MSSA   - Folvite, multi-vites, thiamine supplementation   - Further management per Psychiatry   - Home med list reviewed.     # Anxiety  PTA med list includes sertraline  - Management per psychiatry           Currently, medically stable and internal medicine will sign  off. Please contact if future questions or concerns arise. Thank you for the opportunity to be a part of this patient's care.         Elmer Travis PA-C  Internal Medicine [facility] Hospitalist      Legal Status: Voluntary    Safety Assessment:   Behavioral Orders   Procedures    Code 1 - Restrict to Unit    Routine Programming     As clinically indicated    Status 15     Every 15 minutes.    Withdrawal precautions      Pt has not required locked seclusion or restraints in the past 24 hours to maintain safety, please refer to RN documentation for further details.    The risks, benefits, alternatives and side effects have been discussed and are understood by the patient.    Disposition: Pending clinical stabilization. Will likely discharge to home when stable.    >75 min total time that was spent in counseling and coordination of care with staff, reviewing medical record, educating patient about treatment options, side effects and benefits and alternative treatments for medications, providing supportive therapy and redirection regarding above symptoms.     This document is created with the help of Dragon dictation system.  All grammatical/typing errors or context distortion are unintentional and inherent to software.    Vanna Sandoval, [facility] Psychiatry         Chief Complaint:     Alcohol withdrawal          History of Present Illness:     Jacquie is a 34 year old female with history of substance use and mood disorder who presented to ED seeking detox from alcohol.    Chart review completed including ED notes from this encounter, medical admission for alcohol withdrawal 2/22/25 at [facility] also treated for starvation ketosis and hypomagnesemia and hypocalcemia and anemia, recommended pt follow up with cardiology in 2-3 weeks for recurrent syncope was started on sertraline for mood and IOP CD tx recommended; no previous admissions to 3A and no psychiatric admissions noted.     Per ED physician  "note: Jacquie Henderson is a 34 year old female with a past medical history of alcohol use disorder who presents to the emergency department for alcohol detox.  She reports periods of binge drinking over the last couple years, drinking up to 8 drinks daily; she states she drinks mostly wine.  She states that she has gone through withdrawal.  Denies history of withdrawal seizures or DTs.  Notes using THC a couple times a year but denies other substance use.  Denies acute mental health concerns.  Currently patient states that she feels nauseous, shaky, and not like herself.  She also notes some chest tightness which she states that she has had before when she is gone through withdrawal.  No vomiting.  No recent illness, fever.     Upon interview: Patient confirms information above, reports that she first her drinking alcohol when she was in high school and drink socially while in college.  She reports that her use escalated around the age of 27 due to some trauma and increased stressors including finding her mother  with her death being complicated from her alcohol use disorder.  She has had periods of sobriety on and off, tends to binge drink, reports in the last 2 years her binge drinking has definitely escalated.  She reports that she will get a lot of anxiety and does not feel like she is completely processed the trauma and grief she has experienced and will utilize alcohol as a coping mechanism.  She states that she will start drinking a little bit when she is feeling anxious and then she \"just does not stop\".  She will drink large quantities of wine and vodka.  She has tolerance to alcohol, drinks more intended, difficulty cutting down when she starts drinking and withdrawal symptoms when she stops including having some recent weakness, tiredness, increased heart rate, sweating tremor and headache.  Denies any history of seizures or DTs.  She also reports that she uses THC gummies 3 times a year and takes " "CBD and melatonin combination for sleep every night.  Denies any history of psychosis or current hallucinations.  Denies having any SI or HI.  Denies any history of suicide attempts or psychiatric admissions.  She states her mood has been \"fine\" and that is been relatively stable, she feels that she just, going through the motions.  She has taken sertraline in the past from her mood and wanted to be really helpful and wanting to resume this.  She also has utilized Antabuse to help support her sobriety and wanting to resume this on discharge as well.  She is not currently interested in going to CD treatment given the hours of her job and her greatly enjoying her work, she is open to engaging with individualized support through therapy to help her work through her trauma and grief and take Antabuse as previously stated.  She will discuss other options with case management staff as her stabilization progresses.            Psychiatric Review of Systems:   Depression:   Reports: low energy, poor sleep   Denies: depressed mood, suicidal ideation, decreased interest, changes in appetite, guilt, hopelessness, helplessness, impaired concentration, irritability.  Leslye:   Reports: none  Denies: sleeplessness, impulsiveness, racing thoughts, increased goal-directed activities, pressured speech, increase in energy  Psychosis:   Reports: none  Denies: visual hallucinations, auditory hallucinations, paranoia, grandiosity, ideas of reference, thought insertions, thought broadcasting.  Anxiety:   Reports: periods of increased anxiety   Denies:  panic attacks  PTSD:   Reports: hx of trauma .  OCD:   Reports: none  ED:   Reports: none         Medical Review of Systems:     10 point review of systems is otherwise negative unless noted above.            Psychiatric History:   Psychiatric Hospitalizations: denies  History of Psychosis: denies  Prior ECT: denies  Court Commitment: denies  Suicide Attempts: denies  Self-injurious " Behavior: denies  Violence toward others: denies  Use of Psychotropics: sertraline, antabuse, gabapentin          Substance Use History:   Alcohol: See HPI   Cannabis: see HPI  Nicotine: none  Cocaine: none  Methamphetamine: none  Opiates/Heroin: none  Benzodiazepines: none  Hallucinogens: none  Inhalants: none      Prior Chemical Dependency treatment: none         Social History:   Upbringing: born and raised in [facility] [place] [facility]  Educational History: some college  Relationships: single  Children: none  Current Living Situation: living with grandmother in [place], safe home  Occupational History: 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          Past Medical History:     Past Medical History:   Diagnosis Date    Anxiety     Substance abuse (H)        History of seizures: denies         Past Surgical History:   No past surgical history on file.           Allergies:      Allergies   Allergen Reactions    Gluten Meal GI Disturbance              Medications:   I have reviewed this patient's current medications  Medications Prior to Admission   Medication Sig Dispense Refill Last Dose/Taking    folic acid (FOLVITE) 1 MG tablet Take 1 tablet (1 mg) by mouth daily. 30 tablet 0 Past Month    melatonin 5 MG tablet Take 5 mg by mouth nightly as needed for sleep   Past Month    multivitamin (ONE-DAILY) tablet Take 1 tablet by mouth daily 90 tablet 3 Past Month    oxymetazoline (AFRIN) 0.05 % nasal spray Spray 0.2 mLs (2 sprays) into both nostrils 2 times daily as needed for congestion or other (nosebleed). 6 mL 0 Past Month    sertraline (ZOLOFT) 25 MG tablet Take 1  tablet (25 mg) by mouth daily. 30 tablet 0 Past Month    thiamine (B-1) 100 MG tablet Take 1 tablet (100 mg) by mouth daily. 30 tablet 0 Past Month             Labs:     Recent Results (from the past 24 hours)   Alcohol breath test POCT    Collection Time: 03/30/25  1:26 PM   Result Value Ref Range    Alcohol Breath Test 0.160 (A) 0.00 - 0.01   Extra Blue Top Tube    Collection Time: 03/30/25  1:57 PM   Result Value Ref Range    Hold Specimen JIC    Extra Red Top Tube    Collection Time: 03/30/25  1:57 PM   Result Value Ref Range    Hold Specimen JIC    Extra Green Top (Lithium Heparin) Tube    Collection Time: 03/30/25  1:57 PM   Result Value Ref Range    Hold Specimen JIC    Extra Purple Top Tube    Collection Time: 03/30/25  1:57 PM   Result Value Ref Range    Hold Specimen JIC    Comprehensive metabolic panel    Collection Time: 03/30/25  1:57 PM   Result Value Ref Range    Sodium 135 135 - 145 mmol/L    Potassium 4.0 3.4 - 5.3 mmol/L    Carbon Dioxide (CO2) 21 (L) 22 - 29 mmol/L    Anion Gap 21 (H) 7 - 15 mmol/L    Urea Nitrogen 10.7 6.0 - 20.0 mg/dL    Creatinine 0.65 0.51 - 0.95 mg/dL    GFR Estimate >90 >60 mL/min/1.73m2    Calcium 9.5 8.8 - 10.4 mg/dL    Chloride 93 (L) 98 - 107 mmol/L    Glucose 188 (H) 70 - 99 mg/dL    Alkaline Phosphatase 67 40 - 150 U/L    AST 31 0 - 45 U/L    ALT 17 0 - 50 U/L    Protein Total 8.9 (H) 6.4 - 8.3 g/dL    Albumin 5.1 3.5 - 5.2 g/dL    Bilirubin Total 0.5 <=1.2 mg/dL   Magnesium    Collection Time: 03/30/25  1:57 PM   Result Value Ref Range    Magnesium 1.8 1.7 - 2.3 mg/dL   Troponin T, High Sensitivity    Collection Time: 03/30/25  1:57 PM   Result Value Ref Range    Troponin T, High Sensitivity <6 <=14 ng/L   CBC with platelets and differential    Collection Time: 03/30/25  1:57 PM   Result Value Ref Range    WBC Count 6.4 4.0 - 11.0 10e3/uL    RBC Count 4.69 3.80 - 5.20 10e6/uL    Hemoglobin 14.7 11.7 - 15.7 g/dL    Hematocrit 41.7 35.0 - 47.0 %    MCV 89 78 - 100 fL     MCH 31.3 26.5 - 33.0 pg    MCHC 35.3 31.5 - 36.5 g/dL    RDW 11.9 10.0 - 15.0 %    Platelet Count 289 150 - 450 10e3/uL    % Neutrophils 72 %    % Lymphocytes 23 %    % Monocytes 4 %    % Eosinophils 0 %    % Basophils 1 %    % Immature Granulocytes 0 %    NRBCs per 100 WBC 0 <1 /100    Absolute Neutrophils 4.6 1.6 - 8.3 10e3/uL    Absolute Lymphocytes 1.5 0.8 - 5.3 10e3/uL    Absolute Monocytes 0.3 0.0 - 1.3 10e3/uL    Absolute Eosinophils 0.0 0.0 - 0.7 10e3/uL    Absolute Basophils 0.1 0.0 - 0.2 10e3/uL    Absolute Immature Granulocytes 0.0 <=0.4 10e3/uL    Absolute NRBCs 0.0 10e3/uL   GGT    Collection Time: 03/30/25  1:57 PM   Result Value Ref Range    GGT 23 5 - 36 U/L   Hemoglobin A1c    Collection Time: 03/30/25  1:57 PM   Result Value Ref Range    Estimated Average Glucose 82 <117 mg/dL    Hemoglobin A1C 4.5 <5.7 %   TSH with free T4 reflex    Collection Time: 03/30/25  1:57 PM   Result Value Ref Range    TSH 0.78 0.30 - 4.20 uIU/mL   EKG 12 lead    Collection Time: 03/30/25  2:01 PM   Result Value Ref Range    Systolic Blood Pressure  mmHg    Diastolic Blood Pressure  mmHg    Ventricular Rate 112 BPM    Atrial Rate 112 BPM    NY Interval 124 ms    QRS Duration 92 ms     ms    QTc 464 ms    P Axis 66 degrees    R AXIS 67 degrees    T Axis 35 degrees    Interpretation ECG       Sinus tachycardia  Possible Left atrial enlargement  Incomplete right bundle branch block  Borderline ECG  Unconfirmed report - interpretation of this ECG is computer generated - see medical record for final interpretation  Confirmed by - EMERGENCY ROOM, PHYSICIAN ([id number]),  LINDA WYATT ([id number]) on 3/31/2025 7:05:36 AM     HCG qualitative urine    Collection Time: 03/30/25  2:15 PM   Result Value Ref Range    hCG Urine Qualitative Negative Negative   Urine Drug Screen Panel    Collection Time: 03/30/25  2:15 PM   Result Value Ref Range    Amphetamines Urine Screen Negative Screen Negative    Barbituates Urine Screen  "Negative Screen Negative    Benzodiazepine Urine Screen Negative Screen Negative    Cannabinoids Urine Screen Negative Screen Negative    Cocaine Urine Screen Negative Screen Negative    Fentanyl Qual Urine Screen Negative Screen Negative    Opiates Urine Screen Negative Screen Negative    PCP Urine Screen Negative Screen Negative       /69 (BP Location: Left arm, Patient Position: Sitting, Cuff Size: Adult Regular)   Pulse 100   Temp 98.3  F (36.8  C) (Oral)   Resp 16   Ht 1.702 m (5' 7\")   Wt 63.6 kg (140 lb 3.4 oz)   LMP 03/16/2025 (Approximate)   SpO2 97%   Breastfeeding No   BMI 21.96 kg/m    Weight is 140 lbs 3.4 oz  Body mass index is 21.96 kg/m .         Psychiatric Mental Status Examination:   Appearance: awake, alert, adequately groomed  Attitude: cooperative and pleasant  Eye Contact: good  Mood:  \"fine\", some anxiety  Affect: mood congruent and full range  Speech:  clear, coherent and normal prosody  Language: fluent in English  Psychomotor Behavior:  no evidence of tardive dyskinesia, dystonia, or tics  Gait/Station: normal  Thought Process:  linear, logical, goal oriented  Associations:  no loose associations  Thought Content:  Denying SI/HI/AVH; no evidence of psychotic thinking  Insight:  fair  Judgement: intact  Oriented to:  time, person, and place  Attention Span and Concentration:  intact  Recent and Remote Memory:  intact  Fund of Knowledge: appropriate         Physical Exam:   Please refer to physical exam completed by ED provider, Shayan Henriquez MD, on 3/31/25 as below. I agree with the findings and assessment and have no additional findings to add at this time with exception that psychiatric findings now above in MSE.   Physical Exam  Constitutional:       General: She is not in acute distress.     Appearance: She is not toxic-appearing.   HENT:      Head: Normocephalic and atraumatic.      Nose: Nose normal.      Mouth/Throat:      Mouth: Mucous membranes are moist.      " Pharynx: Oropharynx is clear.   Eyes:      Conjunctiva/sclera: Conjunctivae normal.      Pupils: Pupils are equal, round, and reactive to light.   Cardiovascular:      Rate and Rhythm: Regular rhythm. Tachycardia present.      Heart sounds: No murmur heard.  Pulmonary:      Effort: Pulmonary effort is normal. No respiratory distress.      Breath sounds: No wheezing or rales.   Musculoskeletal:         General: Normal range of motion.   Skin:     General: Skin is warm and dry.   Neurological:      General: No focal deficit present.      Mental Status: She is alert and oriented to person, place, and time.

## 2025-03-31 NOTE — PLAN OF CARE
[facility]     Behavioral Team Discussion: (3/31/2025)    Continued Stay Criteria/Rationale: Patient admitted for Chemical Use Issues.  Plan: The following services will be provided to the patient; psychiatric assessment, medication management, therapeutic milieu, individual and group support, and skills groups.   Participants: 3A Provider: Vanna Sandoval MD ; 3A RN: Kathleen Post RN ; 3A LADC: LUIS ANGEL Betancourt  Summary/Recommendation: Providers will assess today for treatment recommendations, discharge planning, and aftercare plans. LADC will meet with pt for discharge planning.   Medical/Physical: None reported at this time  Precautions:   Behavioral Orders   Procedures    Code 1 - Restrict to Unit    Routine Programming     As clinically indicated    Status 15     Every 15 minutes.    Withdrawal precautions     Rationale for change in precautions or plan: N/A  Progress: Initial.    ASAM Dimension Scale Ratings:  Dimension 1: 3 Client tolerates and garry with withdrawal discomfort poorly. Client has severe intoxication, such that the client endangers self or others, or intoxication has not abated with less intensive levels of services. Client displays severe signs and symptoms; or risk of severe, but manageable withdrawal; or withdrawal worsening despite detox at less intensive level.  Dimension 2: 1 Client tolerates and garry with physical discomfort and is able to get the services that the client needs.  Dimension 3: 2 Client has difficulty with impulse control and lacks coping skills. Client has thoughts of suicide or harm to others without means; however, the thoughts may interfere with participation in some treatment activities. Client has difficulty functioning in significant life areas. Client has moderate symptoms of emotional, behavioral, or cognitive problems. Client is able to participate in most treatment activities.  Dimension 4: 2 Client displays verbal compliance, but lacks consistent  behaviors; has low motivation for change; and is passively involved in treatment.  Dimension 5: 4 No awareness of the negative impact of mental health problems or substance abuse. No coping skills to arrest mental health or addiction illnesses, or prevent relapse.  Dimension 6: 3 Client is not engaged in structured, meaningful activity and the client's peers, family, significant other, and living environment are unsupportive, or there is significant criminal justice system involvement.

## 2025-03-31 NOTE — PROGRESS NOTES
03/30/25 1931   Patient Belongings   Did you bring any home meds/supplements to the hospital?  No   Patient Belongings other (see comments)   Patient Belongings Put in Hospital Secure Location (Security or Locker, etc.) other (see comments)   Belongings Search Yes   Clothing Search Yes   Second Staff Jeanne Medina     STORAGE BIN:   Shoes, coat  MED ROOM BIN:   Purse, toiletries, cosmetics, [employer]# [id number]: id, ins  A             ADMISSION:    I am responsible for any personal items that are not sent to the safe or pharmacy. [facility] is not responsible for loss, theft or damage of any property in my possession.    Patient Signature _________________________________________ Date/Time _____________________    Staff Signature ___________________________________________ Date/Time _____________________    2nd Staff person, if patient is unable/unwilling to sign    ________________________________________________________ Date/Time _____________________    DISCHARGE:    All personal items have been returned to me.    Patient Signature _________________________________________ Date/Time _____________________    Staff Signature ___________________________________________ Date/Time _____________________

## 2025-03-31 NOTE — DISCHARGE INSTRUCTIONS
Behavioral Discharge Planning and Instructions  THANK YOU FOR CHOOSING [address]  [phone]    Summary: You were admitted to [facility] on 3/30/25 for detoxification from alcohol.  A medical exam was performed that included lab work. You have met with a [facility] Counselor and opted to release to home. Please take care and make your recovery a daily priority, Jacquie!  It was a pleasure working with you and the entire treatment team here wishes you the very best in your recovery!     Recommendation: It is recommended that you abstain from alcohol and other mood-altering chemicals. You have reported scheduling substance use treatment services independently at this time.    Main Diagnoses:    Vanna Sandoval MD  Attending  Alcohol use disorder, severe, dependence with withdrawal with complication   Cannabis use, unspecified   Unspecified anxiety  Ongoing bereavement vs Adjustment disorder   R/O PTSD  High anion gap metabolic acidosis  Hypochloremia  Hyperglycemia  Tachycardia    Major Treatments, Procedures and Findings:  You were treated for alcohol detoxification using valium. As an outpatient you will be prescribed medication, please take this medication as prescribed until it is gone. You have met with a [facility] counselor to develop a treatment plan for discharge. You had labs drawn and those results were reviewed with you. Please take a copy of your lab work with you to your next primary care provider appointment.    Symptoms to Report:  If you experience more anxiety, confusion, sleeplessness, deep sadness or thoughts of suicide, notify your treatment team or notify your primary care provider. IF ANY OF THE SYMPTOMS YOU ARE EXPERIENCING ARE A MEDICAL EMERGENCY CALL 911 IMMEDIATELY.     Lifestyle Adjustment: Adjust your lifestyle to get enough sleep, relaxation, exercise and good nutrition. Continue to develop healthy coping skills to decrease stress and promote a sober living environment. Do not use  mood altering substances including alcohol, illegal drugs or addictive medications other than what is currently prescribed.   Health Action Plan:  1.Create a daily schedule  2. Eat Healthy  3. Plan Enjoyable Sober Activities  4. Use Problem Solving Skills and Deal with Issues as they Arise.   5. Be Physically Active  6. Take your medications as prescribed  7. Get enough restful sleep  8. Practice Relaxation  9. Spend time with Supportive People  10. No use of alcohol, illegal drugs or addictive medications other than what is currently prescribed.   11.AA, NA Sponsor are excellent resources for support  12. Explore how  you can utilize spirituality in your recovery     Disposition: Released to home    Facts about COVID19 at www.cdc.gov/COVID19 and www.[place].gov/covid19    Keeping hands clean is one of the most important steps we can take to avoid getting sick and spreading germs to others.  Please wash your hands frequently and lather with soap for at least 20 seconds!    Follow-up Appointment:   Appointment: [facility]:  Provider Dr.Jen hemant Ramos    You are aware you should make a follow up appointment with your primary care doctor for medical and medication management    Appointment Date/Time: Thursday 4/3/25 at 1 pm      Provider: [facility]      Address: [address] [place] [address]      Phone Number: [phone]      Recovery apps for your phone for educational purposes and to locate in person and zoom recovery meetings  Everything AA -  víctor is a great resource  12 Step Toolkit - educational purpose learning about the 12 steps to recovery  [place] - meeting víctor  AA  - meeting víctor  Meeting guide - meeting víctor  Quick NA meeting - meeting víctor  Sarah- has various apps    Patient Navigation Hub  [facility] s Navigators work to be your point-of-contact for trustworthy and compassionate care from Inpatient services to [facility] s Programmatic  Care. We will provide resources and communication to help guide you into programmatic care. Ultimately, our goal is to be the one-stop-shop of communication, coordination, and support for your journey to programmatic care.  Phone: [phone]    Resources:  AA/NA meetings have returned to in-person or a hybrid combination of zoom/in-person therefore please check online to verify*  Need Support Now? If you or someone you know is struggling or in crisis, help is available. Call or text 873 or chat [employer].ZIPDIGS  AA meetings search for them at: https://aa-intergroup.org (worldwide meeting listings)  AA meetings for [place] area can be found online at: https://[place].org (click local online meetings listings)  NA meetings for [place] area can be found online at: https://www.nam[place].org  (click find a meeting)  AA and NA Sponsors are excellent resources for support and you can find one at any support group meeting.   Alcoholics Anonymous (https://aa.org/): for information 24 hours/day  AA Intergroup service office in [employer] (http://www.aastpaul.org/) 933.210.4244  AA Intergroup service office in [facility]: [phone]. (http://www.aaTVplusis.org/)  Narcotics Anonymous (www.nam[place].org) (243) 750-4467  https://aafairviewriverside.org/meetings  SMART Recovery - self management for addiction recovery:  www.smartrecovery.org  Pathways ~ A Health Crisis Resource & Support Center:  584.726.1919.  https://prescribetoprevent.org/patient-education/videos/  http://www.harmreduction.org  Crisis Text Line  Text 422240  You will be connected with a trained live crisis counselor to provide support. Por espanol, texto  MIKE a 024785 o texto a 442-AYUDAME en WhatsApp  [place] Hope Line  1.800.SUICIDE [3178168]  [facility] 784-422-0347  Support Group:  AA/NA and Sponsor/support.  Fast Tracker  Linking people to mental health and substance use disorder resources  [employer]   [place]  "Mental Health Warm Line  Peer to peer support  Monday thru Saturday, 12 pm to 10 pm  270.290.0030 or 2.528.739.6013  Text \"Support\" to 17651  National [place] on Mental Illness (QUINN)  451.509.0512 or 1888.QUINN.HELPS  Alcoholics Anonymous (www.alcoholics-anonymous.org): Check your phone book for your local chapter.  Suicide Awareness Voices of Education (SAVE) (www.save.org): 932-920-OHRY (3215)  National Suicide Prevention Line (www.mentalhealthmn.org): 143-343-NZEO (1624)  Mental Health Consumer/Survivor Network of [place] (www.mhcsn.net): 166.689.1371 or 440-523-8037  Mental Health Association of [place] (www.mentalhealth.org): 386.342.1246 or 150-571-2500   Substance Abuse and Mental Health Services (www.samhsa.gov)  [place] Opioid Prevention Coalition: www.opioidcoalition.org    [place] Recovery Connection ([facility])  [facility] connects people seeking recovery to resources that help foster and sustain long-term recovery.  Whether you are seeking resources for treatment, transportation, housing, job training, education, health care or other pathways to recovery, [facility] is a great place to start.  524.291.5997.  www.[place]Connection[place].org    Great Pod casts for nutrition and wellness  Listen on Apple Podcasts  Dishing Up Nutrition   [employer] [facility]   Nutrition       Understand the connection between what you eat and how you feel. Hosted by licensed nutritionists and dietitians from [employer] [facility] we share practical, real-life solutions for healthier living through nutrition.     General Medication Instructions:   See your medication sheet(s) for instructions.   Take all medications as prescribed.  Make no changes unless your primary care provider suggests them.   Go to all your primary care provider visits.  Be sure to have all your required lab tests. This way, your medicines can be refilled on time.  Do not use any forms of alcohol.    Please Note: If you have any questions at anytime after " you discharged please call [facility] detox unit [facility] at [phone].    Here are a list of additional numbers you can call if you are wanting to resume services through [facility]:  [facility] Assessment Intake: [phone]  [facility] Adult [facility] Intensive Outpatient  call: [phone]  [facility] Admissions [phone]    Recovery Clinic call: [phone]  [facility]: [phone]  Medical Records call: [phone]  Billing Department call: [phone]    Please remember to take all of your behavioral discharge planning and lab paperwork to any follow up appointments, it contains your lab results, diagnosis, medication list and discharge recommendations.      THANK YOU FOR CHOOSING [facility]

## 2025-03-31 NOTE — PLAN OF CARE
Group Attendance:  attended full group    Time session began: 1645  Time session ended: 1740  Patient's total time in group: 55    Total # Attendees   4   Group Type psychotherapeutic     Group Topic Covered emotional regulation, insight improvement, symptom management, healthy coping skills, Triggers and warning sighs, and goals and motivation     Group Session Detail Structured therapeutic process group with a focus on recovery; building self-esteem, insight, positive change, choices and problem solving via questions and verbal interactions.      Patient's response to the group topic/interactions:  positive affect, cooperative with task, organized, supportive of peers, and listened actively     Patient Details: Pt was engaged throughout session, reports this was her first group and she enjoyed it, would like to attend more. Reports looking forward to therapy post discharge to process grief.           15212 - Group psychotherapy - 1 Session  Patient Active Problem List   Diagnosis    Alcohol withdrawal (H)    Alcohol withdrawal syndrome without complication (H)    Ketosis (H)    Alcoholic intoxication without complication    Vomiting, unspecified vomiting type, unspecified whether nausea present    Alcohol withdrawal, uncomplicated (H)    Alcohol dependence with withdrawal with complication (H)

## 2025-04-01 VITALS
OXYGEN SATURATION: 99 % | DIASTOLIC BLOOD PRESSURE: 86 MMHG | BODY MASS INDEX: 22.01 KG/M2 | HEART RATE: 76 BPM | TEMPERATURE: 97.2 F | SYSTOLIC BLOOD PRESSURE: 133 MMHG | HEIGHT: 67 IN | WEIGHT: 140.21 LBS | RESPIRATION RATE: 16 BRPM

## 2025-04-01 PROCEDURE — 250N000009 HC RX 250: Performed by: STUDENT IN AN ORGANIZED HEALTH CARE EDUCATION/TRAINING PROGRAM

## 2025-04-01 PROCEDURE — 250N000013 HC RX MED GY IP 250 OP 250 PS 637: Performed by: PSYCHIATRY & NEUROLOGY

## 2025-04-01 PROCEDURE — 99239 HOSP IP/OBS DSCHRG MGMT >30: CPT | Performed by: PSYCHIATRY & NEUROLOGY

## 2025-04-01 RX ADMIN — THIAMINE HCL TAB 100 MG 100 MG: 100 TAB at 07:48

## 2025-04-01 RX ADMIN — ACETAMINOPHEN 650 MG: 325 TABLET, FILM COATED ORAL at 07:49

## 2025-04-01 RX ADMIN — OXYMETAZOLINE HYDROCHLORIDE 2 SPRAY: 0.05 SPRAY NASAL at 08:04

## 2025-04-01 RX ADMIN — SERTRALINE HYDROCHLORIDE 25 MG: 25 TABLET ORAL at 07:49

## 2025-04-01 RX ADMIN — FOLIC ACID 1 MG: 1 TABLET ORAL at 07:48

## 2025-04-01 RX ADMIN — Medication 1 TABLET: at 07:48

## 2025-04-01 ASSESSMENT — ACTIVITIES OF DAILY LIVING (ADL)
ADLS_ACUITY_SCORE: 30
HYGIENE/GROOMING: INDEPENDENT
ADLS_ACUITY_SCORE: 30
LAUNDRY: WITH SUPERVISION
ADLS_ACUITY_SCORE: 30
ORAL_HYGIENE: INDEPENDENT
ADLS_ACUITY_SCORE: 30
DRESS: STREET CLOTHES

## 2025-04-01 NOTE — PLAN OF CARE
Goal Outcome Evaluation:    Plan of Care Reviewed With: patient      Patient discharged to home; discharge instructions and medications explained to patient with no question asked.  Patient verbalized understanding of the discharge instruction  Medication and belonging sent with patient upon discharge.

## 2025-04-01 NOTE — PROGRESS NOTES
[address]     Daily Encounter: Met with team, discussed patient progress, discharge plan and any impediments to discharge.    3/31/25: Patient is a 34 year old female presenting to  for alcohol detox. Patient reports periods of binge drinking over the last couple years, drinking up to 8 drinks daily, mostly wine.Writer met with patient and completed initial interview, [facility] Safety Plan, and discussed any need for assessment for placement. Patient reports she is interested in detox only at this time, declined assessment, but would like some resources for grief counseling to look into on her own time. Writer provided resources for patient.    4/1/25 - Writer met with patient. Patient reports having an intake appointment with [facility] in [place] on 4/3/25 at 1 pm and reports being pleased at the outcome of coming to  detox and the plans to attend [facility].    Insurance: [employer] Open Access     Legal Status:  Voluntary   County: N/A  File Number: N/A  Start and expiration date of commitment: N/A    SUDs Assessment Status: Patient declined    ROIs on file: None at this time     Living Situation: Lives independently     Current Providers, Supports & Collateral: None at this time     Current Plan/Referral Status: Detox only / grief counseling resources provided / patient has intake scheduled with [facility]     Safety Plan Status:  [facility] Safety Plan completed      STAN Bergman  [address] - Adult Inpatient Addiction Psychiatry Unit

## 2025-04-01 NOTE — PLAN OF CARE
Problem: Sleep Disturbance  Goal: Adequate Sleep/Rest  Outcome: Progressing   Goal Outcome Evaluation:         Patient continue to be out of detox. Slept for 6.5 hrs. No safety or behavior issue noted or reported.    Staff to continue monitor.

## 2025-04-01 NOTE — DISCHARGE SUMMARY
Psychiatric Discharge Summary    Jacquie Henderson MRN# [id number]   Age: 34 year old YOB: 1990     Date of Admission:  3/30/2025  Date of Discharge:  2025 12:49 PM  Admitting Physician:  Vanna Sandoval DO  Discharge Physician:  Vanna Sandoval DO         Event Leading to Hospitalization:   Jacquie is a 34 year old female with history of substance use and mood disorder who presented to ED seeking detox from alcohol.     Chart review completed including ED notes from this encounter, medical admission for alcohol withdrawal 25 at [facility] also treated for starvation ketosis and hypomagnesemia and hypocalcemia and anemia, recommended pt follow up with cardiology in 2-3 weeks for recurrent syncope was started on sertraline for mood and IOP CD tx recommended; no previous admissions to 3A and no psychiatric admissions noted.      Per ED physician note: Jacquie Henderson is a 34 year old female with a past medical history of alcohol use disorder who presents to the emergency department for alcohol detox.  She reports periods of binge drinking over the last couple years, drinking up to 8 drinks daily; she states she drinks mostly wine.  She states that she has gone through withdrawal.  Denies history of withdrawal seizures or DTs.  Notes using THC a couple times a year but denies other substance use.  Denies acute mental health concerns.  Currently patient states that she feels nauseous, shaky, and not like herself.  She also notes some chest tightness which she states that she has had before when she is gone through withdrawal.  No vomiting.  No recent illness, fever.      Upon interview: Patient confirms information above, reports that she first her drinking alcohol when she was in high school and drink socially while in college.  She reports that her use escalated around the age of 27 due to some trauma and increased stressors including finding her mother  with her death being  "complicated from her alcohol use disorder.  She has had periods of sobriety on and off, tends to binge drink, reports in the last 2 years her binge drinking has definitely escalated.  She reports that she will get a lot of anxiety and does not feel like she is completely processed the trauma and grief she has experienced and will utilize alcohol as a coping mechanism.  She states that she will start drinking a little bit when she is feeling anxious and then she \"just does not stop\".  She will drink large quantities of wine and vodka.  She has tolerance to alcohol, drinks more intended, difficulty cutting down when she starts drinking and withdrawal symptoms when she stops including having some recent weakness, tiredness, increased heart rate, sweating tremor and headache.  Denies any history of seizures or DTs.  She also reports that she uses THC gummies 3 times a year and takes CBD and melatonin combination for sleep every night.  Denies any history of psychosis or current hallucinations.  Denies having any SI or HI.  Denies any history of suicide attempts or psychiatric admissions.  She states her mood has been \"fine\" and that is been relatively stable, she feels that she just, going through the motions.  She has taken sertraline in the past from her mood and wanted to be really helpful and wanting to resume this.  She also has utilized Antabuse to help support her sobriety and wanting to resume this on discharge as well.  She is not currently interested in going to CD treatment given the hours of her job and her greatly enjoying her work, she is open to engaging with individualized support through therapy to help her work through her trauma and grief and take Antabuse as previously stated.  She will discuss other options with case management staff as her stabilization progresses.          See Admission note by Vanna Sandoval DO on 3/31/25 for additional details.          Diagnoses:     Alcohol use disorder, " severe, dependence with withdrawal with complication   Cannabis use, unspecified   Unspecified anxiety  Ongoing bereavement vs Adjustment disorder   R/O PTSD  High anion gap metabolic acidosis  Hypochloremia  Hyperglycemia  Tachycardia   Clinically Significant Risk Factors          # Hypochloremia: Lowest Cl = 97 mmol/L in last 2 days, will monitor as appropriate                                       Labs:     Recent Results (from the past week)   Alcohol breath test POCT    Collection Time: 03/30/25  1:26 PM   Result Value Ref Range    Alcohol Breath Test 0.160 (A) 0.00 - 0.01   Extra Blue Top Tube    Collection Time: 03/30/25  1:57 PM   Result Value Ref Range    Hold Specimen JIC    Extra Red Top Tube    Collection Time: 03/30/25  1:57 PM   Result Value Ref Range    Hold Specimen JIC    Extra Green Top (Lithium Heparin) Tube    Collection Time: 03/30/25  1:57 PM   Result Value Ref Range    Hold Specimen JIC    Extra Purple Top Tube    Collection Time: 03/30/25  1:57 PM   Result Value Ref Range    Hold Specimen JIC    Comprehensive metabolic panel    Collection Time: 03/30/25  1:57 PM   Result Value Ref Range    Sodium 135 135 - 145 mmol/L    Potassium 4.0 3.4 - 5.3 mmol/L    Carbon Dioxide (CO2) 21 (L) 22 - 29 mmol/L    Anion Gap 21 (H) 7 - 15 mmol/L    Urea Nitrogen 10.7 6.0 - 20.0 mg/dL    Creatinine 0.65 0.51 - 0.95 mg/dL    GFR Estimate >90 >60 mL/min/1.73m2    Calcium 9.5 8.8 - 10.4 mg/dL    Chloride 93 (L) 98 - 107 mmol/L    Glucose 188 (H) 70 - 99 mg/dL    Alkaline Phosphatase 67 40 - 150 U/L    AST 31 0 - 45 U/L    ALT 17 0 - 50 U/L    Protein Total 8.9 (H) 6.4 - 8.3 g/dL    Albumin 5.1 3.5 - 5.2 g/dL    Bilirubin Total 0.5 <=1.2 mg/dL   Magnesium    Collection Time: 03/30/25  1:57 PM   Result Value Ref Range    Magnesium 1.8 1.7 - 2.3 mg/dL   Troponin T, High Sensitivity    Collection Time: 03/30/25  1:57 PM   Result Value Ref Range    Troponin T, High Sensitivity <6 <=14 ng/L   CBC with platelets and  differential    Collection Time: 03/30/25  1:57 PM   Result Value Ref Range    WBC Count 6.4 4.0 - 11.0 10e3/uL    RBC Count 4.69 3.80 - 5.20 10e6/uL    Hemoglobin 14.7 11.7 - 15.7 g/dL    Hematocrit 41.7 35.0 - 47.0 %    MCV 89 78 - 100 fL    MCH 31.3 26.5 - 33.0 pg    MCHC 35.3 31.5 - 36.5 g/dL    RDW 11.9 10.0 - 15.0 %    Platelet Count 289 150 - 450 10e3/uL    % Neutrophils 72 %    % Lymphocytes 23 %    % Monocytes 4 %    % Eosinophils 0 %    % Basophils 1 %    % Immature Granulocytes 0 %    NRBCs per 100 WBC 0 <1 /100    Absolute Neutrophils 4.6 1.6 - 8.3 10e3/uL    Absolute Lymphocytes 1.5 0.8 - 5.3 10e3/uL    Absolute Monocytes 0.3 0.0 - 1.3 10e3/uL    Absolute Eosinophils 0.0 0.0 - 0.7 10e3/uL    Absolute Basophils 0.1 0.0 - 0.2 10e3/uL    Absolute Immature Granulocytes 0.0 <=0.4 10e3/uL    Absolute NRBCs 0.0 10e3/uL   GGT    Collection Time: 03/30/25  1:57 PM   Result Value Ref Range    GGT 23 5 - 36 U/L   Hemoglobin A1c    Collection Time: 03/30/25  1:57 PM   Result Value Ref Range    Estimated Average Glucose 82 <117 mg/dL    Hemoglobin A1C 4.5 <5.7 %   TSH with free T4 reflex    Collection Time: 03/30/25  1:57 PM   Result Value Ref Range    TSH 0.78 0.30 - 4.20 uIU/mL   EKG 12 lead    Collection Time: 03/30/25  2:01 PM   Result Value Ref Range    Systolic Blood Pressure  mmHg    Diastolic Blood Pressure  mmHg    Ventricular Rate 112 BPM    Atrial Rate 112 BPM    VA Interval 124 ms    QRS Duration 92 ms     ms    QTc 464 ms    P Axis 66 degrees    R AXIS 67 degrees    T Axis 35 degrees    Interpretation ECG       Sinus tachycardia  Possible Left atrial enlargement  Incomplete right bundle branch block  Borderline ECG  Unconfirmed report - interpretation of this ECG is computer generated - see medical record for final interpretation  Confirmed by - EMERGENCY ROOM, PHYSICIAN ([id number]),  LINDA WYATT ([id number]) on 3/31/2025 7:05:36 AM     HCG qualitative urine    Collection Time: 03/30/25  2:15 PM    Result Value Ref Range    hCG Urine Qualitative Negative Negative   Urine Drug Screen Panel    Collection Time: 03/30/25  2:15 PM   Result Value Ref Range    Amphetamines Urine Screen Negative Screen Negative    Barbituates Urine Screen Negative Screen Negative    Benzodiazepine Urine Screen Negative Screen Negative    Cannabinoids Urine Screen Negative Screen Negative    Cocaine Urine Screen Negative Screen Negative    Fentanyl Qual Urine Screen Negative Screen Negative    Opiates Urine Screen Negative Screen Negative    PCP Urine Screen Negative Screen Negative   Basic metabolic panel    Collection Time: 03/31/25  8:45 AM   Result Value Ref Range    Sodium 138 135 - 145 mmol/L    Potassium 4.3 3.4 - 5.3 mmol/L    Chloride 97 (L) 98 - 107 mmol/L    Carbon Dioxide (CO2) 30 (H) 22 - 29 mmol/L    Anion Gap 11 7 - 15 mmol/L    Urea Nitrogen 11.4 6.0 - 20.0 mg/dL    Creatinine 0.79 0.51 - 0.95 mg/dL    GFR Estimate >90 >60 mL/min/1.73m2    Calcium 9.8 8.8 - 10.4 mg/dL    Glucose 120 (H) 70 - 99 mg/dL   Extra Purple Top Tube    Collection Time: 03/31/25  8:45 AM   Result Value Ref Range    Hold Specimen JIC             Consults:   IM consult placed for management of other medical concerns, per consult note on 3/31/25:   Jacquie Henderson is a 34 year old female with PMHx significant for anxiety and alcohol use disorder.  Patient was admitted to station 3A for detox. Internal medicine consulted for medical co-management.     # Alcohol withdrawal, hx of alcohol use disorder   # Metabolic acidosis secondary alcohol use  MSSA 6-9 this shift.  Denies hx of withdrawal seizures. Pt reports binge drinking behavior for the past couple of years, up to 8 drinks daily.  She drinks mostly wine.  Blood EtOH on arrival was 0.16.  Anion gap elevated to 21.  Liver function tests are normal.  She is already on thiamine, folate, multivitamin.  - Continue MSSA   - Folvite, multi-vites, thiamine supplementation   - Further management per  Psychiatry   - Home med list reviewed.     # Anxiety  PTA med list includes sertraline  - Management per psychiatry           Currently, medically stable and internal medicine will sign off. Please contact if future questions or concerns arise. Thank you for the opportunity to be a part of this patient's care.         Elmer Travis PA-C  Internal Medicine [facility] Hospitalist            Hospital Course:   Jacquie Henderson is a 34 year old female with history of substance use and mood disorder who presented to ED seeking detox from alcohol. Medically cleared in ED, admitted to  as voluntary patient. Drinking wine and vodka daily, EtOH breath test 0.160(H), UDS negative. MSSA with diazepam started for alcohol withdrawal. Withdrawal precautions in place, denies history of seizures. Admission labs ordered and reviewed those resulted. IM consult placed. Denying safety concerns including SI and HI. PTA medications continued as appropriate, pt wanting to resume sertraline for mood, had not been taking recently and plans to resume antabuse on discharge to support sobriety. Pt reports goals for hospitalization are to complete detox, get referral for therapy to help process long standing trauma/grief and resume antabuse.     Jacquie Henderson did participate in groups and was visible in the milieu. The patient's symptoms of alcohol withdrawal improved. She was out of detox on 4/1 and requested to discharge home with antabuse and follow up with IOP and AA.     Today Jacquie Henderson reports having no thoughts of harming self or others. In addition, she has notable risk factors for self-harm, including single status, anxiety, and substance abuse. However, risk is mitigated by commitment to family, absence of past attempts, ability to volunteer a safety plan, history of seeking help when needed, and pt is future oriented and denying SI. Therefore, based on all available evidence including the factors cited above, she does not  appear to be at imminent risk for self-harm, does not meet criteria for a 72-hr hold, and therefore remains appropriate for ongoing outpatient level of care.     Jacquie Henderson was  discharged  to home. At the time of discharge Jacquie Henderson was determined to not be a danger to herself or others.          Discharge Medications:     Discharge Medication List as of 4/1/2025 11:26 AM        START taking these medications    Details   disulfiram (ANTABUSE) 250 MG tablet Take 1 tablet (250 mg) by mouth daily., Disp-30 tablet, R-2, E-Prescribe      multivitamin w/minerals (THERA-VIT-M) tablet Take 1 tablet by mouth daily., Disp-30 tablet, R-0, E-Prescribe           CONTINUE these medications which have CHANGED    Details   folic acid (FOLVITE) 1 MG tablet Take 1 tablet (1 mg) by mouth daily., Disp-30 tablet, R-0, E-Prescribe      sertraline (ZOLOFT) 50 MG tablet Take 0.5tabs or 25mg daily for 2 weeks, if tolerating can increase to 50mg daily or 1 tablet daily, Disp-30 tablet, R-2, E-Prescribe      thiamine (B-1) 100 MG tablet Take 1 tablet (100 mg) by mouth daily., Disp-30 tablet, R-0, E-Prescribe           CONTINUE these medications which have NOT CHANGED    Details   melatonin 5 MG tablet Take 5 mg by mouth nightly as needed for sleep, Historical      oxymetazoline (AFRIN) 0.05 % nasal spray Spray 0.2 mLs (2 sprays) into both nostrils 2 times daily as needed for congestion or other (nosebleed)., Disp-6 mL, R-0, E-Prescribe           STOP taking these medications       multivitamin (ONE-DAILY) tablet Comments:   Reason for Stopping:                    Psychiatric Examination:   Appearance:  awake, alert and adequately groomed  Attitude:  cooperative  Eye Contact:  good  Mood:   improved, appears euthymic  Affect:  appropriate and in normal range and mood congruent  Speech:  clear, coherent and normal prosody  Psychomotor Behavior:  no evidence of tardive dyskinesia, dystonia, or tics  Thought Process:  logical,  linear, and goal oriented  Associations:  no loose associations  Thought Content:  no evidence of suicidal ideation or homicidal ideation and no evidence of psychotic thought  Insight:  good  Judgment:  intact  Oriented to:  time, person, and place  Attention Span and Concentration:  intact  Recent and Remote Memory:  intact  Language: English, fluent  Fund of Knowledge: appropriate  Muscle Strength and Tone: normal  Gait and Station: Normal         Discharge Plan:   Recommendation: It is recommended that you abstain from alcohol and other mood-altering chemicals. You have reported scheduling substance use treatment services independently at this time.     Disposition: Released to home     Follow-up Appointment:   Appointment: [facility]:  Provider Dr.Jen hemant Ramos     You are aware you should make a follow up appointment with your primary care doctor for medical and medication management     Pt reports appointment for IOP:   Appointment Date/Time: Thursday 4/3/25 at 1 pm      Provider: [facility]      Address: [address] [place] [address]      Phone Number: [phone]        Attestation:  Patient has been seen and evaluated by me, Vanna Sandoval DO on day of discharge. 36 minutes were spent in coordination of discharge planning.

## 2025-04-03 ENCOUNTER — PATIENT OUTREACH (OUTPATIENT)
Dept: CARE COORDINATION | Facility: CLINIC | Age: 35
End: 2025-04-03
Payer: COMMERCIAL

## 2025-04-03 NOTE — PROGRESS NOTES
Connected Care Resource Center Contact  [facility]/Voicemail     Clinical Data: Post-Discharge Outreach     Outreach attempted x 2.  Left message on patient's voicemail, providing [facility]'s central phone number of [phone] ([phone]) for questions/concerns and/or to schedule an appt with an [facility] provider, if they do not have a PCP.      Plan:  [facility] will do no further outreaches at this time.       HANNAH Loredo  [facility], [facility]    *Connected Care Resource Team does NOT follow patient ongoing. Referrals are identified based on internal discharge reports and the outreach is to ensure patient has an understanding of their discharge instructions.

## 2025-05-16 ENCOUNTER — APPOINTMENT (OUTPATIENT)
Dept: GENERAL RADIOLOGY | Facility: CLINIC | Age: 35
End: 2025-05-16
Attending: STUDENT IN AN ORGANIZED HEALTH CARE EDUCATION/TRAINING PROGRAM
Payer: COMMERCIAL

## 2025-05-16 ENCOUNTER — HOSPITAL ENCOUNTER (EMERGENCY)
Facility: CLINIC | Age: 35
Discharge: HOME | End: 2025-05-16
Attending: STUDENT IN AN ORGANIZED HEALTH CARE EDUCATION/TRAINING PROGRAM | Admitting: STUDENT IN AN ORGANIZED HEALTH CARE EDUCATION/TRAINING PROGRAM
Payer: COMMERCIAL

## 2025-05-16 VITALS
SYSTOLIC BLOOD PRESSURE: 136 MMHG | RESPIRATION RATE: 20 BRPM | HEIGHT: 67 IN | WEIGHT: 132.28 LBS | DIASTOLIC BLOOD PRESSURE: 87 MMHG | TEMPERATURE: 99.2 F | HEART RATE: 135 BPM | OXYGEN SATURATION: 98 % | BODY MASS INDEX: 20.76 KG/M2

## 2025-05-16 DIAGNOSIS — F10.930 ALCOHOL WITHDRAWAL, UNCOMPLICATED (H): ICD-10-CM

## 2025-05-16 DIAGNOSIS — J02.9 SORE THROAT: ICD-10-CM

## 2025-05-16 LAB
ALBUMIN SERPL BCG-MCNC: 4.9 G/DL (ref 3.5–5.2)
ALP SERPL-CCNC: 71 U/L (ref 40–150)
ALT SERPL W P-5'-P-CCNC: 25 U/L (ref 0–50)
ANION GAP SERPL CALCULATED.3IONS-SCNC: 19 MMOL/L (ref 7–15)
AST SERPL W P-5'-P-CCNC: 38 U/L (ref 0–45)
BASOPHILS # BLD AUTO: 0 10E3/UL (ref 0–0.2)
BASOPHILS NFR BLD AUTO: 1 %
BILIRUB SERPL-MCNC: 0.6 MG/DL
BUN SERPL-MCNC: 6.9 MG/DL (ref 6–20)
CALCIUM SERPL-MCNC: 9.4 MG/DL (ref 8.8–10.4)
CHLORIDE SERPL-SCNC: 96 MMOL/L (ref 98–107)
CREAT SERPL-MCNC: 0.72 MG/DL (ref 0.51–0.95)
EGFRCR SERPLBLD CKD-EPI 2021: >90 ML/MIN/1.73M2
EOSINOPHIL # BLD AUTO: 0 10E3/UL (ref 0–0.7)
EOSINOPHIL NFR BLD AUTO: 0 %
ERYTHROCYTE [DISTWIDTH] IN BLOOD BY AUTOMATED COUNT: 12 % (ref 10–15)
ETHANOL SERPL-MCNC: 0.3 G/DL
FLUAV RNA SPEC QL NAA+PROBE: NEGATIVE
FLUBV RNA RESP QL NAA+PROBE: NEGATIVE
GLUCOSE SERPL-MCNC: 93 MG/DL (ref 70–99)
HCG SERPL QL: NEGATIVE
HCO3 SERPL-SCNC: 26 MMOL/L (ref 22–29)
HCT VFR BLD AUTO: 38.3 % (ref 35–47)
HGB BLD-MCNC: 13.4 G/DL (ref 11.7–15.7)
HOLD SPECIMEN: NORMAL
IMM GRANULOCYTES # BLD: 0 10E3/UL
IMM GRANULOCYTES NFR BLD: 0 %
LIPASE SERPL-CCNC: 23 U/L (ref 13–60)
LYMPHOCYTES # BLD AUTO: 1.2 10E3/UL (ref 0.8–5.3)
LYMPHOCYTES NFR BLD AUTO: 33 %
MAGNESIUM SERPL-MCNC: 1.7 MG/DL (ref 1.7–2.3)
MCH RBC QN AUTO: 29.7 PG (ref 26.5–33)
MCHC RBC AUTO-ENTMCNC: 35 G/DL (ref 31.5–36.5)
MCV RBC AUTO: 85 FL (ref 78–100)
MONOCYTES # BLD AUTO: 0.3 10E3/UL (ref 0–1.3)
MONOCYTES NFR BLD AUTO: 8 %
NEUTROPHILS # BLD AUTO: 2.1 10E3/UL (ref 1.6–8.3)
NEUTROPHILS NFR BLD AUTO: 58 %
NRBC # BLD AUTO: 0 10E3/UL
NRBC BLD AUTO-RTO: 0 /100
PHOSPHATE SERPL-MCNC: 3.6 MG/DL (ref 2.5–4.5)
PLATELET # BLD AUTO: 309 10E3/UL (ref 150–450)
POTASSIUM SERPL-SCNC: 3.9 MMOL/L (ref 3.4–5.3)
PROT SERPL-MCNC: 8.1 G/DL (ref 6.4–8.3)
RBC # BLD AUTO: 4.51 10E6/UL (ref 3.8–5.2)
RSV RNA SPEC NAA+PROBE: NEGATIVE
S PYO DNA THROAT QL NAA+PROBE: NOT DETECTED
SARS-COV-2 RNA RESP QL NAA+PROBE: NEGATIVE
SODIUM SERPL-SCNC: 141 MMOL/L (ref 135–145)
TSH SERPL DL<=0.005 MIU/L-ACNC: 1.43 UIU/ML (ref 0.3–4.2)
WBC # BLD AUTO: 3.6 10E3/UL (ref 4–11)

## 2025-05-16 PROCEDURE — 250N000013 HC RX MED GY IP 250 OP 250 PS 637: Performed by: STUDENT IN AN ORGANIZED HEALTH CARE EDUCATION/TRAINING PROGRAM

## 2025-05-16 PROCEDURE — 36415 COLL VENOUS BLD VENIPUNCTURE: CPT | Performed by: STUDENT IN AN ORGANIZED HEALTH CARE EDUCATION/TRAINING PROGRAM

## 2025-05-16 PROCEDURE — 87637 SARSCOV2&INF A&B&RSV AMP PRB: CPT | Performed by: STUDENT IN AN ORGANIZED HEALTH CARE EDUCATION/TRAINING PROGRAM

## 2025-05-16 PROCEDURE — 83735 ASSAY OF MAGNESIUM: CPT | Performed by: STUDENT IN AN ORGANIZED HEALTH CARE EDUCATION/TRAINING PROGRAM

## 2025-05-16 PROCEDURE — 99285 EMERGENCY DEPT VISIT HI MDM: CPT | Mod: 25

## 2025-05-16 PROCEDURE — 93005 ELECTROCARDIOGRAM TRACING: CPT

## 2025-05-16 PROCEDURE — 84703 CHORIONIC GONADOTROPIN ASSAY: CPT | Performed by: STUDENT IN AN ORGANIZED HEALTH CARE EDUCATION/TRAINING PROGRAM

## 2025-05-16 PROCEDURE — 82310 ASSAY OF CALCIUM: CPT | Performed by: STUDENT IN AN ORGANIZED HEALTH CARE EDUCATION/TRAINING PROGRAM

## 2025-05-16 PROCEDURE — 96374 THER/PROPH/DIAG INJ IV PUSH: CPT

## 2025-05-16 PROCEDURE — 84100 ASSAY OF PHOSPHORUS: CPT | Performed by: STUDENT IN AN ORGANIZED HEALTH CARE EDUCATION/TRAINING PROGRAM

## 2025-05-16 PROCEDURE — 87651 STREP A DNA AMP PROBE: CPT | Performed by: STUDENT IN AN ORGANIZED HEALTH CARE EDUCATION/TRAINING PROGRAM

## 2025-05-16 PROCEDURE — 250N000011 HC RX IP 250 OP 636: Performed by: STUDENT IN AN ORGANIZED HEALTH CARE EDUCATION/TRAINING PROGRAM

## 2025-05-16 PROCEDURE — 82077 ASSAY SPEC XCP UR&BREATH IA: CPT | Performed by: STUDENT IN AN ORGANIZED HEALTH CARE EDUCATION/TRAINING PROGRAM

## 2025-05-16 PROCEDURE — 96376 TX/PRO/DX INJ SAME DRUG ADON: CPT

## 2025-05-16 PROCEDURE — 96375 TX/PRO/DX INJ NEW DRUG ADDON: CPT

## 2025-05-16 PROCEDURE — 84443 ASSAY THYROID STIM HORMONE: CPT | Performed by: STUDENT IN AN ORGANIZED HEALTH CARE EDUCATION/TRAINING PROGRAM

## 2025-05-16 PROCEDURE — 85004 AUTOMATED DIFF WBC COUNT: CPT | Performed by: STUDENT IN AN ORGANIZED HEALTH CARE EDUCATION/TRAINING PROGRAM

## 2025-05-16 PROCEDURE — 96361 HYDRATE IV INFUSION ADD-ON: CPT

## 2025-05-16 PROCEDURE — 71046 X-RAY EXAM CHEST 2 VIEWS: CPT

## 2025-05-16 PROCEDURE — 258N000003 HC RX IP 258 OP 636: Performed by: STUDENT IN AN ORGANIZED HEALTH CARE EDUCATION/TRAINING PROGRAM

## 2025-05-16 PROCEDURE — 83690 ASSAY OF LIPASE: CPT | Performed by: STUDENT IN AN ORGANIZED HEALTH CARE EDUCATION/TRAINING PROGRAM

## 2025-05-16 RX ORDER — MULTIPLE VITAMINS W/ MINERALS TAB 9MG-400MCG
1 TAB ORAL DAILY
Status: DISCONTINUED | OUTPATIENT
Start: 2025-05-16 | End: 2025-05-17 | Stop reason: HOSPADM

## 2025-05-16 RX ORDER — KETOROLAC TROMETHAMINE 15 MG/ML
15 INJECTION, SOLUTION INTRAMUSCULAR; INTRAVENOUS ONCE
Status: COMPLETED | OUTPATIENT
Start: 2025-05-16 | End: 2025-05-16

## 2025-05-16 RX ORDER — FOLIC ACID 1 MG/1
1 TABLET ORAL DAILY
Status: DISCONTINUED | OUTPATIENT
Start: 2025-05-16 | End: 2025-05-17 | Stop reason: HOSPADM

## 2025-05-16 RX ORDER — OLANZAPINE 5 MG/1
5-10 TABLET, ORALLY DISINTEGRATING ORAL EVERY 6 HOURS PRN
Status: DISCONTINUED | OUTPATIENT
Start: 2025-05-16 | End: 2025-05-17 | Stop reason: HOSPADM

## 2025-05-16 RX ORDER — CLONIDINE HYDROCHLORIDE 0.1 MG/1
0.1 TABLET ORAL EVERY 8 HOURS
Status: DISCONTINUED | OUTPATIENT
Start: 2025-05-16 | End: 2025-05-17 | Stop reason: HOSPADM

## 2025-05-16 RX ORDER — DIAZEPAM 5 MG/1
10 TABLET ORAL EVERY 30 MIN PRN
Status: DISCONTINUED | OUTPATIENT
Start: 2025-05-16 | End: 2025-05-17 | Stop reason: HOSPADM

## 2025-05-16 RX ORDER — ONDANSETRON 2 MG/ML
4 INJECTION INTRAMUSCULAR; INTRAVENOUS ONCE
Status: COMPLETED | OUTPATIENT
Start: 2025-05-16 | End: 2025-05-16

## 2025-05-16 RX ORDER — HALOPERIDOL 5 MG/ML
2.5-5 INJECTION INTRAMUSCULAR EVERY 6 HOURS PRN
Status: DISCONTINUED | OUTPATIENT
Start: 2025-05-16 | End: 2025-05-17 | Stop reason: HOSPADM

## 2025-05-16 RX ORDER — DIAZEPAM 10 MG/2ML
5-10 INJECTION, SOLUTION INTRAMUSCULAR; INTRAVENOUS EVERY 30 MIN PRN
Status: DISCONTINUED | OUTPATIENT
Start: 2025-05-16 | End: 2025-05-17 | Stop reason: HOSPADM

## 2025-05-16 RX ADMIN — SODIUM CHLORIDE 1000 ML: 0.9 INJECTION, SOLUTION INTRAVENOUS at 19:35

## 2025-05-16 RX ADMIN — Medication 1 TABLET: at 18:07

## 2025-05-16 RX ADMIN — DIAZEPAM 10 MG: 5 INJECTION INTRAMUSCULAR; INTRAVENOUS at 19:33

## 2025-05-16 RX ADMIN — ONDANSETRON 4 MG: 2 INJECTION, SOLUTION INTRAMUSCULAR; INTRAVENOUS at 18:06

## 2025-05-16 RX ADMIN — KETOROLAC TROMETHAMINE 15 MG: 15 INJECTION, SOLUTION INTRAMUSCULAR; INTRAVENOUS at 19:32

## 2025-05-16 RX ADMIN — DIAZEPAM 10 MG: 5 INJECTION INTRAMUSCULAR; INTRAVENOUS at 18:07

## 2025-05-16 RX ADMIN — CLONIDINE HYDROCHLORIDE 0.1 MG: 0.1 TABLET ORAL at 18:06

## 2025-05-16 RX ADMIN — SODIUM CHLORIDE 1000 ML: 9 INJECTION, SOLUTION INTRAVENOUS at 18:07

## 2025-05-16 RX ADMIN — FOLIC ACID 1 MG: 1 TABLET ORAL at 18:06

## 2025-05-16 RX ADMIN — DIAZEPAM 5 MG: 5 INJECTION INTRAMUSCULAR; INTRAVENOUS at 20:39

## 2025-05-16 RX ADMIN — THIAMINE HCL TAB 100 MG 100 MG: 100 TAB at 18:07

## 2025-05-16 ASSESSMENT — LIFESTYLE VARIABLES
ANXIETY: 2
ORIENTATION AND CLOUDING OF SENSORIUM: ORIENTED AND CAN DO SERIAL ADDITIONS
ORIENTATION AND CLOUDING OF SENSORIUM: ORIENTED AND CAN DO SERIAL ADDITIONS
VISUAL DISTURBANCES: VERY MILD SENSITIVITY
PAROXYSMAL SWEATS: BARELY PERCEPTIBLE SWEATING, PALMS MOIST
AUDITORY DISTURBANCES: NOT PRESENT
ORIENTATION AND CLOUDING OF SENSORIUM: ORIENTED AND CAN DO SERIAL ADDITIONS
TOTAL SCORE: 17
AGITATION: NORMAL ACTIVITY
HEADACHE, FULLNESS IN HEAD: MILD
TREMOR: 5
VISUAL DISTURBANCES: NOT PRESENT
NAUSEA AND VOMITING: 3
AGITATION: NORMAL ACTIVITY
PAROXYSMAL SWEATS: BARELY PERCEPTIBLE SWEATING, PALMS MOIST
VISUAL DISTURBANCES: MILD SENSITIVITY
NAUSEA AND VOMITING: 2
ANXIETY: 2
HEADACHE, FULLNESS IN HEAD: MODERATELY SEVERE
TOTAL SCORE: 17
AUDITORY DISTURBANCES: NOT PRESENT
HEADACHE, FULLNESS IN HEAD: MODERATELY SEVERE
TOTAL SCORE: 11
NAUSEA AND VOMITING: 3
TREMOR: 5
AUDITORY DISTURBANCES: NOT PRESENT
TREMOR: 5
AGITATION: NORMAL ACTIVITY
PAROXYSMAL SWEATS: 2
ANXIETY: MILDLY ANXIOUS

## 2025-05-16 ASSESSMENT — ACTIVITIES OF DAILY LIVING (ADL)
ADLS_ACUITY_SCORE: 56

## 2025-05-16 NOTE — ED TRIAGE NOTES
Pt arrives from home states she will be going through withdrawal.  Drove here had multiple drinks PTA.  Pt has chest pain and abdominal pain nausea vomiting and denies seizures with withdrawal.

## 2025-05-16 NOTE — ED PROVIDER NOTES
"  Emergency Department Note      History of Present Illness     Chief Complaint   Alcohol Problem      HPI   Jacquie Henderson is a 35 year old female who presents with palpitations, chest pain, abdominal pain, nausea, vomiting, decreased p.o.  Denies hematemesis or melena.  Has some urinary frequency and was recently treated for UTI but still some urinary frequency.  Denies flank pain.  Has no blood in her urine.  Also reports a significant sore throat and low-grade fevers with cough.  Last drink was today, has been drinking significant amounts daily, requesting detox.  Denies chance of pregnancy.    Independent Historian   None    Review of External Notes   None    Past Medical History     Medical History and Problem List   Past Medical History:   Diagnosis Date    Anxiety     Substance abuse (H)        Medications   disulfiram (ANTABUSE) 250 MG tablet  folic acid (FOLVITE) 1 MG tablet  melatonin 5 MG tablet  multivitamin w/minerals (THERA-VIT-M) tablet  oxymetazoline (AFRIN) 0.05 % nasal spray  sertraline (ZOLOFT) 50 MG tablet  thiamine (B-1) 100 MG tablet        Surgical History   No past surgical history on file.    Physical Exam     Patient Vitals for the past 24 hrs:   BP Temp Temp src Pulse Resp SpO2 Height Weight   05/16/25 1800 -- -- -- 104 17 -- -- --   05/16/25 1715 (!) 160/135 99.2  F (37.3  C) Temporal (!) 140 20 98 % 1.702 m (5' 7\") 60 kg (132 lb 4.4 oz)     Physical Exam  General: Awake, alert, in no acute distress   HEENT: Atraumatic   EOM normal   External ears normal   Trachea midline  Dry mucous membranes  Posterior oropharynx mild erythema  Neck: Supple, normal ROM  CV: Regular rate, regular rhythm   No lower extremity edema  2+ radial and DP pulses  PULM: Breath sounds normal bilaterally  No wheezes or rales  ABD: Soft, non-tender, non-distended  Normal bowel sounds   No rebound or guarding   No CVA tenderness  MSK: No gross deformities  NEURO: Alert, no focal deficits.  Bilateral hand " tremors.  Skin: Warm, dry and intact      Diagnostics     Lab Results   Labs Ordered and Resulted from Time of ED Arrival to Time of ED Departure   COMPREHENSIVE METABOLIC PANEL - Abnormal       Result Value    Sodium 141      Potassium 3.9      Carbon Dioxide (CO2) 26      Anion Gap 19 (*)     Urea Nitrogen 6.9      Creatinine 0.72      GFR Estimate >90      Calcium 9.4      Chloride 96 (*)     Glucose 93      Alkaline Phosphatase 71      AST 38      ALT 25      Protein Total 8.1      Albumin 4.9      Bilirubin Total 0.6     ETHANOL LEVEL BLOOD - Abnormal    Ethanol Level Blood 0.30 (*)    CBC WITH PLATELETS AND DIFFERENTIAL - Abnormal    WBC Count 3.6 (*)     RBC Count 4.51      Hemoglobin 13.4      Hematocrit 38.3      MCV 85      MCH 29.7      MCHC 35.0      RDW 12.0      Platelet Count 309      % Neutrophils 58      % Lymphocytes 33      % Monocytes 8      % Eosinophils 0      % Basophils 1      % Immature Granulocytes 0      NRBCs per 100 WBC 0      Absolute Neutrophils 2.1      Absolute Lymphocytes 1.2      Absolute Monocytes 0.3      Absolute Eosinophils 0.0      Absolute Basophils 0.0      Absolute Immature Granulocytes 0.0      Absolute NRBCs 0.0     LIPASE - Normal    Lipase 23     TSH WITH FREE T4 REFLEX - Normal    TSH 1.43     MAGNESIUM - Normal    Magnesium 1.7     PHOSPHORUS - Normal    Phosphorus 3.6     ROUTINE UA WITH MICROSCOPIC REFLEX TO CULTURE   INFLUENZA A/B, RSV AND SARS-COV2 PCR   HCG QUALITATIVE PREGNANCY   GROUP A STREPTOCOCCUS PCR THROAT SWAB       Imaging   Chest XR,  PA & LAT   Final Result   IMPRESSION:       Lungs are clear. No evidence of pneumonia. No pleural effusions or pneumothorax. Normal pulmonary vascularity. Nonenlarged cardiac silhouette.          EKG   ECG results from 05/16/25   EKG 12-lead, tracing only     Value    Systolic Blood Pressure     Diastolic Blood Pressure     Ventricular Rate 96    Atrial Rate 96    FL Interval 128    QRS Duration 92        QTc 449     P Axis -7    R AXIS 38    T Axis 14    Interpretation ECG      Sinus rhythm  RSR' or QR pattern in V1 suggests right ventricular conduction delay  Borderline ECG  When compared with ECG of 30-Mar-2025 14:01,  Nonspecific T wave abnormality no longer evident in Anterior leads           Independent Interpretation   None    ED Course      Medications Administered   Medications   OLANZapine zydis (zyPREXA) ODT tab 5-10 mg (has no administration in time range)     Or   haloperidol lactate (HALDOL) injection 2.5-5 mg (has no administration in time range)   cloNIDine (CATAPRES) tablet 0.1 mg (0.1 mg Oral $Given 5/16/25 1806)   diazepam (VALIUM) tablet 10 mg ( Oral See Alternative 5/16/25 1933)     Or   diazepam (VALIUM) injection 5-10 mg (10 mg Intravenous $Given 5/16/25 1933)   multivitamin w/minerals (THERA-VIT-M) tablet 1 tablet (1 tablet Oral $Given 5/16/25 1807)   folic acid (FOLVITE) tablet 1 mg (1 mg Oral $Given 5/16/25 1806)   thiamine (B-1) tablet 100 mg (100 mg Oral $Given 5/16/25 1807)   sodium chloride 0.9% BOLUS 1,000 mL (1,000 mLs Intravenous $New Bag 5/16/25 1935)   ketorolac (TORADOL) injection 15 mg (15 mg Intravenous $Given 5/16/25 1932)   ondansetron (ZOFRAN) injection 4 mg (4 mg Intravenous $Given 5/16/25 1806)   sodium chloride 0.9% BOLUS 1,000 mL (1,000 mLs Intravenous $New Bag 5/16/25 1807)       Procedures   Procedures     Discussion of Management   None    ED Course   ED Course as of 05/16/25 1942   Fri May 16, 2025   1917 Recheck. Feeling improved but with persistent tremors and palpitations       Additional Documentation  None    Medical Decision Making / Diagnosis     CMS Diagnoses: None    MIPS   None               MDM   Jacquie Hnederson is a 35 year old female who presents to the ED with the above syndrome.  Quite tachycardic when she checked in the heart rate is more in the 110s in the room.  Placed on cardiac telemetry.  Ordered CIWA protocol.  Her abdominal exam is benign.  No hematemesis  or melena to suggest variceal bleeds or gastritis.  Labs are reassuring with normal electrolytes.  Does have low-grade fevers here as well as sore throat, dysuria.  Pending strep, viral panel, urinalysis.  should she require antibiotics I think she is likely still stable for transfer to a detox facility given her normal white blood cell count, normal blood pressure.  Is not significantly clinically intoxicated though measuring serum EtOH at 0.30.  Not meeting criteria for hold at this time as she is voluntarily requesting detox and clinically sober.    Disposition   Care of the patient was transferred to my colleague Dr. Gray pending medical clearance for detox.     Diagnosis     ICD-10-CM    1. Alcohol withdrawal, uncomplicated (H)  F10.930       2. Sore throat  J02.9                  DO Alaina Shelley Ellen,   05/16/25 1942       Destini Montgomery DO  05/16/25 2107

## 2025-05-17 NOTE — ED PROVIDER NOTES
Took over care of the patient at handoff pending flu, COVID, RSV, strep, and urinalysis.  Patient is here today in the emergency department for alcohol withdrawal and is pending discharge to detox.    On reevaluation, the patient states that she would like to go home, she does not want to go to detox.  Patient is refusing a urinalysis.  She denies any urinary symptoms.  I did advise her that she is very tachycardic and would advise that she stay in the emergency department and go to detox.  The patient refuses.  She is clinically sober does appear to be in withdrawal.  She is not slurring her speech.  She ambulates without difficulty.  She is answering questions appropriately.  She would like to go home and she does have a sober ride home.  I advised her that as long as she has a sober ride home that she can leave.    On reevaluation, the patient states that she does not want to wait for her sober ride and she would like to leave AMA.  I did inform her that with her alcohol level being high when she got here that I cannot discharge her with concern of her driving home.  She states that she would like to leave and will not drive.  I did advise her that she did drive here with an elevated alcohol level and I cannot discharge her from the emergency department without seeing a sober ride for her.  I did place an THOMAS, as I do think that she needs a sober ride home and if she tries to leave she needs to be held.  I did advise that she could take an Uber home as she is clinically sober.    Patient orders in uber.  She is walked to the [facility] and seen getting into an Uber.         Karely Gray MD  05/16/25 8069

## 2025-05-17 NOTE — DISCHARGE INSTRUCTIONS
Please return to the emergency department if your symptoms worsen, you experience seizures, uncontrollable nausea or vomiting.

## 2025-05-19 LAB
ATRIAL RATE - MUSE: 96 BPM
DIASTOLIC BLOOD PRESSURE - MUSE: NORMAL MMHG
INTERPRETATION ECG - MUSE: NORMAL
P AXIS - MUSE: -7 DEGREES
PR INTERVAL - MUSE: 128 MS
QRS DURATION - MUSE: 92 MS
QT - MUSE: 356 MS
QTC - MUSE: 449 MS
R AXIS - MUSE: 38 DEGREES
SYSTOLIC BLOOD PRESSURE - MUSE: NORMAL MMHG
T AXIS - MUSE: 14 DEGREES
VENTRICULAR RATE- MUSE: 96 BPM

## 2025-05-31 ENCOUNTER — TELEPHONE (OUTPATIENT)
Dept: BEHAVIORAL HEALTH | Facility: CLINIC | Age: 35
End: 2025-05-31

## 2025-05-31 ENCOUNTER — APPOINTMENT (OUTPATIENT)
Dept: GENERAL RADIOLOGY | Facility: CLINIC | Age: 35
DRG: 897 | End: 2025-05-31
Attending: EMERGENCY MEDICINE
Payer: COMMERCIAL

## 2025-05-31 ENCOUNTER — HOSPITAL ENCOUNTER (INPATIENT)
Facility: CLINIC | Age: 35
LOS: 2 days | Discharge: HOME | DRG: 897 | End: 2025-06-02
Attending: EMERGENCY MEDICINE | Admitting: PSYCHIATRY & NEUROLOGY
Payer: COMMERCIAL
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
## 2025-05-31 DIAGNOSIS — F10.220 ACUTE ALCOHOLIC INTOXICATION IN ALCOHOLISM WITHOUT COMPLICATION (H): ICD-10-CM

## 2025-05-31 DIAGNOSIS — F10.10 ALCOHOL ABUSE: ICD-10-CM

## 2025-05-31 DIAGNOSIS — F10.239 ALCOHOL DEPENDENCE WITH WITHDRAWAL WITH COMPLICATION (H): ICD-10-CM

## 2025-05-31 DIAGNOSIS — F10.930 ALCOHOL WITHDRAWAL, UNCOMPLICATED (H): Primary | ICD-10-CM

## 2025-05-31 LAB
ALBUMIN SERPL BCG-MCNC: 4.9 G/DL (ref 3.5–5.2)
ALCOHOL BREATH TEST: 0.22 (ref 0–0.01)
ALP SERPL-CCNC: 85 U/L (ref 40–150)
ALT SERPL W P-5'-P-CCNC: 31 U/L (ref 0–50)
AMPHETAMINES UR QL SCN: ABNORMAL
ANION GAP SERPL CALCULATED.3IONS-SCNC: 20 MMOL/L (ref 7–15)
AST SERPL W P-5'-P-CCNC: 56 U/L (ref 0–45)
BARBITURATES UR QL SCN: ABNORMAL
BASOPHILS # BLD AUTO: 0.1 10E3/UL (ref 0–0.2)
BASOPHILS NFR BLD AUTO: 2 %
BENZODIAZ UR QL SCN: ABNORMAL
BILIRUB SERPL-MCNC: 0.6 MG/DL
BUN SERPL-MCNC: 6.4 MG/DL (ref 6–20)
BZE UR QL SCN: ABNORMAL
CALCIUM SERPL-MCNC: 8.8 MG/DL (ref 8.8–10.4)
CANNABINOIDS UR QL SCN: ABNORMAL
CHLORIDE SERPL-SCNC: 94 MMOL/L (ref 98–107)
CREAT SERPL-MCNC: 0.63 MG/DL (ref 0.51–0.95)
EGFRCR SERPLBLD CKD-EPI 2021: >90 ML/MIN/1.73M2
EOSINOPHIL # BLD AUTO: 0 10E3/UL (ref 0–0.7)
EOSINOPHIL NFR BLD AUTO: 0 %
ERYTHROCYTE [DISTWIDTH] IN BLOOD BY AUTOMATED COUNT: 13 % (ref 10–15)
ETHANOL SERPL-MCNC: 0.22 G/DL
FENTANYL UR QL: ABNORMAL
GGT SERPL-CCNC: 25 U/L (ref 5–36)
GLUCOSE SERPL-MCNC: 104 MG/DL (ref 70–99)
HCG UR QL: NEGATIVE
HCO3 SERPL-SCNC: 26 MMOL/L (ref 22–29)
HCT VFR BLD AUTO: 37.2 % (ref 35–47)
HGB BLD-MCNC: 13.2 G/DL (ref 11.7–15.7)
IMM GRANULOCYTES # BLD: 0 10E3/UL
IMM GRANULOCYTES NFR BLD: 0 %
INTERNAL QC OK POCT: NORMAL
LIPASE SERPL-CCNC: 24 U/L (ref 13–60)
LYMPHOCYTES # BLD AUTO: 1 10E3/UL (ref 0.8–5.3)
LYMPHOCYTES NFR BLD AUTO: 30 %
MAGNESIUM SERPL-MCNC: 1.8 MG/DL (ref 1.7–2.3)
MCH RBC QN AUTO: 30.2 PG (ref 26.5–33)
MCHC RBC AUTO-ENTMCNC: 35.5 G/DL (ref 31.5–36.5)
MCV RBC AUTO: 85 FL (ref 78–100)
MONOCYTES # BLD AUTO: 0.3 10E3/UL (ref 0–1.3)
MONOCYTES NFR BLD AUTO: 9 %
NEUTROPHILS # BLD AUTO: 1.9 10E3/UL (ref 1.6–8.3)
NEUTROPHILS NFR BLD AUTO: 59 %
NRBC # BLD AUTO: 0 10E3/UL
NRBC BLD AUTO-RTO: 0 /100
OPIATES UR QL SCN: ABNORMAL
PCP QUAL URINE (ROCHE): ABNORMAL
PLATELET # BLD AUTO: 202 10E3/UL (ref 150–450)
POCT KIT EXPIRATION DATE: NORMAL
POCT KIT LOT NUMBER: NORMAL
POTASSIUM SERPL-SCNC: 3.8 MMOL/L (ref 3.4–5.3)
PROT SERPL-MCNC: 8.4 G/DL (ref 6.4–8.3)
RBC # BLD AUTO: 4.37 10E6/UL (ref 3.8–5.2)
SODIUM SERPL-SCNC: 140 MMOL/L (ref 135–145)
TROPONIN T SERPL HS-MCNC: <6 NG/L
TSH SERPL DL<=0.005 MIU/L-ACNC: 0.99 UIU/ML (ref 0.3–4.2)
WBC # BLD AUTO: 3.2 10E3/UL (ref 4–11)

## 2025-05-31 PROCEDURE — 250N000011 HC RX IP 250 OP 636: Performed by: PSYCHIATRY & NEUROLOGY

## 2025-05-31 PROCEDURE — 96374 THER/PROPH/DIAG INJ IV PUSH: CPT | Performed by: EMERGENCY MEDICINE

## 2025-05-31 PROCEDURE — 250N000013 HC RX MED GY IP 250 OP 250 PS 637: Performed by: EMERGENCY MEDICINE

## 2025-05-31 PROCEDURE — 82077 ASSAY SPEC XCP UR&BREATH IA: CPT | Performed by: EMERGENCY MEDICINE

## 2025-05-31 PROCEDURE — 258N000003 HC RX IP 258 OP 636: Performed by: EMERGENCY MEDICINE

## 2025-05-31 PROCEDURE — 81025 URINE PREGNANCY TEST: CPT | Performed by: EMERGENCY MEDICINE

## 2025-05-31 PROCEDURE — 82977 ASSAY OF GGT: CPT | Performed by: PSYCHIATRY & NEUROLOGY

## 2025-05-31 PROCEDURE — 85025 COMPLETE CBC W/AUTO DIFF WBC: CPT | Performed by: EMERGENCY MEDICINE

## 2025-05-31 PROCEDURE — 83690 ASSAY OF LIPASE: CPT | Performed by: EMERGENCY MEDICINE

## 2025-05-31 PROCEDURE — 82565 ASSAY OF CREATININE: CPT | Performed by: EMERGENCY MEDICINE

## 2025-05-31 PROCEDURE — 99207 PR NO CHARGE LOS: CPT | Performed by: CLINICAL NURSE SPECIALIST

## 2025-05-31 PROCEDURE — 93005 ELECTROCARDIOGRAM TRACING: CPT | Performed by: EMERGENCY MEDICINE

## 2025-05-31 PROCEDURE — 80307 DRUG TEST PRSMV CHEM ANLYZR: CPT | Performed by: EMERGENCY MEDICINE

## 2025-05-31 PROCEDURE — 84443 ASSAY THYROID STIM HORMONE: CPT | Performed by: PSYCHIATRY & NEUROLOGY

## 2025-05-31 PROCEDURE — 99285 EMERGENCY DEPT VISIT HI MDM: CPT | Performed by: EMERGENCY MEDICINE

## 2025-05-31 PROCEDURE — 96361 HYDRATE IV INFUSION ADD-ON: CPT | Performed by: EMERGENCY MEDICINE

## 2025-05-31 PROCEDURE — HZ2ZZZZ DETOXIFICATION SERVICES FOR SUBSTANCE ABUSE TREATMENT: ICD-10-PCS | Performed by: PSYCHIATRY & NEUROLOGY

## 2025-05-31 PROCEDURE — 128N000004 HC R&B CD ADULT

## 2025-05-31 PROCEDURE — 250N000013 HC RX MED GY IP 250 OP 250 PS 637

## 2025-05-31 PROCEDURE — 93010 ELECTROCARDIOGRAM REPORT: CPT | Performed by: EMERGENCY MEDICINE

## 2025-05-31 PROCEDURE — 250N000011 HC RX IP 250 OP 636: Performed by: EMERGENCY MEDICINE

## 2025-05-31 PROCEDURE — 83735 ASSAY OF MAGNESIUM: CPT | Performed by: EMERGENCY MEDICINE

## 2025-05-31 PROCEDURE — 250N000013 HC RX MED GY IP 250 OP 250 PS 637: Performed by: PSYCHIATRY & NEUROLOGY

## 2025-05-31 PROCEDURE — 99285 EMERGENCY DEPT VISIT HI MDM: CPT | Mod: 25 | Performed by: EMERGENCY MEDICINE

## 2025-05-31 PROCEDURE — 36415 COLL VENOUS BLD VENIPUNCTURE: CPT | Performed by: EMERGENCY MEDICINE

## 2025-05-31 PROCEDURE — 71046 X-RAY EXAM CHEST 2 VIEWS: CPT

## 2025-05-31 PROCEDURE — 84484 ASSAY OF TROPONIN QUANT: CPT | Performed by: EMERGENCY MEDICINE

## 2025-05-31 RX ORDER — MAGNESIUM HYDROXIDE/ALUMINUM HYDROXICE/SIMETHICONE 120; 1200; 1200 MG/30ML; MG/30ML; MG/30ML
30 SUSPENSION ORAL EVERY 4 HOURS PRN
Status: DISCONTINUED | OUTPATIENT
Start: 2025-05-31 | End: 2025-06-02 | Stop reason: HOSPADM

## 2025-05-31 RX ORDER — LORAZEPAM 1 MG/1
1-4 TABLET ORAL EVERY 30 MIN PRN
Status: DISCONTINUED | OUTPATIENT
Start: 2025-05-31 | End: 2025-05-31

## 2025-05-31 RX ORDER — FOLIC ACID 1 MG/1
1 TABLET ORAL DAILY
Status: DISCONTINUED | OUTPATIENT
Start: 2025-05-31 | End: 2025-06-02 | Stop reason: HOSPADM

## 2025-05-31 RX ORDER — HYDROXYZINE HYDROCHLORIDE 25 MG/1
25 TABLET, FILM COATED ORAL EVERY 4 HOURS PRN
Status: DISCONTINUED | OUTPATIENT
Start: 2025-05-31 | End: 2025-06-02 | Stop reason: HOSPADM

## 2025-05-31 RX ORDER — DIAZEPAM 5 MG/1
5-20 TABLET ORAL EVERY 30 MIN PRN
Status: DISCONTINUED | OUTPATIENT
Start: 2025-05-31 | End: 2025-06-02 | Stop reason: HOSPADM

## 2025-05-31 RX ORDER — POLYETHYLENE GLYCOL 3350 17 G/17G
17 POWDER, FOR SOLUTION ORAL DAILY PRN
Status: DISCONTINUED | OUTPATIENT
Start: 2025-05-31 | End: 2025-06-02 | Stop reason: HOSPADM

## 2025-05-31 RX ORDER — ONDANSETRON 4 MG/1
4 TABLET, ORALLY DISINTEGRATING ORAL EVERY 6 HOURS PRN
Status: DISCONTINUED | OUTPATIENT
Start: 2025-05-31 | End: 2025-06-02 | Stop reason: HOSPADM

## 2025-05-31 RX ORDER — ACETAMINOPHEN 325 MG/1
650 TABLET ORAL EVERY 4 HOURS PRN
Status: DISCONTINUED | OUTPATIENT
Start: 2025-05-31 | End: 2025-06-02 | Stop reason: HOSPADM

## 2025-05-31 RX ORDER — ATENOLOL 50 MG/1
50 TABLET ORAL DAILY PRN
Status: DISCONTINUED | OUTPATIENT
Start: 2025-05-31 | End: 2025-06-02 | Stop reason: HOSPADM

## 2025-05-31 RX ORDER — TRAZODONE HYDROCHLORIDE 50 MG/1
50 TABLET ORAL
Status: DISCONTINUED | OUTPATIENT
Start: 2025-05-31 | End: 2025-06-01

## 2025-05-31 RX ORDER — MULTIPLE VITAMINS W/ MINERALS TAB 9MG-400MCG
1 TAB ORAL DAILY
Status: DISCONTINUED | OUTPATIENT
Start: 2025-05-31 | End: 2025-06-02 | Stop reason: HOSPADM

## 2025-05-31 RX ORDER — LOPERAMIDE HYDROCHLORIDE 2 MG/1
2 CAPSULE ORAL 4 TIMES DAILY PRN
Status: DISCONTINUED | OUTPATIENT
Start: 2025-05-31 | End: 2025-06-02 | Stop reason: HOSPADM

## 2025-05-31 RX ORDER — ONDANSETRON 2 MG/ML
4 INJECTION INTRAMUSCULAR; INTRAVENOUS ONCE
Status: COMPLETED | OUTPATIENT
Start: 2025-05-31 | End: 2025-05-31

## 2025-05-31 RX ADMIN — Medication 1 TABLET: at 13:07

## 2025-05-31 RX ADMIN — ALUMINUM HYDROXIDE, MAGNESIUM HYDROXIDE, AND DIMETHICONE 30 ML: 200; 20; 200 SUSPENSION ORAL at 17:59

## 2025-05-31 RX ADMIN — ONDANSETRON 4 MG: 2 INJECTION INTRAMUSCULAR; INTRAVENOUS at 14:24

## 2025-05-31 RX ADMIN — FOLIC ACID 1 MG: 1 TABLET ORAL at 13:07

## 2025-05-31 RX ADMIN — LORAZEPAM 4 MG: 1 TABLET ORAL at 14:15

## 2025-05-31 RX ADMIN — THIAMINE HCL TAB 100 MG 100 MG: 100 TAB at 13:07

## 2025-05-31 RX ADMIN — ATENOLOL 50 MG: 50 TABLET ORAL at 17:24

## 2025-05-31 RX ADMIN — ACETAMINOPHEN 650 MG: 325 TABLET ORAL at 21:34

## 2025-05-31 RX ADMIN — LORAZEPAM 3 MG: 1 TABLET ORAL at 15:03

## 2025-05-31 RX ADMIN — SODIUM CHLORIDE 1000 ML: 0.9 INJECTION, SOLUTION INTRAVENOUS at 14:37

## 2025-05-31 RX ADMIN — LORAZEPAM 4 MG: 1 TABLET ORAL at 13:07

## 2025-05-31 RX ADMIN — ONDANSETRON 4 MG: 4 TABLET, ORALLY DISINTEGRATING ORAL at 21:32

## 2025-05-31 RX ADMIN — LORAZEPAM 3 MG: 1 TABLET ORAL at 16:31

## 2025-05-31 RX ADMIN — LORAZEPAM 2 MG: 1 TABLET ORAL at 20:15

## 2025-05-31 RX ADMIN — LORAZEPAM 2 MG: 1 TABLET ORAL at 21:21

## 2025-05-31 RX ADMIN — LORAZEPAM 2 MG: 1 TABLET ORAL at 17:58

## 2025-05-31 RX ADMIN — DIAZEPAM 10 MG: 5 TABLET ORAL at 22:30

## 2025-05-31 RX ADMIN — HYDROXYZINE HYDROCHLORIDE 25 MG: 25 TABLET, FILM COATED ORAL at 17:24

## 2025-05-31 ASSESSMENT — ACTIVITIES OF DAILY LIVING (ADL)
DRESSING/BATHING_DIFFICULTY: NO
ADLS_ACUITY_SCORE: 30
DOING_ERRANDS_INDEPENDENTLY_DIFFICULTY: NO
CONCENTRATING,_REMEMBERING_OR_MAKING_DECISIONS_DIFFICULTY: NO
DRESS: INDEPENDENT
ADLS_ACUITY_SCORE: 30
ADLS_ACUITY_SCORE: 56
NUMBER_OF_TIMES_PATIENT_HAS_FALLEN_WITHIN_LAST_SIX_MONTHS: 3
ADLS_ACUITY_SCORE: 30
ADLS_ACUITY_SCORE: 56
FALL_HISTORY_WITHIN_LAST_SIX_MONTHS: YES
DIFFICULTY_COMMUNICATING: NO
HEARING_DIFFICULTY_OR_DEAF: NO
ADLS_ACUITY_SCORE: 56
ORAL_HYGIENE: INDEPENDENT
CHANGE_IN_FUNCTIONAL_STATUS_SINCE_ONSET_OF_CURRENT_ILLNESS/INJURY: NO
TOILETING_ISSUES: NO
ADLS_ACUITY_SCORE: 56
ADLS_ACUITY_SCORE: 56
HYGIENE/GROOMING: INDEPENDENT
ADLS_ACUITY_SCORE: 30
DIFFICULTY_EATING/SWALLOWING: NO
WEAR_GLASSES_OR_BLIND: NO
WALKING_OR_CLIMBING_STAIRS_DIFFICULTY: NO

## 2025-05-31 NOTE — PHARMACY-ADMISSION MEDICATION HISTORY
Admission Medication History Completed by Pharmacy    See [employer] Admission Navigator for allergy information, preferred outpatient pharmacy and prior to admission medications.     Medication History Sources:   Prescription fill history via Epic Surescripts report  Patient interview (ED bedside)    Pertinent Information or Changes Made to PTA Med List:  Writer reviewed PTA medications with patient.  Patient states she can only tolerate zertraline 25mg daily dose.  Titrated up to 50mg about a month ago but couldn't tolerate so decreased back to 25mg daily.      Removed: Antabuse 250mg daily (pt denied taking, last filled March 2025)    Prior to Admission medications    Medication Sig Last Dose       folic acid (FOLVITE) 1 MG tablet Take 1 tablet (1 mg) by mouth daily.     More than a month       melatonin 5 MG tablet Take 5 mg by mouth nightly as needed for sleep     Past Month       multivitamin w/minerals (THERA-VIT-M) tablet     Take 1 tablet by mouth daily. More than a month       sertraline (ZOLOFT) 50 MG tablet Take 0.5tabs or 25mg daily for 2 weeks, if tolerating can increase to 50mg daily or 1 tablet daily     Past Week       thiamine (B-1) 100 MG tablet Take 1 tablet (100 mg) by mouth daily.     Past Month         Date completed: 05/31/25    Medication history completed by:   Alina Bridges, Pharm.D., [facility]  Behavioral Health Inpatient Pharmacist  [facility] ([facility]) Emergency Department  Contact via Swing by Swing or Epic Messaging

## 2025-05-31 NOTE — ED TRIAGE NOTES
Patient states she has been drinking a lot for the past five days (Mix of vodka and wine) unsure of amount. Last drink was like an hour before arriving in the ED.

## 2025-05-31 NOTE — ED PROVIDER NOTES
"    [facility] EMERGENCY DEPARTMENT ([facility])    5/31/25      ED PROVIDER NOTE   History     Chief Complaint   Patient presents with    Alcohol Intoxication    Chest Pain     The history is provided by the patient and medical records.     Jacquie Henderson is a 35 year old female with a history of anxiety and alcohol use disorder who presents to the ED for chest pain and alcohol intoxication. Patient states that she has been binge drinking for 5 days. She does not recall how much she has drank in the entirety but has been drinking a mix of vodka and wine. Patient does note she her last drinks were 2-3 hours ago and had 5 drinks today. Patient endorses waking up this morning and was shaking which was unusual. Patient is unsure if this was a seizure. She had developed onset chest pain as well afterwards.  She also states she believes her blood pressure is \"through the roof.\" Patient notes tripping in bath room a couple of days ago and hitting her back of the head on floor.  Patient is concerned for withdrawal and is requesting detox. Patient denies hallucinations and recent use of recreational drugs.      Per chart review patient was seen at [facility] on 5/16/2025 for palpitations, chest pain, and mild alcohol intoxication. It was noted she was placed on cardiac telemetry. Her heart rate was 110s in the room. CIWA protocol was ordered. It was noted she had been recently treated for UTI. Otherwise stable and did not require antibiotics. Labs were reassuring with normal electrolytes. Patient refused detox.      Past Medical History  Past Medical History:   Diagnosis Date    Anxiety     Substance abuse (H)      No past surgical history on file.  disulfiram (ANTABUSE) 250 MG tablet  folic acid (FOLVITE) 1 MG tablet  melatonin 5 MG tablet  multivitamin w/minerals (THERA-VIT-M) tablet  oxymetazoline (AFRIN) 0.05 % nasal spray  sertraline (ZOLOFT) 50 MG tablet  thiamine (B-1) 100 MG tablet      Allergies "   Allergen Reactions    Gluten Meal GI Disturbance     Family History  No family history on file.  Social History   Social History     Tobacco Use    Smoking status: Never    Smokeless tobacco: Never   Substance Use Topics    Alcohol use: Yes     Comment: binges    Drug use: Yes     Types: Marijuana     Comment: States she uses gummies      A medically appropriate review of systems was performed with pertinent positives and negatives noted in the HPI, and all other systems negative.    Physical Exam   BP: (!) 158/110  Pulse: (!) 122  Temp: 98.3  F (36.8  C)  Resp: 20  Weight: 61.1 kg (134 lb 9.6 oz)  SpO2: 94 %  Physical Exam  Physical Exam   Constitutional:   well nourished, well developed, appears tremulous  HENT:   Head: Normocephalic and atraumatic.   Eyes: Conjunctivae are normal. Pupils are equal, round, and reactive to light.   Cardiovascular: tachycardic without murmurs or gallops  Pulmonary/Chest: Clear to auscultation bilaterally, with no wheezes or retractions. No respiratory distress.  GI: Soft with good bowel sounds.  Non-tender, non-distended, with no guarding, no rebound, no peritoneal signs.   Back:  No bony or CVA tenderness   Musculoskeletal:  no edema  Skin: Skin is warm and dry. No rash noted.   Neurological: alert and oriented to person, place, and time. Nonfocal exam, speech is clear, patient is tremulous and anxious  Psychiatric:  anxious mood and affect.     ED Course, Procedures, & Data      Procedures              EKG Interpretation:      Interpreted by Rama Martinez MD  Time reviewed:1240 pm   Symptoms at time of EKG: see hpi   Rhythm: Normal sinus  and Sinus tachycardia  Rate: 110-120  Axis: Normal  Ectopy: None  Conduction: Normal  ST Segments/ T Waves: No acute ischemic changes, nonspecific ST-T wave changes  Q Waves: None  Comparison to prior: No old EKG available    Clinical Impression: sinus tachycardia, rate of 114 bpm nonspecific ST-T wave changes     Results for orders placed  or performed during the hospital encounter of 05/31/25   XR Chest 2 Views     Status: None    Narrative    EXAM: XR CHEST 2 VIEWS  LOCATION: [facility]  DATE: 5/31/2025    INDICATION: chest pain  COMPARISON: 5/16/2025      Impression    IMPRESSION: No infiltrate, pleural effusion or pneumothorax. The cardiac and mediastinal silhouettes are normal.   Comprehensive metabolic panel     Status: Abnormal   Result Value Ref Range    Sodium 140 135 - 145 mmol/L    Potassium 3.8 3.4 - 5.3 mmol/L    Carbon Dioxide (CO2) 26 22 - 29 mmol/L    Anion Gap 20 (H) 7 - 15 mmol/L    Urea Nitrogen 6.4 6.0 - 20.0 mg/dL    Creatinine 0.63 0.51 - 0.95 mg/dL    GFR Estimate >90 >60 mL/min/1.73m2    Calcium 8.8 8.8 - 10.4 mg/dL    Chloride 94 (L) 98 - 107 mmol/L    Glucose 104 (H) 70 - 99 mg/dL    Alkaline Phosphatase 85 40 - 150 U/L    AST 56 (H) 0 - 45 U/L    ALT 31 0 - 50 U/L    Protein Total 8.4 (H) 6.4 - 8.3 g/dL    Albumin 4.9 3.5 - 5.2 g/dL    Bilirubin Total 0.6 <=1.2 mg/dL   Lipase     Status: Normal   Result Value Ref Range    Lipase 24 13 - 60 U/L   Troponin T, High Sensitivity     Status: Normal   Result Value Ref Range    Troponin T, High Sensitivity <6 <=14 ng/L   Magnesium     Status: Normal   Result Value Ref Range    Magnesium 1.8 1.7 - 2.3 mg/dL   CBC with platelets and differential     Status: Abnormal   Result Value Ref Range    WBC Count 3.2 (L) 4.0 - 11.0 10e3/uL    RBC Count 4.37 3.80 - 5.20 10e6/uL    Hemoglobin 13.2 11.7 - 15.7 g/dL    Hematocrit 37.2 35.0 - 47.0 %    MCV 85 78 - 100 fL    MCH 30.2 26.5 - 33.0 pg    MCHC 35.5 31.5 - 36.5 g/dL    RDW 13.0 10.0 - 15.0 %    Platelet Count 202 150 - 450 10e3/uL    % Neutrophils 59 %    % Lymphocytes 30 %    % Monocytes 9 %    % Eosinophils 0 %    % Basophils 2 %    % Immature Granulocytes 0 %    NRBCs per 100 WBC 0 <1 /100    Absolute Neutrophils 1.9 1.6 - 8.3 10e3/uL    Absolute Lymphocytes 1.0 0.8 - 5.3 10e3/uL     Absolute Monocytes 0.3 0.0 - 1.3 10e3/uL    Absolute Eosinophils 0.0 0.0 - 0.7 10e3/uL    Absolute Basophils 0.1 0.0 - 0.2 10e3/uL    Absolute Immature Granulocytes 0.0 <=0.4 10e3/uL    Absolute NRBCs 0.0 10e3/uL   Ethanol Level Blood     Status: Abnormal   Result Value Ref Range    Ethanol Level Blood 0.22 (H) <=0.01 g/dL   EKG 12-lead, tracing only     Status: None (Preliminary result)   Result Value Ref Range    Systolic Blood Pressure  mmHg    Diastolic Blood Pressure  mmHg    Ventricular Rate 114 BPM    Atrial Rate 114 BPM    GA Interval 116 ms    QRS Duration 88 ms     ms    QTc 476 ms    P Axis 67 degrees    R AXIS 75 degrees    T Axis 39 degrees    Interpretation ECG       Sinus tachycardia  Nonspecific T wave abnormality  Abnormal ECG     Alcohol breath test POCT     Status: Abnormal   Result Value Ref Range    Alcohol Breath Test 0.216 (A) 0.00 - 0.01   CBC with platelets differential     Status: Abnormal    Narrative    The following orders were created for panel order CBC with platelets differential.  Procedure                               Abnormality         Status                     ---------                               -----------         ------                     CBC with platelets and ...[[id number]]  Abnormal            Final result                 Please view results for these tests on the individual orders.     Medications   LORazepam (ATIVAN) tablet 1-4 mg (4 mg Oral $Given 5/31/25 1415)   thiamine (B-1) tablet 100 mg (100 mg Oral $Given 5/31/25 1307)   folic acid (FOLVITE) tablet 1 mg (1 mg Oral $Given 5/31/25 1307)   multivitamin w/minerals (THERA-VIT-M) tablet 1 tablet (1 tablet Oral $Given 5/31/25 1307)   sodium chloride 0.9% BOLUS 1,000 mL (1,000 mLs Intravenous $New Bag 5/31/25 1437)   ondansetron (ZOFRAN) injection 4 mg (4 mg Intravenous $Given 5/31/25 1424)     Labs Ordered and Resulted from Time of ED Arrival to Time of ED Departure   COMPREHENSIVE METABOLIC PANEL - Abnormal        Result Value    Sodium 140      Potassium 3.8      Carbon Dioxide (CO2) 26      Anion Gap 20 (*)     Urea Nitrogen 6.4      Creatinine 0.63      GFR Estimate >90      Calcium 8.8      Chloride 94 (*)     Glucose 104 (*)     Alkaline Phosphatase 85      AST 56 (*)     ALT 31      Protein Total 8.4 (*)     Albumin 4.9      Bilirubin Total 0.6     CBC WITH PLATELETS AND DIFFERENTIAL - Abnormal    WBC Count 3.2 (*)     RBC Count 4.37      Hemoglobin 13.2      Hematocrit 37.2      MCV 85      MCH 30.2      MCHC 35.5      RDW 13.0      Platelet Count 202      % Neutrophils 59      % Lymphocytes 30      % Monocytes 9      % Eosinophils 0      % Basophils 2      % Immature Granulocytes 0      NRBCs per 100 WBC 0      Absolute Neutrophils 1.9      Absolute Lymphocytes 1.0      Absolute Monocytes 0.3      Absolute Eosinophils 0.0      Absolute Basophils 0.1      Absolute Immature Granulocytes 0.0      Absolute NRBCs 0.0     ETHANOL LEVEL BLOOD - Abnormal    Ethanol Level Blood 0.22 (*)    ALCOHOL BREATH TEST POCT - Abnormal    Alcohol Breath Test 0.216 (*)    LIPASE - Normal    Lipase 24     TROPONIN T, HIGH SENSITIVITY - Normal    Troponin T, High Sensitivity <6     MAGNESIUM - Normal    Magnesium 1.8     HCG QUALITATIVE PREGNANCY     XR Chest 2 Views   Final Result   IMPRESSION: No infiltrate, pleural effusion or pneumothorax. The cardiac and mediastinal silhouettes are normal.             Critical care was not performed.     Medical Decision Making  The patient's presentation was of high complexity (a chronic illness severe exacerbation, progression, or side effect of treatment).    The patient's evaluation involved:  ordering and/or review of 3+ test(s) in this encounter (see separate area of note for details)  independent interpretation of testing performed by another health professional (I independently reviewed the chest x-ray)    The patient's management necessitated moderate risk (prescription drug management  including medications given in the ED) and high risk (a decision regarding hospitalization).    Assessment & Plan        I have reviewed the nursing notes.   Emergency Department course:  The patient was seen and examined at 1232 pm in room 10.  EKG shows sinus tachycardia, rate of 114 bpm nonspecific ST-T wave changes.  She is quite hypertensive here  Breathalyzer is 0.216.  The patient is quite tremulous but I do not believe alcohol withdrawal is the cause of her tremulousness.  I placed her on the [facility] protocol for alcohol withdrawal and she was treated with multivitamins, folate and thiamine p.o.   Laboratory studies show negative troponin of less than 6.  CBC shows leukopenia, with a WBC of 3.2.  Comprehensive metabolic panel shows an elevated AST of 56 and elevated total protein of 8.4.  Lipase is normal at 24.  Magnesium is normal at 1.8.  Blood alcohol is 0.22.  Chest x-ray is unremarkable  Despite the patient's blood alcohol of 0.22, she is having significant withdrawal symptoms with nausea and tremulousness.  She initially scored 21 on the MSSA protocol.  I have been treating with Ativan.  She also received a normal saline bolus IV and Zofran IV.    Jacquie Henderson is a 35 year old female with a history of alcohol use disorder who presents with complaints of alcohol withdrawal, tremulousness and chest pain.  EKG shows tachycardia and troponin is negative.  Chest x-ray is also unremarkable.  Laboratory studies have an elevated AST consistent with heavy alcohol intake.  She is on the [facility] protocol for acute alcohol withdrawal.  She will be admitted here to the [facility] for detox.  . I have reviewed the findings, diagnosis, plan and need for follow up with the patient.    New Prescriptions    No medications on file       Final diagnoses:   Acute alcoholic intoxication in alcoholism without complication (H)   Americo EGAN Her, am serving as a trained medical scribe to document services personally  performed by Rama Martinez MD, based on the provider's statements to me.     I, Rama Martinez MD, was physically present and have reviewed and verified the accuracy of this note documented by Americo Garcia.   This note was created in part by the use of Dragon voice recognition dictation system. Inadvertent grammatical errors and typographical errors may still exist.  MD Rama Kenney MD  [facility] EMERGENCY DEPARTMENT  5/31/2025     Rama Martinez MD  05/31/25 1376

## 2025-05-31 NOTE — TELEPHONE ENCOUNTER
S: [facility] Mamadou , Provider Michelle calling at 2:56 PM with clinical on 35 year old/female presenting for alcohol detox.     B: Pt presents for ETOH detox.   Currently reports drinking vodka and wine/daily.   Patient reports last use was 2 PTA.  Pt ESTEPHANIA: 0.216   Pt  denies hx of DT  Pt  denies hx of seizures. Last seizure: N/A  Pt endorsing the following symptoms of withdrawal: Tremulous, Tachycardic, Anxious, and Perspiration   MSSA Score: 20    Pt denies acute mental health or medical concerns.   Pt denies other drug use: (!) ALCOHOL Amount/frequency: Alcohol    Does Pt have a detox care plan in [facility]? No  Does pt present with specific needs, assistive devices, or exclusionary criteria? None  Is the patient ambulating, eating and drinking in the ED? Yes    A: Pt meets criteria to be presented for IP detox admission. Patient is voluntary    COVID Symptoms: No  If yes, COVID test required   Utox: Ordered, not yet collected  Magnesium: WNL  CMP: Abnormalities: Anion Gap 20,Chloride 94,Glucose 104,AST 56,Protein Total 8.4  CBC: Abnormalities: WBC Count 3.2  HCG: Ordered, not yet collected     R: Patient cleared and ready for behavioral bed placement: Yes    Pt is meeting criteria for presentation to 3A/CD    Does Patient need a Transfer Center request created? No, Pt is located within [facility] ED, [facility] ED, or [place] ED     3:05 PM Joanned Theresa    3:11 PM Pt accepted to 3A/CD/Lori by Theresa.    3:13 PM 3A CRN is unavailable unit informed of pt in queue and will notify CRN once available.     3:16 PM Indicia complete. Pt added to admit board.    3:18 PM [facility] ED notified.

## 2025-05-31 NOTE — PROGRESS NOTES
05/31/25 2412   Patient Belongings   Did you bring any home meds/supplements to the hospital?  No   Patient Belongings other (see comments)  (storage bin, med room bin)   Patient Belongings Put in Hospital Secure Location (Security or Locker, etc.) other (see comments)   Belongings Search Yes   Clothing Search Yes   Second Staff Tere     Storage bin: shoes, cap, sweater, charging cord, stephanie pack  Med rooom bin: cell phone, gold colored necklace, card guzman, earbuds, key, env #[id number]: smart watch, $12 cash, [employer] gift card, Visa debit, [place] ID  A               Admission:  I am responsible for any personal items that are not sent to the safe or pharmacy.  [facility] is not responsible for loss, theft or damage of any property in my possession.    Signature:  _________________________________ Date: _______  Time: _____                                              Staff Signature:  ____________________________ Date: ________  Time: _____      2nd Staff person, if patient is unable/unwilling to sign:    Signature: ________________________________ Date: ________  Time: _____     Discharge:  [facility] has returned all of my personal belongings:    Signature: _________________________________ Date: ________  Time: _____                                          Staff Signature:  ____________________________ Date: ________  Time: _____

## 2025-05-31 NOTE — PROGRESS NOTES
Brief Psychiatry Note   Chart and labs reviewed. Patient meets criteria for 3A detox admission to detox from alcohol. Orders for admission placed.     Most Recent 2 LFT's:  Recent Labs   Lab Test 05/31/25  1255 05/16/25  1810   AST 56* 38   ALT 31 25   ALKPHOS 85 71   BILITOTAL 0.6 0.6       YOLI Mckeon CNP

## 2025-05-31 NOTE — PLAN OF CARE
"  Problem: Alcohol Withdrawal  Goal: Alcohol Withdrawal Symptom Control  Outcome: Progressing   Goal Outcome Evaluation: ongoing    S-(situation): Pt is a 35 y.o. female admitted to unit 3A from [facility] ED for alcohol detox.  Pt reports drinking 1/2 L vodka + plus (half bottle of 750ml) daily for the past 4 days.  Reports drinking on and off heavily prior to that.  States she has hx of remaining sober for months at a time in the past.  Etoh levels in ED: 0.216 & 0.22.  Denied further substance abuse.  Utox negative.      B-(background):   -Pt states she believes she had her first seizure this morning.  Reports she woke up and was \"very shakey, not like normal\"--details unclear per pt  -Denies hx of DT's  -Hx of anxiety, depression, PTSD, celiac disease and rheumatoid arthritis    -Reports hx of 1 prior detox, hospitalized x2 for alcohol abuse, denied IP/residential CD trx, denies IP MH admits    A-(assessment):   -MSSA 13 at 1756 (received ativan 2mg)  -MSSA 15 at 2006 (received ativan 2mg)  -MSSA 15 at 2110 (received ativan 2mg)  -MSSA 11 at 2218 (received 10mg Valium)--Valium now replaced Ativan for MSSA  -Provider Notification (@ 2129): Writer paged provider as pt has received a total of 20mg Ativan since 1307 today (2003-0991 time span) and continues to score 15 on the past 2 MSSA's.  Per provider, ativan was discontinued and was replaced with Valium for MSSA.    -Pt endorsed pain in her back, Rt elbow and h/a, rated 7/10, received PRN tylenol (relief reported).  States on Tues (5/27), she triped in her bathroom, hence her Rt elbow and neck pain  -Rated her anxiety 9/10 (received PRN hydroxyzine), depression 10/10  -Denied A/VH's  -Denied SI/SIB/HI.  Reports thoughts of SI when 15 yrs old (being the last time).  Denies hx of suicide attempts  -Pt slightly unsteady at admit, noted more steady as shift progressed  -No scheduled meds this shift  -Tachy this shift: 140 when she first arrived (tachy in ED as well, " "received PRN atenolol), pulse began down trending--was 72 at last MSSA  -PRN's: atenolol at 1724, maalox at 1759, zofran (hydroxyzine & tylenol above)   -Skin: pt denies concerns (denies bruises from recent fall).  No skin issues noted   -Reports she is currently in an Outpatient program at [facility] to address her alcohol abuse and grief--unclear per pt if this an actual Outpt trx or counseling  -Pt polite, cooperative and behaviorally controlled    @1713: BP (!) 145/84 (Patient Position: Sitting)   Pulse (!) 140   Temp 98.8  F (37.1  C) (Oral)   Resp 18   Ht 1.702 m (5' 7\")   Wt 60.8 kg (134 lb)   LMP 03/16/2025 (Approximate)   SpO2 98%   BMI 20.99 kg/m      @1754: /87 (BP Location: Left leg, Patient Position: Sitting, Cuff Size: Adult Regular)   Pulse 102   Temp 97.9  F (36.6  C) (Oral)   Resp 16   Ht 1.702 m (5' 7\")   Wt 60.8 kg (134 lb)   LMP 03/16/2025 (Approximate)   SpO2 99%   BMI 20.99 kg/m      @2110: /87 (Patient Position: Sitting, Cuff Size: Adult Regular)   Pulse 92   Temp 99.1  F (37.3  C) (Oral)   Resp 16   Ht 1.702 m (5' 7\")   Wt 60.8 kg (134 lb)   LMP 03/16/2025 (Approximate)   SpO2 100%   BMI 20.99 kg/m      @2218: /76 (BP Location: Right arm, Cuff Size: Adult Regular)   Pulse 74   Temp 98.2  F (36.8  C) (Oral)   Resp 16   Ht 1.702 m (5' 7\")   Wt 60.8 kg (134 lb)   LMP 03/16/2025 (Approximate)   SpO2 96%   BMI 20.99 kg/m      R-(recommendations): Pt on MSSA.  Internal med consult in place.  Pt on seizure and w/d precautions.  Seizure pads present at bedside.  Status 15.  Will continue to monitor and support plan of care.          "

## 2025-05-31 NOTE — TELEPHONE ENCOUNTER
R:    4:48 PM Received a call from a nurse saying asking when Pt will be going up to 3A and when report is going to happen. Intake said that they were going to follow up.     4:50 PM Called Unit 3A, per call with CRN; Report was already given and pt is already on the way to 3A.    4:51 PM Called [facility], per call with Nurse; They are already aware and said that they did not call to ask about report or transportation.

## 2025-06-01 ENCOUNTER — APPOINTMENT (OUTPATIENT)
Dept: CT IMAGING | Facility: CLINIC | Age: 35
End: 2025-06-01
Payer: COMMERCIAL

## 2025-06-01 LAB
ATRIAL RATE - MUSE: 114 BPM
CHOLEST SERPL-MCNC: 167 MG/DL
DIASTOLIC BLOOD PRESSURE - MUSE: NORMAL MMHG
EST. AVERAGE GLUCOSE BLD GHB EST-MCNC: 74 MG/DL
HBA1C MFR BLD: 4.2 %
HDLC SERPL-MCNC: 107 MG/DL
INTERPRETATION ECG - MUSE: NORMAL
LDLC SERPL CALC-MCNC: 54 MG/DL
NONHDLC SERPL-MCNC: 60 MG/DL
P AXIS - MUSE: 67 DEGREES
PR INTERVAL - MUSE: 116 MS
QRS DURATION - MUSE: 88 MS
QT - MUSE: 346 MS
QTC - MUSE: 476 MS
R AXIS - MUSE: 75 DEGREES
SYSTOLIC BLOOD PRESSURE - MUSE: NORMAL MMHG
T AXIS - MUSE: 39 DEGREES
TRIGL SERPL-MCNC: 28 MG/DL
VENTRICULAR RATE- MUSE: 114 BPM

## 2025-06-01 PROCEDURE — 83036 HEMOGLOBIN GLYCOSYLATED A1C: CPT | Performed by: PSYCHIATRY & NEUROLOGY

## 2025-06-01 PROCEDURE — 82465 ASSAY BLD/SERUM CHOLESTEROL: CPT | Performed by: PSYCHIATRY & NEUROLOGY

## 2025-06-01 PROCEDURE — 250N000013 HC RX MED GY IP 250 OP 250 PS 637: Performed by: PSYCHIATRY & NEUROLOGY

## 2025-06-01 PROCEDURE — 36415 COLL VENOUS BLD VENIPUNCTURE: CPT | Performed by: PSYCHIATRY & NEUROLOGY

## 2025-06-01 PROCEDURE — 70450 CT HEAD/BRAIN W/O DYE: CPT

## 2025-06-01 PROCEDURE — 128N000004 HC R&B CD ADULT

## 2025-06-01 PROCEDURE — 70450 CT HEAD/BRAIN W/O DYE: CPT | Mod: 26 | Performed by: RADIOLOGY

## 2025-06-01 PROCEDURE — 99221 1ST HOSP IP/OBS SF/LOW 40: CPT

## 2025-06-01 PROCEDURE — 99222 1ST HOSP IP/OBS MODERATE 55: CPT | Mod: AI

## 2025-06-01 PROCEDURE — 250N000013 HC RX MED GY IP 250 OP 250 PS 637: Performed by: EMERGENCY MEDICINE

## 2025-06-01 PROCEDURE — 250N000013 HC RX MED GY IP 250 OP 250 PS 637

## 2025-06-01 RX ORDER — CALCIUM CARBONATE 500 MG/1
500 TABLET, CHEWABLE ORAL DAILY PRN
Status: DISCONTINUED | OUTPATIENT
Start: 2025-06-01 | End: 2025-06-02 | Stop reason: HOSPADM

## 2025-06-01 RX ORDER — FOLIC ACID 1 MG/1
1 TABLET ORAL DAILY
Qty: 30 TABLET | Refills: 0 | Status: SHIPPED | OUTPATIENT
Start: 2025-06-01

## 2025-06-01 RX ORDER — MULTIPLE VITAMINS W/ MINERALS TAB 9MG-400MCG
1 TAB ORAL DAILY
Qty: 30 TABLET | Refills: 0 | Status: SHIPPED | OUTPATIENT
Start: 2025-06-01

## 2025-06-01 RX ORDER — SERTRALINE HYDROCHLORIDE 25 MG/1
25 TABLET, FILM COATED ORAL AT BEDTIME
Status: DISCONTINUED | OUTPATIENT
Start: 2025-06-01 | End: 2025-06-02 | Stop reason: HOSPADM

## 2025-06-01 RX ORDER — SERTRALINE HYDROCHLORIDE 25 MG/1
25 TABLET, FILM COATED ORAL AT BEDTIME
Qty: 30 TABLET | Refills: 0 | Status: SHIPPED | OUTPATIENT
Start: 2025-06-01 | End: 2025-06-02

## 2025-06-01 RX ORDER — LANOLIN ALCOHOL/MO/W.PET/CERES
100 CREAM (GRAM) TOPICAL DAILY
Qty: 30 TABLET | Refills: 0 | Status: SHIPPED | OUTPATIENT
Start: 2025-06-01

## 2025-06-01 RX ADMIN — Medication 1 TABLET: at 08:46

## 2025-06-01 RX ADMIN — HYDROXYZINE HYDROCHLORIDE 25 MG: 25 TABLET, FILM COATED ORAL at 08:47

## 2025-06-01 RX ADMIN — ACETAMINOPHEN 650 MG: 325 TABLET ORAL at 20:20

## 2025-06-01 RX ADMIN — ACETAMINOPHEN 650 MG: 325 TABLET ORAL at 08:46

## 2025-06-01 RX ADMIN — SERTRALINE HYDROCHLORIDE 25 MG: 25 TABLET ORAL at 21:20

## 2025-06-01 RX ADMIN — THIAMINE HCL TAB 100 MG 100 MG: 100 TAB at 08:46

## 2025-06-01 RX ADMIN — FOLIC ACID 1 MG: 1 TABLET ORAL at 08:46

## 2025-06-01 RX ADMIN — Medication 5 MG: at 21:20

## 2025-06-01 RX ADMIN — DIAZEPAM 10 MG: 5 TABLET ORAL at 04:12

## 2025-06-01 ASSESSMENT — ACTIVITIES OF DAILY LIVING (ADL)
ADLS_ACUITY_SCORE: 30

## 2025-06-01 NOTE — PLAN OF CARE
Problem: Alcohol Withdrawal  Goal: Alcohol Withdrawal Symptom Control  Outcome: Progressing     Problem: Sleep Disturbance  Goal: Adequate Sleep/Rest  Outcome: Progressing   Goal Outcome Evaluation:    The Patient's MSSA scores were 6 and 8. He received Valium 10 mg and Trazodone for withdrawal symptoms and sleep difficulties once. The Team conducted safety checks every 15 minutes, and there were no issues.

## 2025-06-01 NOTE — H&P
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 completed in the ED.  Blood work was completed and showed mildly elevated AST: 56.  Alcohol breath test was elevated: 0.216.  And urine drug screening completed was positive for benzodiazepines and negative for all other substances; patient did receive lorazepam in the ED prior to completion of urine drug screening.  EKG completed 5/31/2025 and results indicated sinus tachycardia with nonspecific T wave abnormality, QTc: 476.  Chest x-ray unremarkable.  Patient was placed on MSSA protocol with lorazepam to treat EtOH withdrawal.  Patient received normal saline fluid bolus and Zofran in the ED. Patient was evaluated by provider in the ED and determined to be medically stable.  Patient subsequently admitted voluntarily to inpatient detox unit 10N with attending Dr. Jason for further psychiatric stabilization.  Upon admission, patient placed on status 15 monitoring.  Patient also placed on precautions: Seizure precautions and withdrawal precautions.    At time of admission, MSSA protocol with Valium utilized to monitor and treat EtOH withdrawal symptoms.  PTA sertraline restarted at 25 mg p.o. at bedtime; " scheduled at bedtime per patient preference.  Patient reports she is currently established in an outpatient grief therapy group through [facility] and has an outpatient psychiatry provider.  Patient does report that she would be open to receiving information about community groups that she could attend in the community; patient reports that due to her busy work schedule and already being in a therapy group she would not have time to complete a substance use disorder treatment program.  Patient expresses wanting to discharge as soon as out of detox; specifically reports that she hopes to leave by tomorrow in the early afternoon in order to attend her outpatient group.    Writer ordered head CT to evaluate for intracranial pathology as patient reported prior to admission falling and hitting her head.  Results show no acute intracranial pathology however did identify frontoparietal predominant parenchymal volume loss.     DIAGNOSIS   Principal Problem:    Alcohol withdrawal  Alcohol use disorder, severe, dependence  Generalized anxiety disorder, severe, with panic attacks  Depression, unspecified    Active Problem List:  Patient Active Problem List   Diagnosis    Alcohol withdrawal (H)    Alcohol withdrawal syndrome without complication (H)    Ketosis (H)    Alcoholic intoxication without complication    Vomiting, unspecified vomiting type, unspecified whether nausea present    Alcohol withdrawal, uncomplicated (H)    Alcohol dependence with withdrawal with complication (H)    Acute alcoholic intoxication in alcoholism without complication (H)       Clinically Significant Risk Factors Present on Admission          # Hypochloremia: Lowest Cl = 94 mmol/L in last 2 days, will monitor as appropriate     # Anion Gap Metabolic Acidosis: Highest Anion Gap = 20 mmol/L in last 2 days, will monitor and treat as appropriate                                PLAN   1. Education given regarding diagnostic and treatment  options with risks, benefits and alternatives and adequate verbalization of understanding.  2. Admitted 5/31/2025.  MSSA protocol with Valium to monitor and treat EtOH withdrawal symptoms.  Precautions placed: Seizure precautions, withdrawal precautions.  3. Medications: 6/1/2025: PTA medications reviewed.  Continue sertraline 25 mg p.o. at bedtime (scheduled at bedtime per patient preference) to target anxiety and depression symptoms  Continue folic acid 1 mg p.o. daily, indication: Vitamin supplement for alcohol use disorder  Continue melatonin 5 mg p.o. at bedtime to target sleep  Continue multivitamin 1 tablet p.o. daily, indication: Vitamin supplement for alcohol use disorder  Continue thiamine 100 mg tablet, 1 tablet PO daily, indication: Vitamin supplement for alcohol use disorder  4. Medications:  Hospital  PRN diazepam 5-20 mg p.o. every 30 minutes as needed, dose according to patient's MSSA score  PRN hydroxyzine 25 mg p.o. every 4 hours as needed for anxiety symptoms  PRN Imodium 2 mg p.o. 4 times daily as needed for loose stools  PRN Zofran 4 mg p.o. every 6 hours as needed for nausea, vomiting  5. Consultations:  Internal medicine team consulted at time of admission for medical comanagement of EtOH withdrawal  6. Structure and Supervision  Unit 3A.  7.   is following in regards to collecting and reviewing collateral information, referrals and disposition planning.  Legal: Voluntary  Referrals: TBD  Care Coordination: Per unit CTC  Placement: TBD  Anticipated Discharge: 3-5 days     Further treatment programming to be determined throughout the hospital course.        Risk Assessment: [facility] RISK ASSESSMENT: Patient able to contract for safety and Patient on precautions    This note was created with help of Dragon dictation system. Grammatical / typing errors are not intentional.    Justyna López, APRN CNP       CERTIFICATION   Initial Certification I certify that the inpatient  psychiatric facility admission was medically necessary for treatment which could   reasonably be expected to improve the patient s condition.                                       I estimate 3-5 days of hospitalization is necessary for proper treatment of the patient. My plans for post-hospital care for this patient are TBD.                                       Justyna López, YOLI CNP     -     6/1/2025     -     12:00 PM

## 2025-06-01 NOTE — PLAN OF CARE
"Goal Outcome Evaluation:    Problem: Alcohol Withdrawal  Goal: Alcohol Withdrawal Symptom Control  Outcome: Progressing     Patient has been withdrawn and isolative. She endorsed anxiety 2/10. Denied depression, SI, SIB, and hallucinations.     Patient went off unit for a CT scan due to a recent fall--awaiting results.    MSSA ar 4pm was 5; patient denied having withdrawal symptoms.  /87 (BP Location: Left arm, Patient Position: Sitting, Cuff Size: Adult Regular)   Pulse 98   Temp 98.3  F (36.8  C) (Oral)   Resp 16   Ht 1.702 m (5' 7\")   Wt 60.8 kg (134 lb)   SpO2 100%   BMI 20.99 kg/m      MSSA at 8pm was 5; patient continue to deny having withdrawal symptoms..  BP (!) 131/98 (BP Location: Left arm)   Pulse 90   Temp 98.3  F (36.8  C) (Temporal)   Resp 16   Ht 1.702 m (5' 7\")   Wt 60.8 kg (134 lb)   SpO2 100%   BMI 20.99 kg/m      Last valium was give at 4:12am on 6/1/25.    Patient later complained about having a headache 4/10; 650mg of tylenol was administered.    We'll continue to monitor.  "

## 2025-06-01 NOTE — PLAN OF CARE
"  Problem: Alcohol Withdrawal  Goal: Alcohol Withdrawal Symptom Control  Outcome: Progressing  Intervention: Minimize or Manage Alcohol Withdrawal Symptoms  Recent Flowsheet Documentation  Taken 6/1/2025 1100 by Priyanka Deleon, RN  Seizure Precautions: clutter-free environment maintained  Taken 6/1/2025 0800 by Priyanka Deleon, RN  Seizure Precautions: clutter-free environment maintained   Goal Outcome Evaluation:    Plan of Care Reviewed With: patient        MSSA 5/3, pt slightly anxious this AM regarding when she can leave, Hydroxyzine helpful , pt hoping to discharge to home, tomorrow when OOD, remains med compliant. Isolative to self. Given Tylenol prn for headache . Pt reports feeling a lot better today.  Blood pressure 130/89, pulse 83, temperature 97  F (36.1  C), temperature source Temporal, resp. rate 16, height 1.702 m (5' 7\"), weight 60.8 kg (134 lb), SpO2 99%, not currently breastfeeding.                "

## 2025-06-01 NOTE — PROVIDER NOTIFICATION
Writer paged provider as pt has received a total of 20mg Ativan since 1307 today (9028-3708 time span) and continues to score 15 on the past 2 MSSA's, inquiring about valium for MSSA.  Per provider, ativan was discontinued and was replaced with Valium for MSSA.

## 2025-06-01 NOTE — CONSULTS
"[facility]  Consult Note - Hospitalist Service  Date of Admission:  5/31/2025  Consult Requested by: psych  Reason for Consult: detox    Assessment & Plan   Jacquie Henderson is a 35 year old female admitted on 5/31/2025. She has PMH AUD, Anxiety, who presents for detox. Medicine consulted for comanagement.    Alcohol Abuse  Acute alcohol withdrawal  Possible WD seizure  Has been drinking approx 1/2L vodka + 1/2 750ml bottle daily, last drink was 5/31 morning. No history of DTs. Patient had episode of waking up and feeling \"very shaky, not like normal\" and had associated chest pain. No further seizure like episodes thus far in detox.  -- Continue MSSA  -- Folate, Multivitamin, Thiamine supplementation  -- Further management per Psychiatry     Chest pain, resolved   Elevated BP readings  Hx PVCs  Intermittent hypertension noted thus far with BP this morning 134/90 in the setting of active withdrawal. Patient reports she previously wore a holter monitor and was diagnosed with PVCs.  Patient also has noted hx of chest pain, usually during significant alcohol withdrawal. She was seen for this at OSH on 5/16 with negative workup. Patient also complained of chest pain in the ED. EKG with sinus tach and trop <6. CXR negative. Lipase WNL.   -- Continue MSSA  -- Notify medicine promptly if chest pain returns  -- Notify medicine if BP >180 despite adequate withdrawal treatment  -- PRN Tums for reflux which may be contributing to chest pain sx    GLF with head strike  Patient tripped on a rug and fell in the bathroom a few days PTA and reports hitting the back of her head on the floor. Denies LOC. CTH negative. Reports headache and intermittent double vision that resolves quickly, currently not present.  -- Notify medicine if any acute neurological changes occur, increased head pain, significant N/V, or decreased LOC    Leukopenia, mild  WBC on admit 3.2. Note similar readings in " the past. Likely 2/2 excessive alcohol use.     The patient's care was discussed with the Primary Team via this note    Currently patient is medically stable and medicine will sign off. Thank you for allowing us to be a part of this patients care. Please notify on call ARTHUR if any intercurrent medical issues arise.       Clinically Significant Risk Factors Present on Admission          # Hypochloremia: Lowest Cl = 94 mmol/L in last 2 days, will monitor as appropriate     # Anion Gap Metabolic Acidosis: Highest Anion Gap = 20 mmol/L in last 2 days, will monitor and treat as appropriate                           Carolina Cuellar PA-C  Hospitalist Service  Securely message with e-contratos (more info)  Text page via [facility] Paging/Directory   ______________________________________________________________________    Chief Complaint   detox    History is obtained from the patient    History of Present Illness   Jacquie Henderson is a 35 year old female admitted on 5/31/2025. She has PMH AUD, Anxiety, who presents for detox. Medicine consulted for comanagement.    Patient reports feeling well this morning, has been trying to hydrate. Denies N/V, abdominal pain, anxiety, tremors, sweats. No chest pain. Does have intermittent double vision since her head injury. No other acute medical concerns.      Past Medical History    Past Medical History:   Diagnosis Date    Anxiety     Substance abuse (H)        Past Surgical History   No past surgical history on file.    Medications   I have reviewed this patient's current medications       Review of Systems    The 5 point Review of Systems is negative other than noted in the HPI or here.       Physical Exam   Vital Signs: Temp: 98.1  F (36.7  C) Temp src: Oral BP: (!) 123/90 Pulse: 95   Resp: 16 SpO2: 99 % O2 Device: None (Room air)    Weight: 134 lbs 0 oz    Physical Exam  Vitals reviewed.   Constitutional:       General: She is not in acute distress.     Appearance: She is not diaphoretic.    Cardiovascular:      Rate and Rhythm: Normal rate and regular rhythm.      Heart sounds: No murmur heard.  Pulmonary:      Effort: Pulmonary effort is normal. No respiratory distress.      Breath sounds: No wheezing, rhonchi or rales.   Skin:     General: Skin is warm and dry.   Neurological:      Mental Status: She is alert and oriented to person, place, and time.           Medical Decision Making       45 MINUTES SPENT BY ME on the date of service doing chart review, history, exam, documentation & further activities per the note.      Data     I have personally reviewed the following data over the past 24 hrs:    3.2 (L)  \   13.2   / 202     140 94 (L) 6.4 /  104 (H)   3.8 26 0.63 \     ALT: 31 AST: 56 (H) AP: 85 TBILI: 0.6   ALB: 4.9 TOT PROTEIN: 8.4 (H) LIPASE: 24     Trop: <6 BNP: N/A     TSH: 0.99 T4: N/A A1C: 4.2       Imaging results reviewed over the past 24 hrs:   Recent Results (from the past 24 hours)   XR Chest 2 Views    Narrative    EXAM: XR CHEST 2 VIEWS  LOCATION: [facility]  DATE: 5/31/2025    INDICATION: chest pain  COMPARISON: 5/16/2025      Impression    IMPRESSION: No infiltrate, pleural effusion or pneumothorax. The cardiac and mediastinal silhouettes are normal.

## 2025-06-01 NOTE — PROGRESS NOTES
[address]     Daily Encounter: Met with team, discussed patient progress, discharge plan and any impediments to discharge.    Pt is not feeling well enough to engage in discharge or aftercare planning. Per chart review pt reported she has been attending therapy to address grief and alcohol abuse at [facility]. CM to follow up next day.    Insurance: [employer] ACCESS      Legal Status:  Voluntary    County:   File Number: NA  Start and expiration date of commitment: NA    SUDs Assessment Status: Will need for placement.      ROIs on file: None at this time.     Living Situation: Lives alone in [place].      Current Providers, Supports & Collateral:  Therapy at [facility]    Current Plan/Referral Status: Unknown     Safety Plan Status: Not yet started; patient unable to participate at this time      STAN Zafar  [address] - Adult Inpatient Addiction Psychiatry Unit

## 2025-06-02 VITALS
SYSTOLIC BLOOD PRESSURE: 131 MMHG | TEMPERATURE: 97 F | DIASTOLIC BLOOD PRESSURE: 90 MMHG | HEART RATE: 110 BPM | WEIGHT: 134 LBS | BODY MASS INDEX: 21.03 KG/M2 | HEIGHT: 67 IN | RESPIRATION RATE: 16 BRPM | OXYGEN SATURATION: 100 %

## 2025-06-02 PROCEDURE — 250N000013 HC RX MED GY IP 250 OP 250 PS 637: Performed by: PSYCHIATRY & NEUROLOGY

## 2025-06-02 PROCEDURE — 250N000013 HC RX MED GY IP 250 OP 250 PS 637: Performed by: EMERGENCY MEDICINE

## 2025-06-02 PROCEDURE — 99239 HOSP IP/OBS DSCHRG MGMT >30: CPT | Performed by: PSYCHIATRY & NEUROLOGY

## 2025-06-02 RX ORDER — DISULFIRAM 250 MG/1
250 TABLET ORAL DAILY
Qty: 30 TABLET | Refills: 3 | Status: SHIPPED | OUTPATIENT
Start: 2025-06-02

## 2025-06-02 RX ORDER — GABAPENTIN 100 MG/1
100 CAPSULE ORAL 3 TIMES DAILY PRN
Qty: 60 CAPSULE | Refills: 1 | Status: SHIPPED | OUTPATIENT
Start: 2025-06-02

## 2025-06-02 RX ADMIN — FOLIC ACID 1 MG: 1 TABLET ORAL at 08:25

## 2025-06-02 RX ADMIN — Medication 1 TABLET: at 08:23

## 2025-06-02 RX ADMIN — THIAMINE HCL TAB 100 MG 100 MG: 100 TAB at 08:22

## 2025-06-02 RX ADMIN — HYDROXYZINE HYDROCHLORIDE 25 MG: 25 TABLET, FILM COATED ORAL at 08:25

## 2025-06-02 ASSESSMENT — ACTIVITIES OF DAILY LIVING (ADL)
ADLS_ACUITY_SCORE: 30
HYGIENE/GROOMING: INDEPENDENT
ORAL_HYGIENE: INDEPENDENT
ADLS_ACUITY_SCORE: 30
DRESS: INDEPENDENT
ADLS_ACUITY_SCORE: 30

## 2025-06-02 NOTE — PROGRESS NOTES
[address]     Daily Encounter: Met with team, discussed patient progress, discharge plan and any impediments to discharge.    Writer met with the patient to discuss discharge and aftercare planning. The patient stated that she is currently participating in therapy and grief IOP program at [facility] in [place]. She also mentioned having a therapy appointment scheduled for today at 3:00 p.m. The patient reported that her car is parked in the ramp and she plans to drive herself home before attending her therapy appointment.    Insurance: [employer] Access     Legal Status:  Voluntary    SUDs Assessment Status: Not needed.     ROIs on file: Nones.     Living Situation: Lives in [place], [place].     Current Providers, Supports & Collateral:  Therapist and grief IOP peers group at [facility].    Current Plan/Referral Status: Continue attending therapy and grief IOP at [facility].     Safety Plan Status: reviewed and updated with patient      STAN Ureña  [address] - Adult Inpatient Addiction Psychiatry Unit

## 2025-06-02 NOTE — DISCHARGE INSTRUCTIONS
Behavioral Discharge Planning and Instructions  THANK YOU FOR CHOOSING THE [facility]  3A  [phone]    Summary: You were admitted to Station 3A on 5/31/25  for detoxification from [place] . You were treated by provider Casie . A medical exam was performed that included lab work. You were You have met with a [facility]  and opted to continue attending grief IOP and therapy.  Please take care and make your recovery a daily priority, Jacquie!  It was a pleasure working with you and the entire treatment team here wishes you the very best in your recovery!     Recommendation:  You are recommended to abstain from the use of alcohol or other mood-altering chemicals. You are recommended to continue attending your grief IOP program at [facility] and follow their continuing care recommendations.    Main Diagnoses:    Possible WD Seizure  Chest pain Resolved  Elevated Bp Readings  Hx PVC's  Alcohol Abuse   GLF with Head Strike      Major Treatments, Procedures and Findings:  You have withdrawn from Alcohol   using Valium.  You have met with a [facility] counselor to develop a treatment plan for discharge.  You have had labs drawn and those results have been reviewed with you. Please take a copy of your lab work with you to your next primary care physician appointment.    Symptoms to Report:  If you experience more anxiety, confusion, sleeplessness, deep sadness or thoughts of suicide, notify your treatment team or notify your primary care provider. IF ANY OF THE SYMPTOMS YOU ARE EXPERIENCING ARE A MEDICAL EMERGENCY CALL 911 IMMEDIATELY.     If you or someone you know is struggling or in crisis, help is available.  Call or text 335 or chat  at [employer].org    Lifestyle Adjustment: Adjust your lifestyle to get enough sleep, relaxation, exercise and  good nutrition. Continue to develop healthy coping skills to decrease stress and promote a sober living environment. Do not use alcohol,  illegal drugs or addictive medications other than what is currently prescribed. AA, NA, and  Sponsor are excellent resources for support.     Disposition: Return home, attend therapy appointment, and continue attending grief IOP.    Facts about COVID19 at www.cdc.gov/COVID19 and www.[place].gov/covid19    Keeping hands clean is one of the most important steps we can take to avoid getting sick and spreading germs to others.  Please wash your hands frequently and lather with soap for at least 20 seconds!      Follow-up Appointment:   You are aware you should make a follow up appointment with your primary care doctor for medical and medication management       Recovery apps for your phone for educational purposes and to locate in person and zoom recovery meetings  Everything AA - educational purpose and víctor is a great resource  12 Step Toolkit - educational purpose learning about the 12 steps to recovery  Timpson Cloud - meeting víctor  AA  - meeting víctor  Meeting guide - meeting víctor  Quick NA meeting - meeting víctor  Sarah- has various apps    Resources:  Some AA/NA meetings are being held online however most have returned to in-person or a hybrid combination please check online to verify time.  Need Support Now? If you or someone you know is struggling or in crisis, help is available. Call or text 988 or chat [employer].org  AA meetings search for them at: https://aa-intergroup.org (worldwide meeting listings)  AA meetings for [place] area can be found online at: https://a[place].org (click local online meetings listings)  NA meetings for [place] area can be found online at: https://www.nam[place].org  (click find a meeting)  AA and NA Sponsors are excellent resources for support and you can find one at any support group meeting.   Alcoholics Anonymous (https://aa.org/): for information 24 hours/day  AA Intergroup service office in [place] (http://www.aastpaul.org/) 914.914.8157  AA Intergroup service office in [facility]  "[place]: [phone]. (http://www.a[place].org/)  Narcotics Anonymous (www.nam[place].org) (902) 540-2959  https://aafairviewriverside.org/meetings  SMART Recovery - self management for addiction recovery:  www.smartrecovery.org  Pathways ~ A Health Crisis Resource & Support Center:  784.662.8066.  https://prescribetoprevent.org/patient-education/videos/  http://www.harmreduction.org  Crisis Text Line  Text 781537  You will be connected with a trained live crisis counselor to provide support. Por espanol, texto  MIKE a 269088 o texto a 442-AYUDAME en WhatsApp  [place] Hope Line  1.161.SUICIDE [8602784]  [facility] 531-632-2565  Support Group:  AA/NA and Sponsor/support.  Fast Tracker  Linking people to mental health and substance use disorder resources  advisorCONNECT.[employer]   [place] Mental Health Warm Line  Peer to peer support  Monday thru Saturday, 12 pm to 10 pm  479.320.8045 or 4.078.576.4001  Text \"Support\" to 93868  National Mcalister on Mental Illness (QUINN)  492.105.3240 or 1888.QUINN.HELPS  Alcoholics Anonymous (www.alcoholics-anonymous.org): Check your phone book for your local chapter.  Suicide Awareness Voices of Education (SAVE) (www.save.org): 030-318-FRNF (9483)  National Suicide Prevention Line (www.mentalhealthmn.org): 596-422-ABAD (3242)  Mental Health Consumer/Survivor Network of [place] (www.mhcsn.net): 599.446.9344 or 899-241-1139  Mental Health Association of [place] (www.mentalhealth.org): 770.826.6051 or 332-392-4930   Substance Abuse and Mental Health Services (www.samhsa.gov)  [place] Opioid Prevention Coalition: www.opioidcoalition.org  **Sober Fun Activities: www.soberMoonshootactivities.InfluxDB/[facility]//[facility] Recovery Connection (MRC)  MRC connects people seeking recovery to resources that help foster and sustain long-term recovery.  Whether you are seeking resources for treatment, transportation, housing, job training, education, health care or other " pathways to recovery, [facility] is a great place to start.  204.191.6426.  www.[facility].org    Great Pod casts for nutrition and wellness  Listen on Apple Podcasts  Dishing Up Nutrition   [employer]   Nutrition       Understand the connection between what you eat and how you feel. Hosted by licensed nutritionists and dietitians from [employer] we share practical, real-life solutions for healthier living through nutrition.       General Medication Instructions:   See your medication sheet(s) for instructions.   Take all medicines as directed.  Make no changes unless your doctor suggests them.   Go to all your doctor visits.  Be sure to have all your required lab tests. This way, your medicines can be refilled on time.  Do not use any drugs not prescribed by your provider.  AA/NA and Sponsors are excellent resources for support  Avoid alcohol.    Please Note: If you have any questions at anytime after you discharged please call [facility] detox unit 3A at [phone] to address your questions or concers.    Here are a list of additional numbers you can call if you are wanting to resume services through [facility]:  [facility] Assessment Intake: [phone]  [facility] Adult [facility] Intensive Outpatient  call: [phone]  [facility] Admissions [phone]    Recovery Clinic call: [phone]  [facility]: [phone]  Medical Records call: [phone]  Billing Department call: [phone]    Please remember to take all of your behavioral discharge planning and lab paperwork to any follow up appointments, it contains your lab results, diagnosis, medication list and discharge recommendations.      THANK YOU FOR CHOOSING [facility]

## 2025-06-02 NOTE — DISCHARGE SUMMARY
Psychiatric Discharge Summary    Jacquie Henderson MRN# [id number]   Age: 35 year old YOB: 1990     Date of Admission:  5/31/2025  Date of Discharge:  6/2/2025 11:14 AM  Admitting Physician:  YOLI Coombs  Discharge Physician:  Pardeep Jason MD          Events Leading to Hospitalization:      This is a 35 year old female with history of anxiety and alcohol use disorder who presented to [facility] ED on 5/31/2025 with chest pain and reporting alcohol intoxication.  Patient reported binge drinking for 5 days and that their last drink was 2-3 hours prior to arrival in the ED.  Patient reported waking up feeling tremulous however unsure if they experienced a seizure.  Patient reported subsequently developing chest pain and endorsed concern for high blood pressure.  Patient also reported tripping in the bathroom a couple days ago and hitting their head.  Medical workup was completed in the ED. Blood work was showed mildly elevated AST: 56.  Alcohol breath test was elevated: 0.216.  And urine drug screening completed was positive for benzodiazepines and negative for all other substances; patient did receive lorazepam in the ED prior to completion of urine drug screening.  EKG completed 5/31/2025 and results indicated sinus tachycardia with nonspecific T wave abnormality, QTc: 476.  Chest x-ray unremarkable.  Patient was placed on MSSA protocol with lorazepam to treat EtOH withdrawal.  Patient received normal saline fluid bolus and Zofran in the ED. Patient was evaluated by provider in the ED and determined to be medically stable.  Patient subsequently admitted voluntarily to inpatient detox unit 10N with attending Dr. Jason for further psychiatric stabilization.  Upon admission, patient placed on status 15 monitoring.  Patient also placed on precautions: Seizure precautions and withdrawal precautions.     Direct care provided by: YOLI West CNP     Upon examination,  "patient reports that she had some time off of work and subsequently engaged in binge drinking for 5 days which she describes as, \"drinking 7-8 drinks consisting of hard liquor per day total.\"  Patient reports that she had been sober prior to that for a week and a half however that she has been engaging in a cycle of binge drinking when having stretches of work off and that this has worsened over the past year and a half.  Patient denies any other substance use.  Patient currently denies any EtOH withdrawal symptoms; writer does observed slight tremor bilaterally and hands.  Patient reports she woke up this morning very tremulous and expressed concern that she may have had a seizure.  No seizure documented by nursing staff.  Patient denies any history of DTs.  Patient reports alcohol became a problem for them approximately 7 years ago after their mother .  Patient reports experiencing anxiety since childhood and also endorses currently experiencing elevated anxiety with, \"panic attacks 5-6 times per year where I have to go to the ED.\"  Patient reports depression which started in adulthood and occurred after her mother passed away.  Patient reports her grandfather also passed away and this contributed to current depression symptoms.  Patient reports she started taking sertraline for management of anxiety and depression a few weeks ago and that she feels is helpful for depression and anxiety symptoms.  Patient denies any SI or HI.  Patient is able to contract for safety.  Patient denies any current or history of auditory or visual hallucinations.  Patient reports her appetite is, \"getting better.\"  Patient denies any issues with bowel or bladder.  Vital signs reviewed and did have some slight hypertension however otherwise within normal limits.  Patient denies any acute medical concerns to writer.  Patient reports she is currently established in an outpatient grief therapy group through [facility] and " has an outpatient psychiatry provider.  Patient does report that she would be open to receiving information about community groups that she could attend in the community; patient reports that due to her busy work schedule and already being in a therapy group she would not have time to complete a substance use disorder treatment program.  Patient expresses wanting to discharge as soon as out of detox.      See Admission note for additional details.          Diagnoses:     Alcohol use disorder, severe  Alcohol withdrawal with unspecified complication   Panic disorder versus MEENU  MDD, recurrent, moderate versus substance-induced depression    Clinically Significant Risk Factors          # Hypochloremia: Lowest Cl = 94 mmol/L in last 2 days, will monitor as appropriate     # Anion Gap Metabolic Acidosis: Highest Anion Gap = 20 mmol/L in last 2 days, will monitor and treat as appropriate                                   Labs:        Lab Results   Component Value Date     05/31/2025    Lab Results   Component Value Date    CHLORIDE 94 05/31/2025    Lab Results   Component Value Date    BUN 6.4 05/31/2025      Lab Results   Component Value Date    POTASSIUM 3.8 05/31/2025    Lab Results   Component Value Date    CO2 26 05/31/2025    Lab Results   Component Value Date    CR 0.63 05/31/2025          Lab Results   Component Value Date    WBC 3.2 (L) 05/31/2025    HGB 13.2 05/31/2025    HCT 37.2 05/31/2025    MCV 85 05/31/2025     05/31/2025     Lab Results   Component Value Date    AST 56 (H) 05/31/2025    ALT 31 05/31/2025    GGT 25 05/31/2025    ALKPHOS 85 05/31/2025    BILITOTAL 0.6 05/31/2025     Lab Results   Component Value Date    TSH 0.99 05/31/2025            Consults:   Consultation during this admission received from internal medicine:  Jacquie Henderson is a 35 year old female admitted on 5/31/2025. She has PMH AUD, Anxiety, who presents for detox. Medicine consulted for comanagement.     Alcohol  "Abuse  Acute alcohol withdrawal  Possible WD seizure  Has been drinking approx 1/2L vodka + 1/2 750ml bottle daily, last drink was 5/31 morning. No history of DTs. Patient had episode of waking up and feeling \"very shaky, not like normal\" and had associated chest pain. No further seizure like episodes thus far in detox.  -- Continue MSSA  -- Folate, Multivitamin, Thiamine supplementation  -- Further management per Psychiatry      Chest pain, resolved   Elevated BP readings  Hx PVCs  Intermittent hypertension noted thus far with BP this morning 134/90 in the setting of active withdrawal. Patient reports she previously wore a holter monitor and was diagnosed with PVCs.  Patient also has noted hx of chest pain, usually during significant alcohol withdrawal. She was seen for this at OSH on 5/16 with negative workup. Patient also complained of chest pain in the ED. EKG with sinus tach and trop <6. CXR negative. Lipase WNL.   -- Continue MSSA  -- Notify medicine promptly if chest pain returns  -- Notify medicine if BP >180 despite adequate withdrawal treatment  -- PRN Tums for reflux which may be contributing to chest pain sx     GLF with head strike  Patient tripped on a rug and fell in the bathroom a few days PTA and reports hitting the back of her head on the floor. Denies LOC. CTH negative. Reports headache and intermittent double vision that resolves quickly, currently not present.  -- Notify medicine if any acute neurological changes occur, increased head pain, significant N/V, or decreased LOC     Leukopenia, mild  WBC on admit 3.2. Note similar readings in the past. Likely 2/2 excessive alcohol use.         Hospital Course:   Jacquie Henderson was admitted to Station 3A with attending Pardeep Jason MD as a voluntary patient. The patient was placed under status 15 (15 minute checks) to ensure patient safety.   MSSA protocol was initiated due to the patient's history of alcohol abuse and concern for withdrawal " symptoms.  CBC, BMP and utox obtained.    All outpatient medications were continued. Patient may increase Zoloft to 50mg Qday. Antabuse provided at discharge. Patient may take PRN. Gabapentin PRN prescribed at discharge for anxiety.     Jacquie Henderson did participate in groups and was visible in the milieu.     The patient's symptoms of withdrawal improved. Head CT report reviewed indicating no acute pathology.     Jacquie Henderson was released to home with IOP. At the time of discharge Jacquie Henderson was determined to not be a danger to herself or others. At the current time of discharge, the patient does not meet criteria for involuntary hospitalization. On the day of discharge, the patient reports that they do not have suicidal or homicidal ideation and would never hurt themselves or others. Steps taken to minimize risk include: assessing patient s behavior and thought process daily during hospital stay, discharging patient with adequate plan for follow up for mental and physical health and discussing safety plan of returning to the hospital should the patient ever have thoughts of harming themselves or others. Therefore, based on all available evidence including the factors cited above, the patient does not appear to be at imminent risk for self-harm, and is appropriate for outpatient level of care.           Discharge Medications:     Current Discharge Medication List        START taking these medications    Details   disulfiram (ANTABUSE) 250 MG tablet Take 1 tablet (250 mg) by mouth daily.  Qty: 30 tablet, Refills: 3    Comments: Okay to use PRN  Associated Diagnoses: Alcohol dependence with withdrawal with complication (H)      gabapentin (NEURONTIN) 100 MG capsule Take 1 capsule (100 mg) by mouth 3 times daily as needed for other (anxiety).  Qty: 60 capsule, Refills: 1    Associated Diagnoses: Alcohol dependence with withdrawal with complication (H)           CONTINUE these medications which have  CHANGED    Details   folic acid (FOLVITE) 1 MG tablet Take 1 tablet (1 mg) by mouth daily.  Qty: 30 tablet, Refills: 0    Associated Diagnoses: Alcohol abuse      multivitamin w/minerals (THERA-VIT-M) tablet Take 1 tablet by mouth daily.  Qty: 30 tablet, Refills: 0    Associated Diagnoses: Alcohol dependence with withdrawal with complication (H)      sertraline (ZOLOFT) 50 MG tablet Take 1 tablet (50 mg) by mouth at bedtime.  Qty: 30 tablet, Refills: 1    Associated Diagnoses: Alcohol withdrawal, uncomplicated (H)      thiamine (B-1) 100 MG tablet Take 1 tablet (100 mg) by mouth daily.  Qty: 30 tablet, Refills: 0    Associated Diagnoses: Alcohol abuse           CONTINUE these medications which have NOT CHANGED    Details   melatonin 5 MG tablet Take 5 mg by mouth nightly as needed for sleep                  Psychiatric Examination:   Appearance:  awake, alert and adequately groomed  Attitude:  cooperative  Eye Contact:  good  Mood:  anxious and better  Affect:  mood congruent  Speech:  clear, coherent  Psychomotor Behavior:  no evidence of tardive dyskinesia, dystonia, or tics  Thought Process:  goal oriented  Associations:  no loose associations  Thought Content:  no evidence of suicidal ideation or homicidal ideation and no evidence of psychotic thought  Insight:  fair  Judgment:  fair  Oriented to:  time, person, and place  Attention Span and Concentration:  intact  Recent and Remote Memory:  fair  Language: Able to read and write  Fund of Knowledge: appropriate  Muscle Strength and Tone: normal  Gait and Station: Normal         Discharge Plan:   Continue medications as above.     Recommendation:  You are recommended to abstain from the use of alcohol or other mood-altering chemicals. You are recommended to continue attending your grief IOP program at [facility] and follow their continuing care recommendations.     Attestation:    The patient was seen and evaluated by me. I spent more than 30 minutes on  discharge day activities. Pardeep Jason MD

## 2025-06-02 NOTE — PLAN OF CARE
Behavioral Team Discussion: (6/2/2025)    Continued Stay Criteria/Rationale: Patient admitted for Chemical Use Issues.  Plan: The following services will be provided to the patient; psychiatric assessment, medication management, therapeutic milieu, individual and group support, and skills groups.   Participants: 3A Provider: Pardeep Jason MD; 3A RN: Priyanka Deleon RN; 3A LADC: STAN Contreras.  Summary/Recommendation: Providers will assess today for treatment recommendations, discharge planning, and aftercare plans. LADC will meet with pt for discharge planning.   Medical/Physical: Per ER note, the patient reported experiencing chest pain and stated that she had fallen in the bathroom a couple days prior to admission, hitting the back of her head on the floor.  Precautions:   Behavioral Orders   Procedures    Code 1 - Restrict to Unit    Code 2     To CT    Routine Programming     As clinically indicated    Seizure precautions    Status 15     Every 15 minutes.    Withdrawal precautions     Rationale for change in precautions or plan: N/A  Progress: Initial.    ASAM Dimension Scale Ratings:  Dimension 1: 3 Client tolerates and garry with withdrawal discomfort poorly. Client has severe intoxication, such that the client endangers self or others, or intoxication has not abated with less intensive levels of services. Client displays severe signs and symptoms; or risk of severe, but manageable withdrawal; or withdrawal worsening despite detox at less intensive level.  Dimension 2: 1 Client tolerates and garry with physical discomfort and is able to get the services that the client needs.  Dimension 3: 2 Client has difficulty with impulse control and lacks coping skills. Client has thoughts of suicide or harm to others without means; however, the thoughts may interfere with participation in some treatment activities. Client has difficulty functioning in significant life areas. Client has moderate symptoms of  emotional, behavioral, or cognitive problems. Client is able to participate in most treatment activities.  Dimension 4: 2 Client displays verbal compliance, but lacks consistent behaviors; has low motivation for change; and is passively involved in treatment.  Dimension 5: 4 No awareness of the negative impact of mental health problems or substance abuse. No coping skills to arrest mental health or addiction illnesses, or prevent relapse.  Dimension 6: 4 Client has (A) Chronically antagonistic significant other, living environment, family, peer group or long-term criminal justice involvement that is harmful to recovery or treatment progress, or (B) Client has an actively antagonistic significant other, family, work, or living environment with immediate threat to the client's safety and well-being.

## 2025-06-02 NOTE — PLAN OF CARE
Problem: Alcohol Withdrawal  Goal: Alcohol Withdrawal Symptom Control  Outcome: Met     Problem: Sleep Disturbance  Goal: Adequate Sleep/Rest  Outcome: Progressing   Goal Outcome Evaluation:    The patient's MSSA score was 4, indicating that she did not qualify for medication to manage withdrawal symptoms. The team conducted safety checks every 15 minutes, and no safety issues were reported. The last dose of Valium was taken on June 1, 2025, at 4:12 AM. She has been discharged from the detox process since she has not taken Valium for the past 24 hours.

## 2025-06-04 ENCOUNTER — PATIENT OUTREACH (OUTPATIENT)
Dept: CARE COORDINATION | Facility: CLINIC | Age: 35
End: 2025-06-04
Payer: COMMERCIAL

## 2025-06-04 NOTE — PROGRESS NOTES
Connected Care Resource Center:   [facility] Resource Center Contact  [facility]/Voicemail     Clinical Data: Post-Discharge Outreach     Outreach attempted x 2.  Left message on patient's voicemail, providing [facility]'s central phone number of [phone] ([phone]) for questions/concerns and/or to schedule an appt with an [facility] provider, if they do not have a PCP.      Plan:  [facility] will do no further outreaches at this time.       HANNAH Lazar  Connected Care Resource [place], [facility]    *Connected Care Resource Team does NOT follow patient ongoing. Referrals are identified based on internal discharge reports and the outreach is to ensure patient has an understanding of their discharge instructions.

## 2025-07-17 ENCOUNTER — HOSPITAL ENCOUNTER (EMERGENCY)
Facility: CLINIC | Age: 35
Discharge: HOME | End: 2025-07-17
Attending: EMERGENCY MEDICINE
Payer: COMMERCIAL

## 2025-07-17 ENCOUNTER — APPOINTMENT (OUTPATIENT)
Dept: CT IMAGING | Facility: CLINIC | Age: 35
End: 2025-07-17
Attending: EMERGENCY MEDICINE
Payer: COMMERCIAL

## 2025-07-17 VITALS
WEIGHT: 135.14 LBS | RESPIRATION RATE: 20 BRPM | HEIGHT: 68 IN | HEART RATE: 106 BPM | BODY MASS INDEX: 20.48 KG/M2 | DIASTOLIC BLOOD PRESSURE: 92 MMHG | OXYGEN SATURATION: 99 % | TEMPERATURE: 98.2 F | SYSTOLIC BLOOD PRESSURE: 139 MMHG

## 2025-07-17 DIAGNOSIS — N39.0 RECURRENT UTI: ICD-10-CM

## 2025-07-17 DIAGNOSIS — E83.42 HYPOMAGNESEMIA: ICD-10-CM

## 2025-07-17 DIAGNOSIS — F10.930 ALCOHOL WITHDRAWAL SYNDROME WITHOUT COMPLICATION (H): ICD-10-CM

## 2025-07-17 LAB
ALBUMIN SERPL BCG-MCNC: 4.9 G/DL (ref 3.5–5.2)
ALBUMIN UR-MCNC: ABNORMAL G/DL
ALP SERPL-CCNC: 65 U/L (ref 40–150)
ALT SERPL W P-5'-P-CCNC: 30 U/L (ref 0–50)
ANION GAP SERPL CALCULATED.3IONS-SCNC: 29 MMOL/L (ref 7–15)
APPEARANCE UR: ABNORMAL
AST SERPL W P-5'-P-CCNC: 55 U/L (ref 0–45)
ATRIAL RATE - MUSE: 114 BPM
BASOPHILS # BLD AUTO: 0.1 10E3/UL (ref 0–0.2)
BASOPHILS NFR BLD AUTO: 1 %
BILIRUB SERPL-MCNC: 0.9 MG/DL
BILIRUB UR QL STRIP: ABNORMAL
BUN SERPL-MCNC: 13.3 MG/DL (ref 6–20)
CALCIUM SERPL-MCNC: 8.7 MG/DL (ref 8.8–10.4)
CHLORIDE SERPL-SCNC: 92 MMOL/L (ref 98–107)
COLOR UR AUTO: ABNORMAL
CREAT SERPL-MCNC: 0.73 MG/DL (ref 0.51–0.95)
DIASTOLIC BLOOD PRESSURE - MUSE: NORMAL MMHG
EGFRCR SERPLBLD CKD-EPI 2021: >90 ML/MIN/1.73M2
EOSINOPHIL # BLD AUTO: 0 10E3/UL (ref 0–0.7)
EOSINOPHIL NFR BLD AUTO: 0 %
ERYTHROCYTE [DISTWIDTH] IN BLOOD BY AUTOMATED COUNT: 14.6 % (ref 10–15)
ETHANOL SERPL-MCNC: 0.21 G/DL
GLUCOSE SERPL-MCNC: 82 MG/DL (ref 70–99)
GLUCOSE UR STRIP-MCNC: ABNORMAL MG/DL
HCG SERPL QL: NEGATIVE
HCO3 BLDV-SCNC: 21 MMOL/L (ref 21–28)
HCO3 SERPL-SCNC: 18 MMOL/L (ref 22–29)
HCT VFR BLD AUTO: 38.2 % (ref 35–47)
HGB BLD-MCNC: 12.7 G/DL (ref 11.7–15.7)
HGB UR QL STRIP: ABNORMAL
HYALINE CASTS: 8 /LPF
IMM GRANULOCYTES # BLD: 0 10E3/UL
IMM GRANULOCYTES NFR BLD: 0 %
INTERPRETATION ECG - MUSE: NORMAL
KETONES UR STRIP-MCNC: ABNORMAL MG/DL
LACTATE BLD-SCNC: 5.2 MMOL/L (ref 0.7–2)
LACTATE SERPL-SCNC: 3.5 MMOL/L (ref 0.7–2)
LEUKOCYTE ESTERASE UR QL STRIP: ABNORMAL
LYMPHOCYTES # BLD AUTO: 0.8 10E3/UL (ref 0.8–5.3)
LYMPHOCYTES NFR BLD AUTO: 8 %
MAGNESIUM SERPL-MCNC: 1.6 MG/DL (ref 1.7–2.3)
MCH RBC QN AUTO: 28.5 PG (ref 26.5–33)
MCHC RBC AUTO-ENTMCNC: 33.2 G/DL (ref 31.5–36.5)
MCV RBC AUTO: 86 FL (ref 78–100)
MONOCYTES # BLD AUTO: 0.4 10E3/UL (ref 0–1.3)
MONOCYTES NFR BLD AUTO: 4 %
MUCOUS THREADS #/AREA URNS LPF: PRESENT /LPF
NEUTROPHILS # BLD AUTO: 8.6 10E3/UL (ref 1.6–8.3)
NEUTROPHILS NFR BLD AUTO: 87 %
NITRATE UR QL: ABNORMAL
NRBC # BLD AUTO: 0 10E3/UL
NRBC BLD AUTO-RTO: 0 /100
P AXIS - MUSE: 65 DEGREES
PCO2 BLDV: 35 MM HG (ref 40–50)
PH BLDV: 7.39 [PH] (ref 7.32–7.43)
PH UR STRIP: ABNORMAL [PH]
PLATELET # BLD AUTO: 339 10E3/UL (ref 150–450)
PO2 BLDV: 45 MM HG (ref 25–47)
POTASSIUM SERPL-SCNC: 3.8 MMOL/L (ref 3.4–5.3)
PR INTERVAL - MUSE: 122 MS
PROT SERPL-MCNC: 7.9 G/DL (ref 6.4–8.3)
QRS DURATION - MUSE: 90 MS
QT - MUSE: 348 MS
QTC - MUSE: 479 MS
R AXIS - MUSE: 48 DEGREES
RBC # BLD AUTO: 4.46 10E6/UL (ref 3.8–5.2)
RBC URINE: 37 /HPF
SAO2 % BLDV: 81 % (ref 70–75)
SODIUM SERPL-SCNC: 139 MMOL/L (ref 135–145)
SP GR UR STRIP: ABNORMAL
SQUAMOUS EPITHELIAL: 3 /HPF
SYSTOLIC BLOOD PRESSURE - MUSE: NORMAL MMHG
T AXIS - MUSE: 41 DEGREES
TRANSITIONAL EPI: 2 /HPF
TROPONIN T SERPL HS-MCNC: <6 NG/L
UROBILINOGEN UR STRIP-MCNC: ABNORMAL MG/DL
VENTRICULAR RATE- MUSE: 114 BPM
WBC # BLD AUTO: 9.9 10E3/UL (ref 4–11)
WBC URINE: >182 /HPF

## 2025-07-17 PROCEDURE — 83605 ASSAY OF LACTIC ACID: CPT | Performed by: EMERGENCY MEDICINE

## 2025-07-17 PROCEDURE — 250N000013 HC RX MED GY IP 250 OP 250 PS 637: Performed by: EMERGENCY MEDICINE

## 2025-07-17 PROCEDURE — 250N000009 HC RX 250: Performed by: EMERGENCY MEDICINE

## 2025-07-17 PROCEDURE — 36415 COLL VENOUS BLD VENIPUNCTURE: CPT | Performed by: EMERGENCY MEDICINE

## 2025-07-17 PROCEDURE — 96375 TX/PRO/DX INJ NEW DRUG ADDON: CPT

## 2025-07-17 PROCEDURE — 84703 CHORIONIC GONADOTROPIN ASSAY: CPT | Performed by: EMERGENCY MEDICINE

## 2025-07-17 PROCEDURE — 250N000011 HC RX IP 250 OP 636: Performed by: EMERGENCY MEDICINE

## 2025-07-17 PROCEDURE — 96374 THER/PROPH/DIAG INJ IV PUSH: CPT | Mod: 59

## 2025-07-17 PROCEDURE — 96361 HYDRATE IV INFUSION ADD-ON: CPT

## 2025-07-17 PROCEDURE — 258N000003 HC RX IP 258 OP 636: Performed by: EMERGENCY MEDICINE

## 2025-07-17 PROCEDURE — 83735 ASSAY OF MAGNESIUM: CPT | Performed by: EMERGENCY MEDICINE

## 2025-07-17 PROCEDURE — 82077 ASSAY SPEC XCP UR&BREATH IA: CPT | Performed by: EMERGENCY MEDICINE

## 2025-07-17 PROCEDURE — 96376 TX/PRO/DX INJ SAME DRUG ADON: CPT

## 2025-07-17 PROCEDURE — 81003 URINALYSIS AUTO W/O SCOPE: CPT | Performed by: EMERGENCY MEDICINE

## 2025-07-17 PROCEDURE — 99285 EMERGENCY DEPT VISIT HI MDM: CPT | Mod: 25 | Performed by: EMERGENCY MEDICINE

## 2025-07-17 PROCEDURE — 87186 SC STD MICRODIL/AGAR DIL: CPT | Performed by: EMERGENCY MEDICINE

## 2025-07-17 PROCEDURE — 93005 ELECTROCARDIOGRAM TRACING: CPT

## 2025-07-17 PROCEDURE — 83605 ASSAY OF LACTIC ACID: CPT

## 2025-07-17 PROCEDURE — 74177 CT ABD & PELVIS W/CONTRAST: CPT

## 2025-07-17 PROCEDURE — 84484 ASSAY OF TROPONIN QUANT: CPT | Performed by: EMERGENCY MEDICINE

## 2025-07-17 PROCEDURE — 85004 AUTOMATED DIFF WBC COUNT: CPT | Performed by: EMERGENCY MEDICINE

## 2025-07-17 PROCEDURE — 80053 COMPREHEN METABOLIC PANEL: CPT | Performed by: EMERGENCY MEDICINE

## 2025-07-17 RX ORDER — ONDANSETRON 2 MG/ML
4 INJECTION INTRAMUSCULAR; INTRAVENOUS ONCE
Status: COMPLETED | OUTPATIENT
Start: 2025-07-17 | End: 2025-07-17

## 2025-07-17 RX ORDER — DIAZEPAM 10 MG/2ML
5 INJECTION, SOLUTION INTRAMUSCULAR; INTRAVENOUS ONCE
Status: COMPLETED | OUTPATIENT
Start: 2025-07-17 | End: 2025-07-17

## 2025-07-17 RX ORDER — IOPAMIDOL 755 MG/ML
500 INJECTION, SOLUTION INTRAVASCULAR ONCE
Status: COMPLETED | OUTPATIENT
Start: 2025-07-17 | End: 2025-07-17

## 2025-07-17 RX ORDER — SULFAMETHOXAZOLE AND TRIMETHOPRIM 800; 160 MG/1; MG/1
1 TABLET ORAL 2 TIMES DAILY
Qty: 10 TABLET | Refills: 0 | Status: SHIPPED | OUTPATIENT
Start: 2025-07-17 | End: 2025-07-19

## 2025-07-17 RX ORDER — FAMOTIDINE 20 MG/1
20 TABLET, FILM COATED ORAL ONCE
Status: COMPLETED | OUTPATIENT
Start: 2025-07-17 | End: 2025-07-17

## 2025-07-17 RX ORDER — DIAZEPAM 5 MG/1
5 TABLET ORAL ONCE
Status: COMPLETED | OUTPATIENT
Start: 2025-07-17 | End: 2025-07-17

## 2025-07-17 RX ORDER — MAGNESIUM HYDROXIDE/ALUMINUM HYDROXICE/SIMETHICONE 120; 1200; 1200 MG/30ML; MG/30ML; MG/30ML
30 SUSPENSION ORAL ONCE
Status: COMPLETED | OUTPATIENT
Start: 2025-07-17 | End: 2025-07-17

## 2025-07-17 RX ORDER — FOLIC ACID 1 MG/1
1 TABLET ORAL ONCE
Status: COMPLETED | OUTPATIENT
Start: 2025-07-17 | End: 2025-07-17

## 2025-07-17 RX ORDER — SULFAMETHOXAZOLE AND TRIMETHOPRIM 800; 160 MG/1; MG/1
1 TABLET ORAL ONCE
Status: COMPLETED | OUTPATIENT
Start: 2025-07-17 | End: 2025-07-17

## 2025-07-17 RX ORDER — MULTIPLE VITAMINS W/ MINERALS TAB 9MG-400MCG
1 TAB ORAL ONCE
Status: COMPLETED | OUTPATIENT
Start: 2025-07-17 | End: 2025-07-17

## 2025-07-17 RX ADMIN — IOPAMIDOL 68 ML: 755 INJECTION, SOLUTION INTRAVENOUS at 11:11

## 2025-07-17 RX ADMIN — ONDANSETRON 4 MG: 2 INJECTION, SOLUTION INTRAMUSCULAR; INTRAVENOUS at 09:28

## 2025-07-17 RX ADMIN — DIAZEPAM 5 MG: 5 TABLET ORAL at 11:45

## 2025-07-17 RX ADMIN — DIAZEPAM 5 MG: 5 TABLET ORAL at 10:45

## 2025-07-17 RX ADMIN — FOLIC ACID 1 MG: 1 TABLET ORAL at 14:01

## 2025-07-17 RX ADMIN — THIAMINE HCL TAB 100 MG 100 MG: 100 TAB at 14:01

## 2025-07-17 RX ADMIN — SODIUM CHLORIDE 57 ML: 9 INJECTION, SOLUTION INTRAVENOUS at 11:11

## 2025-07-17 RX ADMIN — Medication 1 TABLET: at 14:01

## 2025-07-17 RX ADMIN — ONDANSETRON 4 MG: 2 INJECTION, SOLUTION INTRAMUSCULAR; INTRAVENOUS at 12:50

## 2025-07-17 RX ADMIN — ALUMINUM HYDROXIDE, MAGNESIUM HYDROXIDE, AND DIMETHICONE 30 ML: 200; 20; 200 SUSPENSION ORAL at 09:28

## 2025-07-17 RX ADMIN — SODIUM CHLORIDE 1000 ML: 0.9 INJECTION, SOLUTION INTRAVENOUS at 09:12

## 2025-07-17 RX ADMIN — SODIUM CHLORIDE 1000 ML: 0.9 INJECTION, SOLUTION INTRAVENOUS at 11:47

## 2025-07-17 RX ADMIN — DIAZEPAM 5 MG: 5 INJECTION INTRAMUSCULAR; INTRAVENOUS at 09:28

## 2025-07-17 RX ADMIN — DIAZEPAM 5 MG: 5 TABLET ORAL at 14:01

## 2025-07-17 RX ADMIN — SULFAMETHOXAZOLE AND TRIMETHOPRIM 1 TABLET: 800; 160 TABLET ORAL at 10:45

## 2025-07-17 RX ADMIN — FAMOTIDINE 20 MG: 20 TABLET, FILM COATED ORAL at 09:28

## 2025-07-17 ASSESSMENT — ACTIVITIES OF DAILY LIVING (ADL)
ADLS_ACUITY_SCORE: 56

## 2025-07-17 ASSESSMENT — LIFESTYLE VARIABLES
NAUSEA AND VOMITING: 3
TREMOR: MODERATE, WITH PATIENT'S ARMS EXTENDED
TOTAL SCORE: 11
AGITATION: NORMAL ACTIVITY
AGITATION: NORMAL ACTIVITY
ORIENTATION AND CLOUDING OF SENSORIUM: ORIENTED AND CAN DO SERIAL ADDITIONS
ORIENTATION AND CLOUDING OF SENSORIUM: ORIENTED AND CAN DO SERIAL ADDITIONS
PAROXYSMAL SWEATS: BARELY PERCEPTIBLE SWEATING, PALMS MOIST
VISUAL DISTURBANCES: NOT PRESENT
AUDITORY DISTURBANCES: NOT PRESENT
TREMOR: MODERATE, WITH PATIENT'S ARMS EXTENDED
NAUSEA AND VOMITING: 2
TOTAL SCORE: 15
PAROXYSMAL SWEATS: BARELY PERCEPTIBLE SWEATING, PALMS MOIST
ORIENTATION AND CLOUDING OF SENSORIUM: ORIENTED AND CAN DO SERIAL ADDITIONS
TREMOR: 5
VISUAL DISTURBANCES: NOT PRESENT
NAUSEA AND VOMITING: 2
HEADACHE, FULLNESS IN HEAD: MILD
AUDITORY DISTURBANCES: NOT PRESENT
HEADACHE, FULLNESS IN HEAD: MILD
PAROXYSMAL SWEATS: BARELY PERCEPTIBLE SWEATING, PALMS MOIST
ANXIETY: 2
AUDITORY DISTURBANCES: NOT PRESENT
AGITATION: NORMAL ACTIVITY
VISUAL DISTURBANCES: NOT PRESENT
ANXIETY: 2
ANXIETY: 3
HEADACHE, FULLNESS IN HEAD: MODERATE

## 2025-07-17 NOTE — DISCHARGE INSTRUCTIONS
Discharge Instructions  Urinary Tract Infection  You or your child have been diagnosed with a urinary tract infection, or UTI. The urinary tract includes the kidneys (which make urine/pee), ureters (the tubes that carry urine/pee from the kidneys to the bladder), the bladder (which stores urine/pee), and urethra (the tube that carries urine/pee out of the bladder). Urinary tract infections occur when bacteria travel up the urethra into the bladder (bladder infection) and, in some cases, from there into the kidneys (kidney infection).  Generally, every Emergency Department visit should have a follow-up clinic visit with either a primary or a specialty clinic/provider. Please follow-up as instructed by your emergency provider today.  Return to the Emergency Department if:  You or your child have severe back pain.  You or your child are vomiting (throwing up) so that you cannot take your medicine.  You or your child have a new fever (had not previously had a fever) over 101 F.  You or your child have confusion or are very weak, or feel very ill.  Your child seems much more ill, will not wake up, will not respond right, or is crying for a long time and will not calm down.  You or your child are showing signs of dehydration. These signs may include decreased urination (pee), dry mouth/gums/tongue, or decreased activity.    Follow-up with your provider:   Children under 24 months need to be seen by their regular provider within one week after a diagnosis of a UTI. It may be necessary to do some more tests to look at the child s kidney or bladder.  You should begin to feel better within 24 - 48 hours of starting your antibiotic; follow-up with your regular clinic/doctor/provider if this is not the case.    Treatment:   You will be treated with an antibiotic to kill the bacteria. We have to make an educated guess, based on what we know about common bacteria and antibiotics, as to which antibiotic will work for your  "infection. We will be correct most times but there will be some cases where the antibiotic chosen is not correct (see urine cultures below).  Take a pain medication such as acetaminophen (Tylenol ) or ibuprofen (Advil , Motrin , Nuprin ).  Phenazopyridine (Pyridium , Uristat ) is a prescription medication that numbs the bladder to reduce the burning pain of some UTIs.  The same medication is available in a non-prescription version (Azo-Standard , Urodol ). This medication will change the color of the urine and tears (usually blue or orange). If you wear contacts, do not wear them while taking this medication as they may be stained by the medication.    Urine Cultures:  If indicated, a urine culture may have been performed today. This test generally takes 24-48 hours to complete so the results are not known at this time. The results can confirm that an infection is present but also determine which antibiotic is effective for the specific bacteria that is causing the infection. If your urine culture shows that the antibiotic you were given today will not work to treat your infection, we will attempt to contact you to make arrangements to change the antibiotic. If the culture confirms that the antibiotic is effective for your infection, you will not be contacted. We often recommend follow-up with your regular physician/provider on the culture results regardless of this process.    Antibiotic Warning:   If you have been placed on antibiotics - watch for signs of allergic reaction.  These include rash, lip swelling, difficulty breathing, wheezing, and dizziness.  If you develop any of these symptoms, stop the antibiotic immediately and go to an emergency room or urgent care for evaluation.    Probiotics: If you have been given an antibiotic, you may want to also take a probiotic pill or eat yogurt with live cultures. Probiotics have \"good bacteria\" to help your intestines stay healthy. Studies have shown that probiotics " help prevent diarrhea and other intestine problems (including C. diff infection) when you take antibiotics. You can buy these without a prescription in the pharmacy section of the store.   If you were given a prescription for medicine here today, be sure to read all of the information (including the package insert) that comes with your prescription.  This will include important information about the medicine, its side effects, and any warnings that you need to know about.  The pharmacist who fills the prescription can provide more information and answer questions you may have about the medicine.  If you have questions or concerns that the pharmacist cannot address, please call or return to the Emergency Department.   Remember that you can always come back to the Emergency Department if you are not able to see your regular provider in the amount of time listed above, if you get any new symptoms, or if there is anything that worries you.     Consider a vitamin with magnesium to help with your slightly lower magnesium levels.

## 2025-07-17 NOTE — ED TRIAGE NOTES
Pt comes in with alcohol withdrawal.  She states that she has been drinking about 4-6 mixed drinks per day for the past 4 days.  She states that she does not drink every day.  She also states that she has a UTI, she was on antibiotics but has finished these and is still having symptoms.      Triage Assessment (Adult)       Row Name 07/17/25 0847          Triage Assessment    Airway WDL WDL        Respiratory WDL    Respiratory WDL WDL

## 2025-07-17 NOTE — ED PROVIDER NOTES
Emergency Department Note      History of Present Illness     Chief Complaint   Alcohol Problem      HPI   Jacquie Henderson is a 35 year old female with history of alcohol abuse and hypertension who presents to the ED for evaluation of an alcohol problem. The patient reports that she was in an alcohol detox program about 1.5 months ago. She states that the program helped her significantly. She was sober for over a month following the completion of her program. For the past 4 days, the patient has been having 5-7 alcoholic drinks per day. She states that her last drink was around 0340. She denies anything that triggered her to drink again. Patient endorses chest tightness, lower abdominal pain, nausea, and tremors. She reports that she had an episode of emesis this morning. She also notes that she has had urinary issues for the past week. Patient was diagnosed with a UTI and finished her course of Macrobid 3 days ago. She states that her urinary symptoms have not relieved at all yet. Patient endorses chance of pregnancy but states that it is unlikely. Denies history of withdrawal seizures. Denies fever or flank pain. No suicidal ideations or homicidal ideations. Denies anyone harming her.      Independent Historian   None    Review of External Notes   Reviewed psychiatric discharge summary from 6/2/25  Reviewed urine culture from [facility]. The organism was susceptible to macrobid     Past Medical History     Medical History and Problem List   Anxiety  Substance abuse  Alcohol abuse  Alcohol withdrawal  Grave's disease  Rheumatoid arthritis  Osteogenesis imperfecta  PTSD  Hypertension     Medications   Disulfiram  Gabapentin  Sertraline  Hydroxyzine    Surgical History   The patient has no pertinent past surgical history.     Physical Exam     Patient Vitals for the past 24 hrs:   BP Temp Temp src Pulse Resp SpO2 Height Weight   07/17/25 1301 -- -- -- 111 15 97 % -- --   07/17/25 1246 (!) 140/88 -- -- 109 13  "97 % -- --   07/17/25 1231 136/83 -- -- -- 18 98 % -- --   07/17/25 1216 130/82 -- -- 113 20 97 % -- --   07/17/25 1201 127/81 -- -- 120 26 99 % -- --   07/17/25 1145 120/85 -- -- 112 -- 97 % -- --   07/17/25 1135 -- -- -- 105 14 97 % -- --   07/17/25 1130 (!) 126/90 -- -- 113 25 98 % -- --   07/17/25 1125 (!) 140/85 -- -- 112 23 97 % -- --   07/17/25 1105 135/88 -- -- 114 18 98 % -- --   07/17/25 1102 135/88 -- -- 112 18 98 % -- --   07/17/25 1032 (!) 135/90 -- -- (!) 121 14 100 % -- --   07/17/25 1024 -- -- -- 109 17 97 % -- --   07/17/25 1015 (!) 147/101 -- -- 109 23 100 % -- --   07/17/25 1000 128/89 -- -- 112 19 98 % -- --   07/17/25 0849 (!) 159/116 98.2  F (36.8  C) Temporal (!) 130 18 100 % 1.727 m (5' 8\") 61.3 kg (135 lb 2.3 oz)     Physical Exam  ***    Diagnostics     Lab Results   Labs Ordered and Resulted from Time of ED Arrival to Time of ED Departure   COMPREHENSIVE METABOLIC PANEL - Abnormal       Result Value    Sodium 139      Potassium 3.8      Carbon Dioxide (CO2) 18 (*)     Anion Gap 29 (*)     Urea Nitrogen 13.3      Creatinine 0.73      GFR Estimate >90      Calcium 8.7 (*)     Chloride 92 (*)     Glucose 82      Alkaline Phosphatase 65      AST 55 (*)     ALT 30      Protein Total 7.9      Albumin 4.9      Bilirubin Total 0.9     ETHANOL LEVEL BLOOD - Abnormal    Ethanol Level Blood 0.21 (*)    MAGNESIUM - Abnormal    Magnesium 1.6 (*)    ROUTINE UA WITH MICROSCOPIC REFLEX TO CULTURE - Abnormal    Color Urine Orange (*)     Appearance Urine Cloudy (*)     Glucose Urine        Bilirubin Urine        Ketones Urine        Specific Gravity Urine        Blood Urine        pH Urine        Protein Albumin Urine        Urobilinogen Urine        Nitrite Urine        Leukocyte Esterase Urine        Mucus Urine Present (*)     RBC Urine 37 (*)     WBC Urine >182 (*)     Squamous Epithelials Urine 3 (*)     Transitional Epithelials Urine 2 (*)     Hyaline Casts Urine 8 (*)    CBC WITH PLATELETS AND " DIFFERENTIAL - Abnormal    WBC Count 9.9      RBC Count 4.46      Hemoglobin 12.7      Hematocrit 38.2      MCV 86      MCH 28.5      MCHC 33.2      RDW 14.6      Platelet Count 339      % Neutrophils 87      % Lymphocytes 8      % Monocytes 4      % Eosinophils 0      % Basophils 1      % Immature Granulocytes 0      NRBCs per 100 WBC 0      Absolute Neutrophils 8.6 (*)     Absolute Lymphocytes 0.8      Absolute Monocytes 0.4      Absolute Eosinophils 0.0      Absolute Basophils 0.1      Absolute Immature Granulocytes 0.0      Absolute NRBCs 0.0     ISTAT GASES LACTATE VENOUS POCT - Abnormal    Lactic Acid POCT 5.2 (*)     Bicarbonate Venous POCT 21      O2 Sat, Venous POCT 81 (*)     pCO2 Venous POCT 35 (*)     pH Venous POCT 7.39      pO2 Venous POCT 45     TROPONIN T, HIGH SENSITIVITY - Normal    Troponin T, High Sensitivity <6     HCG QUALITATIVE PREGNANCY - Normal    hCG Serum Qualitative Negative     LACTIC ACID WHOLE BLOOD   URINE CULTURE       Imaging   CT Abdomen Pelvis w Contrast   Final Result   IMPRESSION:    1.  Diffuse wall thickening of the urinary bladder raising the possibility of a cystitis. No hydronephrosis. No urolithiasis.   2.  Fatty liver.   3.  Complex cystic lesion is stable at the right ovary/adnexa measuring approximately 4.4 cm. A cystic lesion at the left ovary is new or larger since 8/9/2024. These are technically indeterminate. Endometriomas remains a possibility. Suggest further    characterization with nonurgent pelvic ultrasound or pelvic MRI.          EKG   ECG taken at 08:41, ECG read at 10:09  Sinus tachycardia  Possible left atrial enlargement   Rate 114 bpm. CA interval 122 ms. QRS duration 90 ms. QT/QTc 348/479 ms. P-R-T axes 65 48 41.    Independent Interpretation   None    ED Course      Medications Administered   Medications   sodium chloride 0.9% BOLUS 1,000 mL (0 mLs Intravenous Stopped 7/17/25 1107)   diazepam (VALIUM) injection 5 mg (5 mg Intravenous $Given 7/17/25  0928)   ondansetron (ZOFRAN) injection 4 mg (4 mg Intravenous $Given 7/17/25 0928)   alum & mag hydroxide-simethicone (MAALOX) suspension 30 mL (30 mLs Oral $Given 7/17/25 0928)   famotidine (PEPCID) tablet 20 mg (20 mg Oral $Given 7/17/25 0928)   diazepam (VALIUM) tablet 5 mg (5 mg Oral $Given 7/17/25 1045)   sulfamethoxazole-trimethoprim (BACTRIM DS) 800-160 MG per tablet 1 tablet (1 tablet Oral $Given 7/17/25 1045)   sodium chloride 0.9% BOLUS 1,000 mL (1,000 mLs Intravenous $New Bag 7/17/25 1147)   iopamidol (ISOVUE-370) solution 500 mL (68 mLs Intravenous $Given 7/17/25 1111)   sodium chloride 0.9 % bag for CT scan flush (57 mLs Intravenous $Given 7/17/25 1111)   diazepam (VALIUM) tablet 5 mg (5 mg Oral $Given 7/17/25 1145)   ondansetron (ZOFRAN) injection 4 mg (4 mg Intravenous $Given 7/17/25 1250)       Procedures   None     Discussion of Management   None    ED Course   ED Course as of 07/17/25 1331   Thu Jul 17, 2025   0856 I obtained history and examined the patient as noted above.        Additional Documentation  None    Medical Decision Making / Diagnosis     CMS Diagnoses: Lactic acid elevated due to ***. At this time, there are no signs of sepsis or septic shock    MIPS   None    [facility]   Jacquie Henderson is a 35 year old female ***    Disposition   The patient was discharged.     Diagnosis     ICD-10-CM    1. Alcohol withdrawal syndrome without complication (H)  F10.930       2. Recurrent UTI  N39.0            Discharge Medications   New Prescriptions    No medications on file     Scribe Disclosure:  I, Dakota Yost, am serving as a scribe at 8:53 AM on 7/17/2025 to document services personally performed by Roslyn Feliciano MD based on my observations and the provider's statements to me.        ED Course as of 07/17/25 1331   Thu Jul 17, 2025   0843 I obtained history and examined the patient as noted above.    I reassessed the patient.  She notes she was feeling more comfortable.  Her heart rate and blood pressure are improving.  She denies any new concerns.  I reassessed the patient.  I discussed findings of today's evaluation.  I discussed plan which she is agreeable to.    Additional Documentation  None    Medical Decision Making / Diagnosis     CMS Diagnoses: Lactic acid elevated due to hypovolemia in the setting of chronic alcohol abuse. At this time, there are no signs of sepsis or septic shock    MIPS   None    [facility]   Jacquie Henderson is a 35 year old female with a recent UTI in the setting of recurrent UTIs who presents emergency department with concerns for alcohol withdrawal and a desire for detox as well as ongoing urinary symptoms.  She did finish a full course of Macrobid which she has been on before.  She has mild tenderness in the suprapubic region but no clinical evidence including fever or CVA tenderness/flank pain for pyelonephritis.  She has no leukocytosis.  She is acutely withdrawing from alcohol and I suspect hypovolemia in the setting is the source of the lactic acidosis.  This improved over time with IV fluids.  She is taking p.o., had improved hemodynamics, and had downtrending lactate.  Her symptoms have improved with Valium administration and she requires detox which she was agreeable to and was set up for her for later this afternoon.  She will be driven there by a family member.  With regards to the recurrent UTI, no evidence of pyelonephritis or kidney abscess.  CT not demonstrating any worrisome pathology.  Likely cystitis will be treated with Bactrim, initial antibiotic given here.  Patient will fill up the remaining antibiotics and take them with her to detox.  She is recommend drink plenty fluids.  Return precautions discussed.  All questions answered prior to  discharge.    Disposition   The patient was discharged.     Diagnosis     ICD-10-CM    1. Alcohol withdrawal syndrome without complication (H)  F10.930       2. Recurrent UTI  N39.0            Discharge Medications   Discharge Medication List as of 7/17/2025  2:02 PM        START taking these medications    Details   sulfamethoxazole-trimethoprim (BACTRIM DS) 800-160 MG tablet Take 1 tablet by mouth 2 times daily for 5 days., Disp-10 tablet, R-0, E-Prescribe           Scribe Disclosure:  IDakota, am serving as a scribe at 8:53 AM on 7/17/2025 to document services personally performed by Roslyn Feliciano MD based on my observations and the provider's statements to me.        Roslyn Feliciano MD  07/18/25 [id number]

## 2025-07-18 LAB — BACTERIA UR CULT: ABNORMAL

## 2025-07-19 ENCOUNTER — TELEPHONE (OUTPATIENT)
Dept: NURSING | Facility: CLINIC | Age: 35
End: 2025-07-19
Payer: COMMERCIAL

## 2025-07-19 DIAGNOSIS — N39.0 URINARY TRACT INFECTION: Primary | ICD-10-CM

## 2025-07-19 RX ORDER — SULFAMETHOXAZOLE AND TRIMETHOPRIM 800; 160 MG/1; MG/1
1 TABLET ORAL 2 TIMES DAILY
Qty: 10 TABLET | Refills: 0 | Status: SHIPPED | OUTPATIENT
Start: 2025-07-19

## 2025-07-19 NOTE — TELEPHONE ENCOUNTER
[facility]    Reason for call: Lab Result Notification     Lab Result (including Rx patient on, if applicable).  If culture, copy of lab report at bottom.  Lab Result: Urine culture - see below    ED Rx: sulfamethoxazole-trimethoprim (BACTRIM DS) 800-160 MG tablet - Take 1 tablet by mouth 2 times daily for 5 days.   10,000-50,000 CFU/mL Escherichia coli Abnormal  (Susceptible)    Creatinine Level (mg/dl)   Creatinine   Date Value Ref Range Status   07/17/2025 0.73 0.51 - 0.95 mg/dL Final    Creatinine clearance (ml/min), if applicable    Serum creatinine: 0.73 mg/dL 07/17/25 0915  Estimated creatinine clearance: 105.1 mL/min     ED Symptoms:  Patient presented to [facility] ED on 07/17/25 for evaluation of an alcohol problem as well as ongoing urinary symptoms. Recently treated for UTI and finished Macrobid 3 days ago.    RN Recommendations/Instructions per [place] ED lab result protocol:   [facility] ED lab result protocol utilized: Urine Culture  Continue antibiotic/medication as prescribed: Bactrim, pending patient assessment      Unable to reach patient/caregiver.     Left voicemail message requesting a call back to [phone] between 9 a.m. and 5:30 p.m. for patient's ED/UC lab results.      Letter pended to be sent via [employer] mail.  - Removed    ADDENDUM:  7/19/25 @ 11:22AM -Patient returned call. Relayed results below per urine culture protocol.     Symptoms: No change in symptoms. Has not been able to pick prescription up. Requested prescription be transferred to [facility] in [facility].     Advised patient take full course of Bactrim DS as prescribed. Care advice given and return precautions reviewed. Patient is agreeable with plan and verbalized understanding.    Resent prescription to [facility] per patient request.    REJI Box, REJI

## 2025-07-19 NOTE — LETTER
July 19, 2025        Jacquie Henderson  [id number] [id number]          Dear Jacquie Henderson:    You were seen in the [facility] Emergency Department at [facility] on 7/17/2025.  We are unable to reach you by phone, so we are sending you this letter.     It is important that you call [facility] Emergency Department lab result nurse at [phone], as we have information to relay to you AND/OR we MAY have to make some changes in your treatment.    Best time to call back is between 9AM and 5:30PM, 7 days a week.      Sincerely,     [facility] Emergency Department Lab Result RN  [phone]

## 2025-07-28 ENCOUNTER — APPOINTMENT (OUTPATIENT)
Dept: GENERAL RADIOLOGY | Facility: CLINIC | Age: 35
End: 2025-07-28
Attending: EMERGENCY MEDICINE
Payer: COMMERCIAL

## 2025-07-28 ENCOUNTER — HOSPITAL ENCOUNTER (INPATIENT)
Age: 35
End: 2025-07-28
Attending: PSYCHIATRY & NEUROLOGY
Payer: COMMERCIAL

## 2025-07-28 ENCOUNTER — TELEPHONE (OUTPATIENT)
Dept: BEHAVIORAL HEALTH | Facility: CLINIC | Age: 35
End: 2025-07-28

## 2025-07-28 ENCOUNTER — HOSPITAL ENCOUNTER (EMERGENCY)
Facility: CLINIC | Age: 35
Discharge: HOME | End: 2025-07-28
Attending: EMERGENCY MEDICINE | Admitting: EMERGENCY MEDICINE
Payer: COMMERCIAL

## 2025-07-28 VITALS
RESPIRATION RATE: 18 BRPM | WEIGHT: 134.92 LBS | TEMPERATURE: 98.6 F | DIASTOLIC BLOOD PRESSURE: 101 MMHG | SYSTOLIC BLOOD PRESSURE: 135 MMHG | BODY MASS INDEX: 20.45 KG/M2 | HEART RATE: 103 BPM | HEIGHT: 68 IN | OXYGEN SATURATION: 98 %

## 2025-07-28 DIAGNOSIS — F10.230 ALCOHOL DEPENDENCE WITH UNCOMPLICATED WITHDRAWAL (H): ICD-10-CM

## 2025-07-28 DIAGNOSIS — E83.42 HYPOMAGNESEMIA: ICD-10-CM

## 2025-07-28 DIAGNOSIS — E88.89 ALCOHOLIC KETOSIS (H): ICD-10-CM

## 2025-07-28 DIAGNOSIS — R45.851 SUICIDAL IDEATION: Primary | ICD-10-CM

## 2025-07-28 DIAGNOSIS — R07.89 ATYPICAL CHEST PAIN: ICD-10-CM

## 2025-07-28 PROBLEM — F43.10 PTSD (POST-TRAUMATIC STRESS DISORDER): Status: ACTIVE | Noted: 2025-07-28

## 2025-07-28 LAB
ALBUMIN SERPL BCG-MCNC: 4.5 G/DL (ref 3.5–5.2)
ALBUMIN UR-MCNC: 20 MG/DL
ALP SERPL-CCNC: 66 U/L (ref 40–150)
ALT SERPL W P-5'-P-CCNC: 42 U/L (ref 0–50)
AMPHETAMINES UR QL SCN: ABNORMAL
ANION GAP SERPL CALCULATED.3IONS-SCNC: 24 MMOL/L (ref 7–15)
APPEARANCE UR: CLEAR
AST SERPL W P-5'-P-CCNC: 77 U/L (ref 0–45)
ATRIAL RATE - MUSE: 93 BPM
B-OH-BUTYR SERPL-SCNC: 0.96 MMOL/L
BARBITURATES UR QL SCN: ABNORMAL
BASE EXCESS BLDV CALC-SCNC: 1.4 MMOL/L (ref -3–3)
BASOPHILS # BLD AUTO: 0 10E3/UL (ref 0–0.2)
BASOPHILS NFR BLD AUTO: 0 %
BENZODIAZ UR QL SCN: ABNORMAL
BILIRUB SERPL-MCNC: 0.7 MG/DL
BILIRUB UR QL STRIP: NEGATIVE
BUN SERPL-MCNC: 7 MG/DL (ref 6–20)
BZE UR QL SCN: ABNORMAL
CALCIUM SERPL-MCNC: 8.9 MG/DL (ref 8.8–10.4)
CANNABINOIDS UR QL SCN: ABNORMAL
CHLORIDE SERPL-SCNC: 92 MMOL/L (ref 98–107)
COLOR UR AUTO: ABNORMAL
CREAT SERPL-MCNC: 0.67 MG/DL (ref 0.51–0.95)
DIASTOLIC BLOOD PRESSURE - MUSE: NORMAL MMHG
EGFRCR SERPLBLD CKD-EPI 2021: >90 ML/MIN/1.73M2
EOSINOPHIL # BLD AUTO: 0 10E3/UL (ref 0–0.7)
EOSINOPHIL NFR BLD AUTO: 0 %
ERYTHROCYTE [DISTWIDTH] IN BLOOD BY AUTOMATED COUNT: 15.4 % (ref 10–15)
ETHANOL SERPL-MCNC: 0.22 G/DL
FENTANYL UR QL: ABNORMAL
FLUAV RNA SPEC QL NAA+PROBE: NEGATIVE
FLUBV RNA RESP QL NAA+PROBE: NEGATIVE
GLUCOSE SERPL-MCNC: 89 MG/DL (ref 70–99)
GLUCOSE UR STRIP-MCNC: NEGATIVE MG/DL
HCG SERPL QL: NEGATIVE
HCO3 BLDV-SCNC: 26 MMOL/L (ref 21–28)
HCO3 SERPL-SCNC: 23 MMOL/L (ref 22–29)
HCT VFR BLD AUTO: 34.4 % (ref 35–47)
HGB BLD-MCNC: 11.9 G/DL (ref 11.7–15.7)
HGB UR QL STRIP: NEGATIVE
HYALINE CASTS: 1 /LPF
IMM GRANULOCYTES # BLD: 0 10E3/UL
IMM GRANULOCYTES NFR BLD: 0 %
INTERPRETATION ECG - MUSE: NORMAL
KETONES UR STRIP-MCNC: NEGATIVE MG/DL
LEUKOCYTE ESTERASE UR QL STRIP: NEGATIVE
LIPASE SERPL-CCNC: 24 U/L (ref 13–60)
LYMPHOCYTES # BLD AUTO: 1.2 10E3/UL (ref 0.8–5.3)
LYMPHOCYTES NFR BLD AUTO: 24 %
MAGNESIUM SERPL-MCNC: 1.6 MG/DL (ref 1.7–2.3)
MCH RBC QN AUTO: 28.9 PG (ref 26.5–33)
MCHC RBC AUTO-ENTMCNC: 34.6 G/DL (ref 31.5–36.5)
MCV RBC AUTO: 84 FL (ref 78–100)
MONOCYTES # BLD AUTO: 0.6 10E3/UL (ref 0–1.3)
MONOCYTES NFR BLD AUTO: 12 %
MUCOUS THREADS #/AREA URNS LPF: PRESENT /LPF
NEUTROPHILS # BLD AUTO: 3.1 10E3/UL (ref 1.6–8.3)
NEUTROPHILS NFR BLD AUTO: 63 %
NITRATE UR QL: NEGATIVE
NRBC # BLD AUTO: 0 10E3/UL
NRBC BLD AUTO-RTO: 0 /100
O2/TOTAL GAS SETTING VFR VENT: 21 %
OPIATES UR QL SCN: ABNORMAL
OXYHGB MFR BLDV: 76 % (ref 70–75)
P AXIS - MUSE: 46 DEGREES
PCO2 BLDV: 41 MM HG (ref 40–50)
PCP QUAL URINE (ROCHE): ABNORMAL
PH BLDV: 7.42 [PH] (ref 7.32–7.43)
PH UR STRIP: 5.5 [PH] (ref 5–7)
PLATELET # BLD AUTO: 157 10E3/UL (ref 150–450)
PO2 BLDV: 46 MM HG (ref 25–47)
POTASSIUM SERPL-SCNC: 3.6 MMOL/L (ref 3.4–5.3)
PR INTERVAL - MUSE: 124 MS
PROT SERPL-MCNC: 7.6 G/DL (ref 6.4–8.3)
QRS DURATION - MUSE: 94 MS
QT - MUSE: 384 MS
QTC - MUSE: 477 MS
R AXIS - MUSE: 43 DEGREES
RBC # BLD AUTO: 4.12 10E6/UL (ref 3.8–5.2)
RBC URINE: <1 /HPF
RSV RNA SPEC NAA+PROBE: NEGATIVE
S PYO DNA THROAT QL NAA+PROBE: NOT DETECTED
SAO2 % BLDV: 77.7 % (ref 70–75)
SARS-COV-2 RNA RESP QL NAA+PROBE: NEGATIVE
SODIUM SERPL-SCNC: 139 MMOL/L (ref 135–145)
SP GR UR STRIP: 1.01 (ref 1–1.03)
SQUAMOUS EPITHELIAL: <1 /HPF
SYSTOLIC BLOOD PRESSURE - MUSE: NORMAL MMHG
T AXIS - MUSE: 35 DEGREES
TROPONIN T SERPL HS-MCNC: <6 NG/L
UROBILINOGEN UR STRIP-MCNC: NORMAL MG/DL
VENTRICULAR RATE- MUSE: 93 BPM
WBC # BLD AUTO: 5 10E3/UL (ref 4–11)
WBC URINE: 1 /HPF

## 2025-07-28 PROCEDURE — 82010 KETONE BODYS QUAN: CPT | Performed by: EMERGENCY MEDICINE

## 2025-07-28 PROCEDURE — 258N000003 HC RX IP 258 OP 636: Performed by: EMERGENCY MEDICINE

## 2025-07-28 PROCEDURE — 250N000011 HC RX IP 250 OP 636: Performed by: EMERGENCY MEDICINE

## 2025-07-28 PROCEDURE — 87637 SARSCOV2&INF A&B&RSV AMP PRB: CPT | Performed by: EMERGENCY MEDICINE

## 2025-07-28 PROCEDURE — 85004 AUTOMATED DIFF WBC COUNT: CPT | Performed by: EMERGENCY MEDICINE

## 2025-07-28 PROCEDURE — 84484 ASSAY OF TROPONIN QUANT: CPT | Performed by: EMERGENCY MEDICINE

## 2025-07-28 PROCEDURE — 81001 URINALYSIS AUTO W/SCOPE: CPT | Performed by: EMERGENCY MEDICINE

## 2025-07-28 PROCEDURE — 71045 X-RAY EXAM CHEST 1 VIEW: CPT

## 2025-07-28 PROCEDURE — 36415 COLL VENOUS BLD VENIPUNCTURE: CPT | Performed by: EMERGENCY MEDICINE

## 2025-07-28 PROCEDURE — 87651 STREP A DNA AMP PROBE: CPT | Performed by: EMERGENCY MEDICINE

## 2025-07-28 PROCEDURE — 82077 ASSAY SPEC XCP UR&BREATH IA: CPT | Performed by: EMERGENCY MEDICINE

## 2025-07-28 PROCEDURE — 82374 ASSAY BLOOD CARBON DIOXIDE: CPT | Performed by: EMERGENCY MEDICINE

## 2025-07-28 PROCEDURE — 96375 TX/PRO/DX INJ NEW DRUG ADDON: CPT

## 2025-07-28 PROCEDURE — 80307 DRUG TEST PRSMV CHEM ANLYZR: CPT | Performed by: EMERGENCY MEDICINE

## 2025-07-28 PROCEDURE — 96376 TX/PRO/DX INJ SAME DRUG ADON: CPT

## 2025-07-28 PROCEDURE — 82805 BLOOD GASES W/O2 SATURATION: CPT | Performed by: EMERGENCY MEDICINE

## 2025-07-28 PROCEDURE — 84703 CHORIONIC GONADOTROPIN ASSAY: CPT | Performed by: EMERGENCY MEDICINE

## 2025-07-28 PROCEDURE — 99207 PR NO CHARGE LOS: CPT | Performed by: NURSE PRACTITIONER

## 2025-07-28 PROCEDURE — 96365 THER/PROPH/DIAG IV INF INIT: CPT

## 2025-07-28 PROCEDURE — 96361 HYDRATE IV INFUSION ADD-ON: CPT

## 2025-07-28 PROCEDURE — 93005 ELECTROCARDIOGRAM TRACING: CPT

## 2025-07-28 PROCEDURE — 83735 ASSAY OF MAGNESIUM: CPT | Performed by: EMERGENCY MEDICINE

## 2025-07-28 PROCEDURE — 99285 EMERGENCY DEPT VISIT HI MDM: CPT | Mod: 25 | Performed by: EMERGENCY MEDICINE

## 2025-07-28 PROCEDURE — 83690 ASSAY OF LIPASE: CPT | Performed by: EMERGENCY MEDICINE

## 2025-07-28 PROCEDURE — 250N000013 HC RX MED GY IP 250 OP 250 PS 637: Performed by: EMERGENCY MEDICINE

## 2025-07-28 PROCEDURE — 96366 THER/PROPH/DIAG IV INF ADDON: CPT

## 2025-07-28 RX ORDER — MAGNESIUM SULFATE HEPTAHYDRATE 40 MG/ML
2 INJECTION, SOLUTION INTRAVENOUS ONCE
Status: COMPLETED | OUTPATIENT
Start: 2025-07-28 | End: 2025-07-28

## 2025-07-28 RX ORDER — ACETAMINOPHEN 325 MG/1
650 TABLET ORAL EVERY 4 HOURS PRN
Status: CANCELLED | OUTPATIENT
Start: 2025-07-28

## 2025-07-28 RX ORDER — MAGNESIUM HYDROXIDE/ALUMINUM HYDROXICE/SIMETHICONE 120; 1200; 1200 MG/30ML; MG/30ML; MG/30ML
30 SUSPENSION ORAL EVERY 4 HOURS PRN
Status: CANCELLED | OUTPATIENT
Start: 2025-07-28

## 2025-07-28 RX ORDER — ONDANSETRON 4 MG/1
4 TABLET, ORALLY DISINTEGRATING ORAL EVERY 6 HOURS PRN
Status: CANCELLED | OUTPATIENT
Start: 2025-07-28

## 2025-07-28 RX ORDER — LOPERAMIDE HYDROCHLORIDE 2 MG/1
2 CAPSULE ORAL 4 TIMES DAILY PRN
Status: CANCELLED | OUTPATIENT
Start: 2025-07-28

## 2025-07-28 RX ORDER — AMOXICILLIN 250 MG
1 CAPSULE ORAL 2 TIMES DAILY PRN
Status: CANCELLED | OUTPATIENT
Start: 2025-07-28

## 2025-07-28 RX ORDER — ONDANSETRON 2 MG/ML
4 INJECTION INTRAMUSCULAR; INTRAVENOUS ONCE
Status: COMPLETED | OUTPATIENT
Start: 2025-07-28 | End: 2025-07-28

## 2025-07-28 RX ORDER — MULTIPLE VITAMINS W/ MINERALS TAB 9MG-400MCG
1 TAB ORAL DAILY
Status: CANCELLED | OUTPATIENT
Start: 2025-07-28

## 2025-07-28 RX ORDER — ATENOLOL 50 MG/1
50 TABLET ORAL DAILY PRN
Status: CANCELLED | OUTPATIENT
Start: 2025-07-28

## 2025-07-28 RX ORDER — HYDROXYZINE HYDROCHLORIDE 25 MG/1
25 TABLET, FILM COATED ORAL EVERY 4 HOURS PRN
Status: CANCELLED | OUTPATIENT
Start: 2025-07-28

## 2025-07-28 RX ORDER — HALOPERIDOL 5 MG/ML
2.5-5 INJECTION INTRAMUSCULAR EVERY 6 HOURS PRN
Status: DISCONTINUED | OUTPATIENT
Start: 2025-07-28 | End: 2025-07-28 | Stop reason: HOSPADM

## 2025-07-28 RX ORDER — MULTIPLE VITAMINS W/ MINERALS TAB 9MG-400MCG
1 TAB ORAL DAILY
Status: DISCONTINUED | OUTPATIENT
Start: 2025-07-28 | End: 2025-07-28 | Stop reason: HOSPADM

## 2025-07-28 RX ORDER — DIAZEPAM 10 MG/1
10 TABLET ORAL EVERY 30 MIN PRN
Status: CANCELLED | OUTPATIENT
Start: 2025-07-28

## 2025-07-28 RX ORDER — CLONIDINE HYDROCHLORIDE 0.1 MG/1
0.1 TABLET ORAL EVERY 8 HOURS
Status: DISCONTINUED | OUTPATIENT
Start: 2025-07-28 | End: 2025-07-28 | Stop reason: HOSPADM

## 2025-07-28 RX ORDER — FLUMAZENIL 0.1 MG/ML
0.2 INJECTION, SOLUTION INTRAVENOUS
Status: DISCONTINUED | OUTPATIENT
Start: 2025-07-28 | End: 2025-07-28 | Stop reason: HOSPADM

## 2025-07-28 RX ORDER — OLANZAPINE 5 MG/1
5-10 TABLET, ORALLY DISINTEGRATING ORAL EVERY 6 HOURS PRN
Status: DISCONTINUED | OUTPATIENT
Start: 2025-07-28 | End: 2025-07-28 | Stop reason: HOSPADM

## 2025-07-28 RX ORDER — FOLIC ACID 1 MG/1
1 TABLET ORAL DAILY
Status: CANCELLED | OUTPATIENT
Start: 2025-07-28

## 2025-07-28 RX ORDER — FOLIC ACID 1 MG/1
1 TABLET ORAL ONCE
Status: COMPLETED | OUTPATIENT
Start: 2025-07-28 | End: 2025-07-28

## 2025-07-28 RX ORDER — TRAZODONE HYDROCHLORIDE 50 MG/1
50 TABLET ORAL
Status: CANCELLED | OUTPATIENT
Start: 2025-07-28

## 2025-07-28 RX ORDER — DIAZEPAM 10 MG/2ML
5-10 INJECTION, SOLUTION INTRAMUSCULAR; INTRAVENOUS EVERY 30 MIN PRN
Status: DISCONTINUED | OUTPATIENT
Start: 2025-07-28 | End: 2025-07-28 | Stop reason: HOSPADM

## 2025-07-28 RX ORDER — DIAZEPAM 5 MG/1
10 TABLET ORAL EVERY 30 MIN PRN
Status: DISCONTINUED | OUTPATIENT
Start: 2025-07-28 | End: 2025-07-28 | Stop reason: HOSPADM

## 2025-07-28 RX ORDER — DIAZEPAM 5 MG/1
5 TABLET ORAL EVERY 12 HOURS PRN
Qty: 2 TABLET | Refills: 0 | Status: SHIPPED | OUTPATIENT
Start: 2025-07-28

## 2025-07-28 RX ADMIN — DIAZEPAM 5 MG: 10 INJECTION, SOLUTION INTRAMUSCULAR; INTRAVENOUS at 12:53

## 2025-07-28 RX ADMIN — ONDANSETRON 4 MG: 2 INJECTION, SOLUTION INTRAMUSCULAR; INTRAVENOUS at 10:28

## 2025-07-28 RX ADMIN — FAMOTIDINE 20 MG: 10 INJECTION, SOLUTION INTRAVENOUS at 05:54

## 2025-07-28 RX ADMIN — ONDANSETRON 4 MG: 2 INJECTION, SOLUTION INTRAMUSCULAR; INTRAVENOUS at 05:54

## 2025-07-28 RX ADMIN — DIAZEPAM 10 MG: 5 TABLET ORAL at 08:14

## 2025-07-28 RX ADMIN — FOLIC ACID 1 MG: 1 TABLET ORAL at 05:55

## 2025-07-28 RX ADMIN — SODIUM CHLORIDE 500 ML: 0.9 INJECTION, SOLUTION INTRAVENOUS at 08:02

## 2025-07-28 RX ADMIN — DIAZEPAM 5 MG: 10 INJECTION, SOLUTION INTRAMUSCULAR; INTRAVENOUS at 06:50

## 2025-07-28 RX ADMIN — DIAZEPAM 10 MG: 10 INJECTION, SOLUTION INTRAMUSCULAR; INTRAVENOUS at 05:54

## 2025-07-28 RX ADMIN — Medication 1 TABLET: at 10:25

## 2025-07-28 RX ADMIN — DIAZEPAM 5 MG: 10 INJECTION, SOLUTION INTRAMUSCULAR; INTRAVENOUS at 10:08

## 2025-07-28 RX ADMIN — THIAMINE HCL TAB 100 MG 100 MG: 100 TAB at 05:55

## 2025-07-28 RX ADMIN — CLONIDINE HYDROCHLORIDE 0.1 MG: 0.1 TABLET ORAL at 05:55

## 2025-07-28 RX ADMIN — MAGNESIUM SULFATE HEPTAHYDRATE 2 G: 40 INJECTION, SOLUTION INTRAVENOUS at 06:27

## 2025-07-28 RX ADMIN — SODIUM CHLORIDE 1000 ML: 0.9 INJECTION, SOLUTION INTRAVENOUS at 05:49

## 2025-07-28 ASSESSMENT — LIFESTYLE VARIABLES
TREMOR: MODERATE, WITH PATIENT'S ARMS EXTENDED
TOTAL SCORE: 12
ORIENTATION AND CLOUDING OF SENSORIUM: ORIENTED AND CAN DO SERIAL ADDITIONS
ORIENTATION AND CLOUDING OF SENSORIUM: ORIENTED AND CAN DO SERIAL ADDITIONS
HEADACHE, FULLNESS IN HEAD: MODERATE
AUDITORY DISTURBANCES: NOT PRESENT
HEADACHE, FULLNESS IN HEAD: MODERATE
AUDITORY DISTURBANCES: NOT PRESENT
TACTILE DISTURBANCES: VERY MILD ITCHING, PINS AND NEEDLES, BURNING OR NUMBNESS
HEADACHE, FULLNESS IN HEAD: VERY MILD
AGITATION: NORMAL ACTIVITY
TREMOR: MODERATE, WITH PATIENT'S ARMS EXTENDED
TREMOR: 5
TACTILE DISTURBANCES: VERY MILD ITCHING, PINS AND NEEDLES, BURNING OR NUMBNESS
ANXIETY: NO ANXIETY, AT EASE
PAROXYSMAL SWEATS: NO SWEAT VISIBLE
TACTILE DISTURBANCES: MILD ITCHING, PINS AND NEEDLES, BURNING OR NUMBNESS
NAUSEA AND VOMITING: 2
ORIENTATION AND CLOUDING OF SENSORIUM: ORIENTED AND CAN DO SERIAL ADDITIONS
PAROXYSMAL SWEATS: NO SWEAT VISIBLE
VISUAL DISTURBANCES: VERY MILD SENSITIVITY
ORIENTATION AND CLOUDING OF SENSORIUM: ORIENTED AND CAN DO SERIAL ADDITIONS
ANXIETY: 2
VISUAL DISTURBANCES: MODERATE SENSITIVITY
ANXIETY: MILDLY ANXIOUS
AGITATION: NORMAL ACTIVITY
PAROXYSMAL SWEATS: NO SWEAT VISIBLE
VISUAL DISTURBANCES: VERY MILD SENSITIVITY
TOTAL SCORE: 11
TOTAL SCORE: 6
ANXIETY: 2
NAUSEA AND VOMITING: 2
AUDITORY DISTURBANCES: NOT PRESENT
PAROXYSMAL SWEATS: NO SWEAT VISIBLE
NAUSEA AND VOMITING: MILD NAUSEA WITH NO VOMITING
TREMOR: 3
AGITATION: NORMAL ACTIVITY
TREMOR: MODERATE, WITH PATIENT'S ARMS EXTENDED
VISUAL DISTURBANCES: NOT PRESENT
VISUAL DISTURBANCES: MILD SENSITIVITY
TREMOR: SEVERE, EVEN WITH ARMS NOT EXTENDED
NAUSEA AND VOMITING: MILD NAUSEA WITH NO VOMITING
TOTAL SCORE: 12
AUDITORY DISTURBANCES: NOT PRESENT
PAROXYSMAL SWEATS: NO SWEAT VISIBLE
AGITATION: NORMAL ACTIVITY
VISUAL DISTURBANCES: MILD SENSITIVITY
ORIENTATION AND CLOUDING OF SENSORIUM: ORIENTED AND CAN DO SERIAL ADDITIONS
TOTAL SCORE: 16
NAUSEA AND VOMITING: 3
ORIENTATION AND CLOUDING OF SENSORIUM: ORIENTED AND CAN DO SERIAL ADDITIONS
PAROXYSMAL SWEATS: NO SWEAT VISIBLE
NAUSEA AND VOMITING: NO NAUSEA AND NO VOMITING
AGITATION: NORMAL ACTIVITY
AGITATION: NORMAL ACTIVITY
ANXIETY: 2
AUDITORY DISTURBANCES: NOT PRESENT
ANXIETY: 2
TOTAL SCORE: 15
HEADACHE, FULLNESS IN HEAD: MODERATE
HEADACHE, FULLNESS IN HEAD: MILD
HEADACHE, FULLNESS IN HEAD: MILD
AUDITORY DISTURBANCES: NOT PRESENT

## 2025-07-28 ASSESSMENT — ACTIVITIES OF DAILY LIVING (ADL)
ADLS_ACUITY_SCORE: 56

## 2025-07-28 NOTE — TELEPHONE ENCOUNTER
R: ACCEPTED TO 3A/CD/[facility]I .. PT DECLINED ADMISSION TO DETOX    11:32a Paged Psychiatry Detox Reviewer Nevin, awaiting response.     Jeanne Rooney 11:44 AM    MRN [id number] accepted to 3A/detox for Dr Sandoval     Intake to update work lists.     11:49a Indicia Completed.     11:52a Called Unit [facility] CRN, CRN unavailable and will CB  Intake.     Vanna Sandoval 11:57 AM    this patient has a DEC assessment ordered? Can we wait until this is completed to determine MICD status?                 12:55p Called Unit 3A HAYLIE Bishop to inform pt is in queue for the unit. CRN informed that they will call for report shortly for when they can admit pt to unit.     12:56p Called GEORGE Flores to inform pt is in queue for unit 3A/[facility]. 3A CRN informed that they will call for report shortly for when they can admit pt to unit.     12:57p  Intake notes all work lists updated with pt's acceptance.    2:07 PM  Intake received call from Tk Flores that Pt is refusing detox and can be removed from list.  Coordinator following notified.     2:15p Called Unit [facility] HAYLIE Bishop to inform pt declining admission to 3A.    2:16p Pt removed from queue and admission cancelled.

## 2025-07-28 NOTE — TELEPHONE ENCOUNTER
R; 2:07 PM  Intake received call from Tk Flores that Pt is refusing detox and can be removed from list.  Coordinator following notified.

## 2025-07-28 NOTE — PLAN OF CARE
Jacquie Henderson  July 28, 2025  Plan of Care Hand-off Note     Patient Recommended Care Path: discharge    Clinical Substantiation:  After therapeutic assessment, intervention, and aftercare planning by ED care team and LM and in consultation with attending provider, the patient's circumstances and mental state were appropriate for outpatient management. It is the recommendation of this clinician that pt discharge with OP MH support. Currently the pt is not presenting as an acute risk to self or others due to the following factors: Pt denied any thoughts, intent, plans to harm herself or others. Pt is calm and cooperative for the assessment. She appears to have a desire to quit drinking and is seeking a detox admission currently to help manage her withdrawal symptoms. Pt declined any additional mental health services offered today during the assessment.    Goals for crisis stabilization:  Pt is requesting detox to help with her current withdrawal from alcohol.    Next steps for Care Team:  Pt is waiting to enter detox. Please print safety plan for pt prior to discharge.    Treatment Objectives Addressed:  rapport building, assessing safety, safety planning    Therapeutic Interventions:  Engaged in safety planning, Reviewed healthy living that supports positive mental health, including looking at sleep hygiene, regular movement, nutrition, and regular socialization., Worked on relapse prevention planning (review of stressors, early warning signs, written plan to respond to signs, and rehearse plan)., Identified and practiced coping skills.    Has a specific means been identified for suicidal.homicide actions: No    Patient coping skills attempted to reduce the crisis:  Exercise, walks, TV, music.    Collateral contact information:       Legal Status: Voluntary/Patient has signed consent for treatment                                                                       Reviewed court records: yes     Psychiatry  Consult: Not at this time. Pt declines states she has a med provider she works with.     JULISSA CopeSW

## 2025-07-28 NOTE — CONSULTS
Diagnostic Evaluation Consultation  Crisis Assessment    Patient Name: Jacquie Henderson  Age:  35 year old  Legal Sex: female  Gender Identity: female  Pronouns:  she / her    Race: White  Ethnicity: Not  or   Language: English    Patient was assessed: Virtual: [employer]   Crisis Assessment Start Date: 07/28/25  Crisis Assessment Start Time: 1026  Crisis Assessment Stop Time: 1059  Patient location: [facility] Emergency Dept  ED08    Referral Data and Chief Complaint  Jacquie Henderson presents to the ED with family/friends. Patient is presenting to the ED for the following concerns: Intoxication, Substance use, Suicidal ideation. Factors that make the mental health crisis life threatening or complex are: Pt arrives to ED via personal vehicle by a family member for alcohol intoxication and withdrawal. EOTH = 0.22 at 5:28AM. Pt states she is experiencing withdrawal, is shaking, sweaty, and has nausea. Pt reports 2 previous detox admissions, most recently in April or May in [facility] for 4-5 days. Pt reports her friend attempted suicide 5-6 years ago in front of her and she had a dream about this the other night, which causes her PTSD. Pt states this friend attempted suicide again (that she did not witness) last month and pt brought her to the ED to get help. Pt found her mother 7 years ago who passed away from an overdose, which she also reports PTSD triggers. Pt started individual therapy 2 years ago, through [facility] and through [facility]. Pt finds benefit from the therapy, however, reports it is very expensive. Pt denied any thoughts, intent, and plans to harm herself or others currently. Pt endorsed self-harm via cutting, most recently when she was in High School around the time of her parent s divorce, nothing since then. Pt does not feel she needs additional mental health services and is solely seeking detox admission today. Pt is waiting to hear back  from [place] detox or [facility] detox.    Informed Consent and Assessment Methods  Explained the crisis assessment process, including applicable information disclosures and limits to confidentiality, assessed understanding of the process, and obtained consent to proceed with the assessment.  Assessment methods included conducting a formal interview with patient, review of medical records, collaboration with medical staff, and obtaining relevant collateral information from family and community providers when available.  : done     History of the Crisis   Pt reports a hx of depression, anxiety, PTSD, and alcohol use disorder. Pt reports attending individual therapy, has been for two years. Pt is on Zoloft 25mgs, only took half of her prescribed dose. Pt states 50mgs made her not feel good, which her provider is aware and switched to a prescribed 25 mgs. Pt denied attending any previous IP MH placements, day treatment, PHP, [facility] treatment. Pt has attended detox twice and is attending individual therapy currently one time monthly.    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.)  Source of Income:  salary/wages  Financial Environmental Concerns:  none  Current Hobbies:  exercise/fitness, cooking/baking, outdoor activities, television/movies/videos, music, interaction with pets, reading (One dog, walks, exercise, cook.)  Barriers in Personal Life:  lack of companionship    Significant Clinical History  Current Anxiety Symptoms:  anxious, excessive worry, racing thoughts  Current Depression/Trauma:  sadness, excessive guilt, withdrawl/isolation, crying or feels like crying, low self esteem  Current Somatic Symptoms:  anxious, excessive worry, racing thoughts, somatic symptoms (abdominal pain, headache, tension), sweating, flushing,  "shaking  Current Psychosis/Thought Disturbance:   (None endorsed)  Current Eating Symptoms:  loss of appetite  Chemical Use History:  Alcohol: Binge  Last Use: 07/28/25 (5:00 am last drink.)  Benzodiazepines: None  Opiates: None  Cocaine: None  Marijuana: None  Other Use: None  Withdrawal Symptoms: Tremors, Nausea  Addictions:  (None endorsed)   Past diagnosis:  Anxiety Disorder, Depression, PTSD, Substance Use Disorder  Family history:  Depression, Substance Use Disorder (Mom = depression and JATIN. Both grand father's = JATIN)  Past treatment:  Individual therapy, Psychiatric Medication Management  Details of most recent treatment:  Pt attends individual therapy monthly and has a med provider through [facility].  Other relevant history:  Pt denied any legal issues.    Have there been any medication changes in the past two weeks:  no       Is the patient compliant with medications:  yes        Collateral Information  Is there collateral information: No (Unable to get in touch with collaterals)      Risk Assessment  [place] Suicide Severity Rating Scale Full Clinical Version:  Suicidal Ideation  Q1 Wish to be Dead (Lifetime): Yes  Q2 Non-Specific Active Suicidal Thoughts (Lifetime): No  3. Active Suicidal Ideation with any Methods (Not Plan) Without Intent to Act (Lifetime): No  4. Active Suicidal Ideation with Some Intent to Act, Without Specific Plan (Lifetime): No  5. Active Suicidal Ideation with Specific Plan and Intent (Lifetime): No  Q6 Suicide Behavior (Lifetime): no  Intensity of Ideation (Lifetime)  Most Severe Ideation Rating (Lifetime): 2  Frequency (Lifetime):  (\"A couple times in High School when parents got a divorce\".)  Duration (Lifetime): Fleeting, few seconds or minutes  Controllability (Lifetime): Easily able to control thoughts  Deterrents (Lifetime): Deterrents definitely stopped you from attempting suicide  Reasons for Ideation (Lifetime): Does not apply  Suicidal Behavior (Lifetime)  Actual " Attempt (Lifetime): No  Has subject engaged in non-suicidal self-injurious behavior? (Lifetime): Yes (Started cutting age 15 or 16, and stopped the same year she started.)  Interrupted Attempts (Lifetime): No  Aborted or Self-Interrupted Attempt (Lifetime): No  Preparatory Acts or Behavior (Lifetime): No    [place] Suicide Severity Rating Scale Recent:   Suicidal Ideation (Recent)  Q1 Wished to be Dead (Past Month): no  Q2 Suicidal Thoughts (Past Month): no  Level of Risk per Screen: no risks indicated  Intensity of Ideation (Recent)  Most Severe Ideation Rating (Past 1 Month):  (None endorsed)  Frequency (Past 1 Month):  (None endorsed)  Duration (Past 1 Month):  (None endorsed)  Controllability (Past 1 Month):  (None endorsed)  Deterrents (Past 1 Month):  (None endorsed)  Reasons for Ideation (Past 1 Month):  (None endorsed)  Suicidal Behavior (Recent)  Actual Attempt (Past 3 Months): No  Total Number of Actual Attempts (Past 3 Months): 0  Has subject engaged in non-suicidal self-injurious behavior? (Past 3 Months): No  Interrupted Attempts (Past 3 Months): No  Total Number of Interrupted Attempts (Past 3 Months): 0  Aborted or Self-Interrupted Attempt (Past 3 Months): No  Total Number of Aborted or Self-Interrupted Attempts (Past 3 Months): 0  Preparatory Acts or Behavior (Past 3 Months): No  Total Number of Preparatory Acts (Past 3 Months): 0    Environmental or Psychosocial Events: other (see comment), ongoing abuse of substances (Work stress, drinking, past events triggering pt currently.)  Protective Factors: Protective Factors: strong bond to family unit, community support, or employment, lives in a responsibly safe and stable environment, sense of belonging, supportive ongoing medical and mental health care relationships, good impulse control, good problem-solving, coping, and conflict resolution skills, sense of importance of health and wellness, cultural, spiritual , or [employer]al beliefs associated with  "meaning and value in life, constructive use of leisure time, enjoyable activities, resilience, reality testing ability    Does the patient have thoughts of harming others? Feels Like Hurting Others: no  Previous Attempt to Hurt Others: no  Current presentation:  (None endorsed, pt is calm and cooperative.)  Is the patient engaging in sexually inappropriate behavior?: no  Does Patient have a known history of aggressive behavior: No  Has aggression occurred as a result of MH concerns/diagnosis: None endorsed  Does patient have history of aggression in hospital: None endorsed    Is the patient engaging in sexually inappropriate behavior?  no        Mental Status Exam   Affect: Appropriate  Appearance: Appropriate  Attention Span/Concentration: Attentive  Eye Contact: Engaged    Fund of Knowledge: Appropriate   Language /Speech Content: Fluent  Language /Speech Volume: Normal  Language /Speech Rate/Productions: Normal  Recent Memory: Intact  Remote Memory: Intact  Mood: Normal  Orientation to Person: Yes   Orientation to Place: Yes  Orientation to Time of Day: Yes  Orientation to Date: Yes     Situation (Do they understand why they are here?): Yes  Psychomotor Behavior: Normal  Thought Content: Clear  Thought Form: Intact     Medication  Psychotropic medications:   Medication Orders - Psychiatric (From admission, onward)      Start     Dose/Rate Route Frequency Ordered Stop    07/28/25 0542  diazepam (VALIUM) tablet 10 mg        Placed in \"Or\" Linked Group    10 mg Oral EVERY 30 MIN PRN 07/28/25 0544      07/28/25 0542  diazepam (VALIUM) injection 5-10 mg        Placed in \"Or\" Linked Group    5-10 mg  over 1-4 Minutes Intravenous EVERY 30 MIN PRN 07/28/25 0544      07/28/25 0542  OLANZapine zydis (zyPREXA) ODT tab 5-10 mg        Placed in \"Or\" Linked Group    5-10 mg Oral EVERY 6 HOURS PRN 07/28/25 0544      07/28/25 0542  haloperidol lactate (HALDOL) injection 2.5-5 mg        Placed in \"Or\" Linked Group    2.5-5 " mg  over 1 Minutes Intravenous EVERY 6 HOURS PRN 07/28/25 0544          Current Care Team  Patient Care Team:  Caery Ramos MD as PCP - General    Diagnosis  Patient Active Problem List   Diagnosis Code    Alcohol withdrawal (H) F10.939    Alcohol withdrawal syndrome without complication (H) F10.930    Ketosis (H) E88.89    Alcoholic intoxication without complication F10.920    Vomiting, unspecified vomiting type, unspecified whether nausea present R11.10    Alcohol withdrawal, uncomplicated (H) F10.930    Alcohol dependence with withdrawal with complication (H) F10.239    Acute alcoholic intoxication in alcoholism without complication (H) F10.220    PTSD (post-traumatic stress disorder) F43.10     Primary Problem This Admission  Active Hospital Problems  F43.10  PTSD (post-traumatic stress disorder)    Clinical Summary and Substantiation of Recommendations   Clinical Substantiation:  After therapeutic assessment, intervention, and aftercare planning by ED care team and LM and in consultation with attending provider, the patient's circumstances and mental state were appropriate for outpatient management. It is the recommendation of this clinician that pt discharge with OP MH support. Currently the pt is not presenting as an acute risk to self or others due to the following factors: Pt denied any thoughts, intent, plans to harm herself or others. Pt is calm and cooperative for the assessment. She appears to have a desire to quit drinking and is seeking a detox admission currently to help manage her withdrawal symptoms. Pt declined any additional mental health services offered today during the assessment.    Goals for crisis stabilization:  Pt is requesting detox to help with her current withdrawal from alcohol.    Next steps for Care Team:  Pt is waiting to enter detox. Please print safety plan for pt prior to discharge.    Treatment Objectives Addressed:  rapport building, assessing safety, safety  planning    Therapeutic Interventions:  Engaged in safety planning, Reviewed healthy living that supports positive mental health, including looking at sleep hygiene, regular movement, nutrition, and regular socialization., Worked on relapse prevention planning (review of stressors, early warning signs, written plan to respond to signs, and rehearse plan)., Identified and practiced coping skills.    Has a specific means been identified for suicidal/homicide actions: No    Patient coping skills attempted to reduce the crisis:  Exercise, walks, TV, music.    Disposition  Recommended referrals: [facility] Comprehensive Assessment, Individual Therapy, Medication Management        Reviewed case and recommendations with attending provider. Attending Name: Dr Katz       Attending concurs with disposition: yes       Patient and/or validated legal guardian concurs with disposition: yes       Final disposition:  discharge      Legal status: Voluntary/Patient has signed consent for treatment                                                                           Reviewed court records: yes     Assessment Details   Total duration spent with the patient: 33 min     CPT code(s) utilized: 02588 - Psychotherapy for Crisis - 60 (30-74*) min    Rosie Underwood [facility], Psychotherapist  [facility] - Triage & Transition Services  Callback: [phone]

## 2025-07-28 NOTE — TELEPHONE ENCOUNTER
S: [facility] ED , Provider Sterling calling at 11:19 AM with clinical on 35 year old/female presenting for alcohol detox.     B: Pt presents for ETOH detox.   Currently reports drinking half a liter of hard alcohol and 2 glasses of wine daily for the last 5 days.  Relapsed 2 weeks  Patient reports last use was last night.  Pt ESTEPHANIA: 0.22  Pt  denies hx of DT  Pt  denies hx of seizures. Last seizure: N/A  Pt endorsing the following symptoms of withdrawal: Tremulous and Anxious  MSSA Score: CIWA is 16    Pt denies acute mental health or medical concerns.   Pt denies other drug use: None Amount/frequency: N/A    Does Pt have a detox care plan in [facility]? No  Does pt present with specific needs, assistive devices, or exclusionary criteria? None  Is the patient ambulating, eating and drinking in the ED? Yes    A: Pt meets criteria to be presented for IP detox admission. Patient is voluntary    COVID Symptoms: No  If yes, COVID test required   Utox: Positive for benzo's- was given by ED Provider  Magnesium: Abnormalities: 1.6  CMP: Abnormalities: ANION GAP 24, CHLORIDE 92, AST 77  CBC: Abnormalities: HEMATOCRIT 34.4, RDW 15.4  HCG: Negative     R: Patient cleared and ready for behavioral bed placement: Yes    Pt is meeting criteria for presentation to 3A/CD    Does Patient need a Transfer Center request created? Yes, writer completed Transfer Center request at:  11:20 AM

## 2025-07-28 NOTE — ED PROVIDER NOTES
"  Emergency Department Note      History of Present Illness     Chief Complaint   Chest Pain and Withdrawal    HPI   Jacquie Henderson is a 35 year old female with a history of alcohol use and withdrawal who presents to the emergency department for a myriad of complaints though predominately concerns for alcohol withdrawl. The patient states that she has a hx of alcohol abuse and has been enrolled in [facility] [place] detox in the past. She reports that she was sober for 2 weeks until 5 days ago, when she began drinking alcohol. She notes that she typically drinks a \"half liter\" of alcohol per day but adds that for the past 5 days, she has been drinking 2 glasses of wine per day. She states that this morning, she awoke at 0200 due to an onset of mid sternal chest \"tightness\" that radiates to the left side of her chest as well as shortness of breath and tremors and is concerned that she is in alcohol withdrawal. She adds that she has also been experiencing ongoing cough and sore throat the past few days. She notes that 2 days ago, she finished a course of Bactrim for a UTI but is still experiencing some dysuria. She also states that she is experiencing suicidal ideations but does not have a plan and has not recently attempted self-harm but in the past, she has cut herself in attempt of self-harm. She adds that she is concerned that all of these symptoms are related to watching her best friend attempt suicide this past week. No fever or chills. No abdominal pain, nausea, vomiting, diarrhea. No black or bloody stools. No vaginal bleeding or discharge. No recent travel. No hx of smoking. No birth control use or hormone use. No hx of DVT/PE. No drug use tonight. No hx of seizures. She currently lives with her grandmother.    Independent Historian   None    Review of External Notes   I reviewed ED note 7/17/25 patient evaluated for alcohol withdrawl    Past Medical History     Medical History and Problem List   Anxiety  Alcohol use " "and withdrawal   Ketosis  Substance abuse  Fibromyalgia  Grave's disease  RA    Medications   disulfiram (ANTABUSE) 250 MG tablet  gabapentin (NEURONTIN) 100 MG capsule  sertraline (ZOLOFT) 50 MG tablet  sulfamethoxazole-trimethoprim (BACTRIM DS) 800-160 MG tablet    Physical Exam     Patient Vitals for the past 24 hrs:   BP Temp Temp src Pulse Resp SpO2 Height Weight   07/28/25 0525 (!) 156/108 97.9  F (36.6  C) Temporal 107 18 99 % 1.727 m (5' 8\") 61.2 kg (134 lb 14.7 oz)     Physical Exam  Nursing note and vitals reviewed.  Constitutional: Well nourished.   Eyes: Conjunctiva normal.  Pupils are equal, round, and reactive to light.   ENT: Nose normal. Mucous membranes pink and moist.  Mild posterior oropharyngeal erythema, no exudate, uvula midline. TM normal.   Neck: Normal range of motion.  CVS: Sinus tachycardia.  Normal heart sounds.   Pulmonary: Lungs clear to auscultation bilaterally. No wheezes/rales/rhonchi.  GI: Abdomen soft. Nontender, nondistended. No rigidity or guarding.    MSK: No calf tenderness or swelling.  Neuro: Alert. Follows simple commands. Mildly tremulous  Skin: Skin is warm and dry. No rash noted.   Psychiatric: Anxious appearing, admits to suicidal ideations, denies hallucinations      Diagnostics     Lab Results   Labs Ordered and Resulted from Time of ED Arrival to Time of ED Departure   COMPREHENSIVE METABOLIC PANEL (LIMITED OCCURRENCES) - Abnormal       Result Value    Sodium 139      Potassium 3.6      Carbon Dioxide (CO2) 23      Anion Gap 24 (*)     Urea Nitrogen 7.0      Creatinine 0.67      GFR Estimate >90      Calcium 8.9      Chloride 92 (*)     Glucose 89      Alkaline Phosphatase 66      AST 77 (*)     ALT 42      Protein Total 7.6      Albumin 4.5      Bilirubin Total 0.7     MAGNESIUM (LIMITED OCCURRENCES) - Abnormal    Magnesium 1.6 (*)    CBC WITH PLATELETS AND DIFFERENTIAL - Abnormal    WBC Count 5.0      RBC Count 4.12      Hemoglobin 11.9      Hematocrit 34.4 (*)     " MCV 84      MCH 28.9      MCHC 34.6      RDW 15.4 (*)     Platelet Count 157      % Neutrophils 63      % Lymphocytes 24      % Monocytes 12      % Eosinophils 0      % Basophils 0      % Immature Granulocytes 0      NRBCs per 100 WBC 0      Absolute Neutrophils 3.1      Absolute Lymphocytes 1.2      Absolute Monocytes 0.6      Absolute Eosinophils 0.0      Absolute Basophils 0.0      Absolute Immature Granulocytes 0.0      Absolute NRBCs 0.0     ETHANOL LEVEL BLOOD - Abnormal    Ethanol Level Blood 0.22 (*)    KETONE BETA-HYDROXYBUTYRATE QUANTITATIVE, RAPID - Abnormal    Ketone (Beta-Hydroxybutyrate) Quantitative 0.96 (*)    BLOOD GAS VENOUS - Abnormal    pH Venous 7.42      pCO2 Venous 41      pO2 Venous 46      Bicarbonate Venous 26      Base Excess/Deficit Venous 1.4      FIO2 21      Oxyhemoglobin Venous 76 (*)     O2 Sat, Venous 77.7 (*)    HCG QUALITATIVE PREGNANCY - Normal    hCG Serum Qualitative Negative     TROPONIN T, HIGH SENSITIVITY - Normal    Troponin T, High Sensitivity <6     LIPASE - Normal    Lipase 24     INFLUENZA A/B, RSV AND SARS-COV2 PCR - Normal    Influenza A PCR Negative      Influenza B PCR Negative      RSV PCR Negative      SARS CoV2 PCR Negative     GROUP A STREPTOCOCCUS PCR THROAT SWAB - Normal    Group A strep by PCR Not Detected     ROUTINE UA WITH MICROSCOPIC       Imaging   XR Chest Port 1 View    (Results Pending)     EKG   ECG taken at 0528, ECG read at 0528  Normal sinus rhythm  RSR' or QR pattern in V1 suggests right ventricular conduction delay   Rate 93 bpm. ND interval 124 ms. QRS duration 94 ms. QT/QTc 384/477 ms. P-R-T axes 46 43 35.     Independent Interpretation   None    ED Course      Medications Administered   Medications   OLANZapine zydis (zyPREXA) ODT tab 5-10 mg (has no administration in time range)     Or   haloperidol lactate (HALDOL) injection 2.5-5 mg (has no administration in time range)   flumazenil (ROMAZICON) injection 0.2 mg (has no administration in  time range)   melatonin tablet 5 mg (has no administration in time range)   cloNIDine (CATAPRES) tablet 0.1 mg (0.1 mg Oral $Given 7/28/25 0555)   diazepam (VALIUM) tablet 10 mg ( Oral See Alternative 7/28/25 0650)     Or   diazepam (VALIUM) injection 5-10 mg (5 mg Intravenous $Given 7/28/25 0650)   multivitamin w/minerals (THERA-VIT-M) tablet 1 tablet (has no administration in time range)   magnesium sulfate 2 g in 50 mL sterile water intermittent infusion (2 g Intravenous $New Bag 7/28/25 0627)   sodium chloride 0.9% BOLUS 500 mL (has no administration in time range)   folic acid (FOLVITE) tablet 1 mg (1 mg Oral $Given 7/28/25 0555)   thiamine (B-1) tablet 100 mg (100 mg Oral $Given 7/28/25 0555)   sodium chloride 0.9% BOLUS 1,000 mL (1,000 mLs Intravenous $New Bag 7/28/25 0549)   famotidine (PEPCID) injection 20 mg (20 mg Intravenous $Given 7/28/25 0554)   ondansetron (ZOFRAN) injection 4 mg (4 mg Intravenous $Given 7/28/25 0554)     Procedures   Procedures     Discussion of Management   None    ED Course   ED Course as of 07/28/25 0729   Mon Jul 28, 2025   0533 I obtained history and examined the patient as noted above.      0725 Patient resting at bedside, clinically sober appearing. Pending DEC     Additional Documentation  None    Medical Decision Making / Diagnosis     CMS Diagnoses: None    MIPS   None    MDM   Jacquie Henderson is a 35 year old female with known history of alcohol abuse presenting with concerns for alcohol withdrawal.  She admits to cough and sore throat as well as chest pain and dysuria.  She underwent extensive workup during her time in the ED.  Notably tremulous on arrival though overall nontoxic.  Screening EKG without ischemic changes, Brugada, WPW or prolonged QTc.  High-sensitivity screening troponin negative.  Low suspicion for ACS.  Wells low risk, doubt PE.  Chest x-ray without focal pneumonia, fluid overload, widened mediastinum or pneumothorax.  Remainder of labs reviewed.  She  has a noted elevated anion gap though strong suspicion this is more secondary to alcohol ketosis.  IV fluids provided.  No profound anemia or significant electrolyte derangements other than mild hypomagnesemia, replacement given.  LFTs/lipase normal.  She is not pregnant.  COVID-19/influenza/RSV negative.  No clinical evidence to suggest strep pharyngitis, peritonsillar abscess, retropharyngeal abscess or epiglottitis.  She also reports dysuria, UA pending at signout.  She has no significant abdominal tenderness on exam and I doubt intra-abdominal catastrophe.  She was given IV Pepcid as well as initiated on CIWA protocol.  Strong suspicion her reported chest pain is more secondary to dyspepsia.  She reported symptom improvement under my observation.  She is clinically sober on reassessment pending formal DEC evaluation given reported suicidal ideations.  She is currently on an THOMAS that was remained cooperative.  She has expressed interest in detox today.        Disposition   Care of the patient was transferred to my colleague Dr. Katz pending UA collection, DEC and consideration for detox.     Diagnosis     ICD-10-CM    1. Suicidal ideation  R45.851       2. Alcohol dependence with uncomplicated withdrawal (H)  F10.230       3. Hypomagnesemia  E83.42       4. Atypical chest pain  R07.89       5. Alcoholic ketosis (H)  E88.89            Discharge Medications   New Prescriptions    No medications on file     Scribe Disclosure:  Shelton EGAN, am serving as a scribe at 5:30 AM on 7/28/2025 to document services personally performed by Clare Howe DO based on my observations and the provider's statements to me.        Clare Howe DO  07/28/25 [id number]

## 2025-07-28 NOTE — ED TRIAGE NOTES
Pt reports chest pain woke her up at 0200. Pt reports midsternal chest pain that radiates to her left chest and nausea. Pt reports chest pain maybe from ETOH withdrawal. Concerned she may of had a seizure this AM. Reports last drink was 1/2 hr ago.

## 2025-07-28 NOTE — CONSULTS
Brief Psychiatry Note   Chart and labs reviewed. Patient meets criteria for 3A detox admission to detox from [facility]. Orders for admission placed for alcohol detox.     Most Recent 2 LFT's:  Recent Labs   Lab Test 07/28/25  0543 07/17/25  0915   AST 77* 55*   ALT 42 30   ALKPHOS 66 65   BILITOTAL 0.7 0.9       Jeanne Rooney, YOLI CNP

## 2025-07-28 NOTE — ED NOTES
I took some this patient from Dr. Howe.  The patient had multiple complaints were addressed by Dr. Howe the concern was alcohol withdrawal.  The patient was discussed with [facility] where she wanted to go for detox but she decided she wanted to go home instead of her 2 Valium she is an appropriate decision maker she says she has arranged outpatient therapy tomorrow at the [place] and she was discharged home in good condition.     Ramsey Katz MD  07/28/25 0316

## 2025-07-29 ENCOUNTER — TELEPHONE (OUTPATIENT)
Dept: BEHAVIORAL HEALTH | Facility: CLINIC | Age: 35
End: 2025-07-29
Payer: COMMERCIAL

## 2025-08-10 ENCOUNTER — HOSPITAL ENCOUNTER (EMERGENCY)
Facility: CLINIC | Age: 35
Discharge: HOME | End: 2025-08-10
Attending: STUDENT IN AN ORGANIZED HEALTH CARE EDUCATION/TRAINING PROGRAM
Payer: COMMERCIAL

## 2025-08-10 ENCOUNTER — APPOINTMENT (OUTPATIENT)
Dept: GENERAL RADIOLOGY | Facility: CLINIC | Age: 35
End: 2025-08-10
Attending: STUDENT IN AN ORGANIZED HEALTH CARE EDUCATION/TRAINING PROGRAM
Payer: COMMERCIAL

## 2025-08-10 VITALS
BODY MASS INDEX: 20.55 KG/M2 | WEIGHT: 130.95 LBS | OXYGEN SATURATION: 97 % | HEART RATE: 137 BPM | TEMPERATURE: 98.1 F | DIASTOLIC BLOOD PRESSURE: 74 MMHG | HEIGHT: 67 IN | SYSTOLIC BLOOD PRESSURE: 118 MMHG | RESPIRATION RATE: 18 BRPM

## 2025-08-10 DIAGNOSIS — F10.930 ALCOHOL WITHDRAWAL, UNCOMPLICATED (H): Primary | ICD-10-CM

## 2025-08-10 DIAGNOSIS — R94.31 PROLONGED Q-T INTERVAL ON ECG: ICD-10-CM

## 2025-08-10 LAB
ALBUMIN SERPL BCG-MCNC: 4.7 G/DL (ref 3.5–5.2)
ALP SERPL-CCNC: 66 U/L (ref 40–150)
ALT SERPL W P-5'-P-CCNC: 35 U/L (ref 0–50)
ANION GAP SERPL CALCULATED.3IONS-SCNC: 29 MMOL/L (ref 7–15)
AST SERPL W P-5'-P-CCNC: 72 U/L (ref 0–45)
BASOPHILS # BLD AUTO: 0.1 10E3/UL (ref 0–0.2)
BASOPHILS NFR BLD AUTO: 2 %
BILIRUB SERPL-MCNC: 0.5 MG/DL
BUN SERPL-MCNC: 11.9 MG/DL (ref 6–20)
CALCIUM SERPL-MCNC: 8.7 MG/DL (ref 8.8–10.4)
CHLORIDE SERPL-SCNC: 93 MMOL/L (ref 98–107)
CREAT SERPL-MCNC: 0.69 MG/DL (ref 0.51–0.95)
EGFRCR SERPLBLD CKD-EPI 2021: >90 ML/MIN/1.73M2
EOSINOPHIL # BLD AUTO: 0 10E3/UL (ref 0–0.7)
EOSINOPHIL NFR BLD AUTO: 0 %
ERYTHROCYTE [DISTWIDTH] IN BLOOD BY AUTOMATED COUNT: 15.2 % (ref 10–15)
GLUCOSE SERPL-MCNC: 94 MG/DL (ref 70–99)
HCG SERPL QL: NEGATIVE
HCO3 SERPL-SCNC: 17 MMOL/L (ref 22–29)
HCT VFR BLD AUTO: 38.3 % (ref 35–47)
HGB BLD-MCNC: 13.3 G/DL (ref 11.7–15.7)
HOLD SPECIMEN: NORMAL
IMM GRANULOCYTES # BLD: 0 10E3/UL
IMM GRANULOCYTES NFR BLD: 0 %
LIPASE SERPL-CCNC: 32 U/L (ref 13–60)
LYMPHOCYTES # BLD AUTO: 1.1 10E3/UL (ref 0.8–5.3)
LYMPHOCYTES NFR BLD AUTO: 28 %
MAGNESIUM SERPL-MCNC: 1.9 MG/DL (ref 1.7–2.3)
MCH RBC QN AUTO: 29.2 PG (ref 26.5–33)
MCHC RBC AUTO-ENTMCNC: 34.7 G/DL (ref 31.5–36.5)
MCV RBC AUTO: 84 FL (ref 78–100)
MONOCYTES # BLD AUTO: 0.3 10E3/UL (ref 0–1.3)
MONOCYTES NFR BLD AUTO: 8 %
NEUTROPHILS # BLD AUTO: 2.5 10E3/UL (ref 1.6–8.3)
NEUTROPHILS NFR BLD AUTO: 62 %
NRBC # BLD AUTO: 0 10E3/UL
NRBC BLD AUTO-RTO: 0 /100
PLATELET # BLD AUTO: 318 10E3/UL (ref 150–450)
POTASSIUM SERPL-SCNC: 3.9 MMOL/L (ref 3.4–5.3)
PROT SERPL-MCNC: 7.8 G/DL (ref 6.4–8.3)
RBC # BLD AUTO: 4.55 10E6/UL (ref 3.8–5.2)
SODIUM SERPL-SCNC: 139 MMOL/L (ref 135–145)
WBC # BLD AUTO: 4 10E3/UL (ref 4–11)

## 2025-08-10 PROCEDURE — 80053 COMPREHEN METABOLIC PANEL: CPT | Performed by: STUDENT IN AN ORGANIZED HEALTH CARE EDUCATION/TRAINING PROGRAM

## 2025-08-10 PROCEDURE — 96374 THER/PROPH/DIAG INJ IV PUSH: CPT

## 2025-08-10 PROCEDURE — 99285 EMERGENCY DEPT VISIT HI MDM: CPT | Mod: 25 | Performed by: STUDENT IN AN ORGANIZED HEALTH CARE EDUCATION/TRAINING PROGRAM

## 2025-08-10 PROCEDURE — 250N000013 HC RX MED GY IP 250 OP 250 PS 637: Performed by: STUDENT IN AN ORGANIZED HEALTH CARE EDUCATION/TRAINING PROGRAM

## 2025-08-10 PROCEDURE — 250N000011 HC RX IP 250 OP 636: Performed by: STUDENT IN AN ORGANIZED HEALTH CARE EDUCATION/TRAINING PROGRAM

## 2025-08-10 PROCEDURE — 96361 HYDRATE IV INFUSION ADD-ON: CPT

## 2025-08-10 PROCEDURE — 83690 ASSAY OF LIPASE: CPT | Performed by: STUDENT IN AN ORGANIZED HEALTH CARE EDUCATION/TRAINING PROGRAM

## 2025-08-10 PROCEDURE — 85004 AUTOMATED DIFF WBC COUNT: CPT | Performed by: STUDENT IN AN ORGANIZED HEALTH CARE EDUCATION/TRAINING PROGRAM

## 2025-08-10 PROCEDURE — 93005 ELECTROCARDIOGRAM TRACING: CPT

## 2025-08-10 PROCEDURE — 258N000003 HC RX IP 258 OP 636: Performed by: STUDENT IN AN ORGANIZED HEALTH CARE EDUCATION/TRAINING PROGRAM

## 2025-08-10 PROCEDURE — 71046 X-RAY EXAM CHEST 2 VIEWS: CPT

## 2025-08-10 PROCEDURE — 36415 COLL VENOUS BLD VENIPUNCTURE: CPT | Performed by: STUDENT IN AN ORGANIZED HEALTH CARE EDUCATION/TRAINING PROGRAM

## 2025-08-10 PROCEDURE — 83735 ASSAY OF MAGNESIUM: CPT | Performed by: STUDENT IN AN ORGANIZED HEALTH CARE EDUCATION/TRAINING PROGRAM

## 2025-08-10 PROCEDURE — 84703 CHORIONIC GONADOTROPIN ASSAY: CPT | Performed by: STUDENT IN AN ORGANIZED HEALTH CARE EDUCATION/TRAINING PROGRAM

## 2025-08-10 RX ORDER — HALOPERIDOL 5 MG/ML
2.5-5 INJECTION INTRAMUSCULAR EVERY 6 HOURS PRN
Status: DISCONTINUED | OUTPATIENT
Start: 2025-08-10 | End: 2025-08-10 | Stop reason: HOSPADM

## 2025-08-10 RX ORDER — DEXTROSE MONOHYDRATE AND SODIUM CHLORIDE 5; .9 G/100ML; G/100ML
INJECTION, SOLUTION INTRAVENOUS ONCE
Status: COMPLETED | OUTPATIENT
Start: 2025-08-10 | End: 2025-08-10

## 2025-08-10 RX ORDER — GABAPENTIN 100 MG/1
100 CAPSULE ORAL EVERY 8 HOURS
Status: DISCONTINUED | OUTPATIENT
Start: 2025-08-17 | End: 2025-08-10 | Stop reason: HOSPADM

## 2025-08-10 RX ORDER — OLANZAPINE 5 MG/1
5-10 TABLET, ORALLY DISINTEGRATING ORAL EVERY 6 HOURS PRN
Status: DISCONTINUED | OUTPATIENT
Start: 2025-08-10 | End: 2025-08-10 | Stop reason: HOSPADM

## 2025-08-10 RX ORDER — FLUMAZENIL 0.1 MG/ML
0.2 INJECTION, SOLUTION INTRAVENOUS
Status: DISCONTINUED | OUTPATIENT
Start: 2025-08-10 | End: 2025-08-10 | Stop reason: HOSPADM

## 2025-08-10 RX ORDER — KETOROLAC TROMETHAMINE 15 MG/ML
15 INJECTION, SOLUTION INTRAMUSCULAR; INTRAVENOUS ONCE
Status: COMPLETED | OUTPATIENT
Start: 2025-08-10 | End: 2025-08-10

## 2025-08-10 RX ORDER — MAGNESIUM HYDROXIDE/ALUMINUM HYDROXICE/SIMETHICONE 120; 1200; 1200 MG/30ML; MG/30ML; MG/30ML
15 SUSPENSION ORAL ONCE
Status: COMPLETED | OUTPATIENT
Start: 2025-08-10 | End: 2025-08-10

## 2025-08-10 RX ORDER — DIAZEPAM 10 MG/2ML
5-10 INJECTION, SOLUTION INTRAMUSCULAR; INTRAVENOUS EVERY 30 MIN PRN
Status: DISCONTINUED | OUTPATIENT
Start: 2025-08-10 | End: 2025-08-10 | Stop reason: HOSPADM

## 2025-08-10 RX ORDER — DIAZEPAM 5 MG/1
10 TABLET ORAL EVERY 30 MIN PRN
Status: DISCONTINUED | OUTPATIENT
Start: 2025-08-10 | End: 2025-08-10 | Stop reason: HOSPADM

## 2025-08-10 RX ORDER — MULTIPLE VITAMINS W/ MINERALS TAB 9MG-400MCG
1 TAB ORAL DAILY
Status: DISCONTINUED | OUTPATIENT
Start: 2025-08-10 | End: 2025-08-10 | Stop reason: HOSPADM

## 2025-08-10 RX ORDER — CLONIDINE HYDROCHLORIDE 0.1 MG/1
0.1 TABLET ORAL EVERY 8 HOURS
Status: DISCONTINUED | OUTPATIENT
Start: 2025-08-10 | End: 2025-08-10 | Stop reason: HOSPADM

## 2025-08-10 RX ORDER — GABAPENTIN 300 MG/1
900 CAPSULE ORAL EVERY 8 HOURS
Status: DISCONTINUED | OUTPATIENT
Start: 2025-08-10 | End: 2025-08-10 | Stop reason: HOSPADM

## 2025-08-10 RX ORDER — GABAPENTIN 600 MG/1
1200 TABLET ORAL ONCE
Status: COMPLETED | OUTPATIENT
Start: 2025-08-10 | End: 2025-08-10

## 2025-08-10 RX ORDER — GABAPENTIN 300 MG/1
600 CAPSULE ORAL EVERY 8 HOURS
Status: DISCONTINUED | OUTPATIENT
Start: 2025-08-13 | End: 2025-08-10 | Stop reason: HOSPADM

## 2025-08-10 RX ORDER — FOLIC ACID 1 MG/1
1 TABLET ORAL DAILY
Status: DISCONTINUED | OUTPATIENT
Start: 2025-08-10 | End: 2025-08-10 | Stop reason: HOSPADM

## 2025-08-10 RX ORDER — GABAPENTIN 300 MG/1
300 CAPSULE ORAL EVERY 8 HOURS
Status: DISCONTINUED | OUTPATIENT
Start: 2025-08-15 | End: 2025-08-10 | Stop reason: HOSPADM

## 2025-08-10 RX ADMIN — THIAMINE HCL TAB 100 MG 100 MG: 100 TAB at 14:41

## 2025-08-10 RX ADMIN — GABAPENTIN 1200 MG: 600 TABLET, FILM COATED ORAL at 14:42

## 2025-08-10 RX ADMIN — ALUMINUM HYDROXIDE, MAGNESIUM HYDROXIDE, AND SIMETHICONE 15 ML: 200; 200; 20 SUSPENSION ORAL at 14:42

## 2025-08-10 RX ADMIN — DEXTROSE AND SODIUM CHLORIDE: 5; .9 INJECTION, SOLUTION INTRAVENOUS at 14:45

## 2025-08-10 RX ADMIN — DIAZEPAM 10 MG: 10 INJECTION, SOLUTION INTRAMUSCULAR; INTRAVENOUS at 16:53

## 2025-08-10 RX ADMIN — DIAZEPAM 10 MG: 5 TABLET ORAL at 17:25

## 2025-08-10 RX ADMIN — KETOROLAC TROMETHAMINE 15 MG: 15 INJECTION, SOLUTION INTRAMUSCULAR; INTRAVENOUS at 14:42

## 2025-08-10 RX ADMIN — DIAZEPAM 10 MG: 5 TABLET ORAL at 15:23

## 2025-08-10 RX ADMIN — FOLIC ACID 1 MG: 1 TABLET ORAL at 14:41

## 2025-08-10 RX ADMIN — DIAZEPAM 10 MG: 5 TABLET ORAL at 14:42

## 2025-08-10 RX ADMIN — Medication 1 TABLET: at 14:41

## 2025-08-10 RX ADMIN — CLONIDINE HYDROCHLORIDE 0.1 MG: 0.1 TABLET ORAL at 14:42

## 2025-08-10 RX ADMIN — DIAZEPAM 10 MG: 5 TABLET ORAL at 16:01

## 2025-08-10 ASSESSMENT — LIFESTYLE VARIABLES
TOTAL SCORE: 21
TOTAL SCORE: 15
TACTILE DISTURBANCES: MILD ITCHING, PINS AND NEEDLES, BURNING OR NUMBNESS
NAUSEA AND VOMITING: 2
ANXIETY: 5
AGITATION: NORMAL ACTIVITY
NAUSEA AND VOMITING: NO NAUSEA AND NO VOMITING
TACTILE DISTURBANCES: MILD ITCHING, PINS AND NEEDLES, BURNING OR NUMBNESS
TOTAL SCORE: 6
ORIENTATION AND CLOUDING OF SENSORIUM: ORIENTED AND CAN DO SERIAL ADDITIONS
PAROXYSMAL SWEATS: NO SWEAT VISIBLE
ANXIETY: 3
VISUAL DISTURBANCES: NOT PRESENT
ORIENTATION AND CLOUDING OF SENSORIUM: ORIENTED AND CAN DO SERIAL ADDITIONS
TREMOR: 5
HEADACHE, FULLNESS IN HEAD: VERY MILD
TREMOR: NOT VISIBLE, BUT CAN BE FELT FINGERTIP TO FINGERTIP
VISUAL DISTURBANCES: NOT PRESENT
TREMOR: 3
AUDITORY DISTURBANCES: NOT PRESENT
ANXIETY: 5
ANXIETY: 2
AGITATION: NORMAL ACTIVITY
AUDITORY DISTURBANCES: NOT PRESENT
TACTILE DISTURBANCES: MILD ITCHING, PINS AND NEEDLES, BURNING OR NUMBNESS
AUDITORY DISTURBANCES: NOT PRESENT
PAROXYSMAL SWEATS: 3
VISUAL DISTURBANCES: MILD SENSITIVITY
HEADACHE, FULLNESS IN HEAD: MILD
ORIENTATION AND CLOUDING OF SENSORIUM: ORIENTED AND CAN DO SERIAL ADDITIONS
AGITATION: NORMAL ACTIVITY
NAUSEA AND VOMITING: MILD NAUSEA WITH NO VOMITING
ORIENTATION AND CLOUDING OF SENSORIUM: ORIENTED AND CAN DO SERIAL ADDITIONS
TOTAL SCORE: 24
TREMOR: MODERATE, WITH PATIENT'S ARMS EXTENDED
VISUAL DISTURBANCES: MILD SENSITIVITY
AGITATION: NORMAL ACTIVITY
TOTAL SCORE: 12
HEADACHE, FULLNESS IN HEAD: MILD
TACTILE DISTURBANCES: MODERATE ITCHING, PINS AND NEEDLES, BURNING OR NUMBNESS
PAROXYSMAL SWEATS: BARELY PERCEPTIBLE SWEATING, PALMS MOIST
TACTILE DISTURBANCES: MILD ITCHING, PINS AND NEEDLES, BURNING OR NUMBNESS
AUDITORY DISTURBANCES: NOT PRESENT
NAUSEA AND VOMITING: 5
HEADACHE, FULLNESS IN HEAD: MILD
VISUAL DISTURBANCES: MILD SENSITIVITY
PAROXYSMAL SWEATS: NO SWEAT VISIBLE
ORIENTATION AND CLOUDING OF SENSORIUM: ORIENTED AND CAN DO SERIAL ADDITIONS
NAUSEA AND VOMITING: MILD NAUSEA WITH NO VOMITING
NAUSEA AND VOMITING: INTERMITTENT NAUSEA WITH DRY HEAVES
ANXIETY: 5
ANXIETY: 5
TACTILE DISTURBANCES: MODERATE ITCHING, PINS AND NEEDLES, BURNING OR NUMBNESS
AUDITORY DISTURBANCES: NOT PRESENT
AUDITORY DISTURBANCES: NOT PRESENT
TREMOR: MODERATE, WITH PATIENT'S ARMS EXTENDED
TREMOR: 5
PAROXYSMAL SWEATS: BEADS OF SWEAT OBVIOUS ON FOREHEAD
AGITATION: NORMAL ACTIVITY
TOTAL SCORE: 24
PAROXYSMAL SWEATS: NO SWEAT VISIBLE
VISUAL DISTURBANCES: VERY MILD SENSITIVITY
HEADACHE, FULLNESS IN HEAD: MODERATELY SEVERE
ORIENTATION AND CLOUDING OF SENSORIUM: ORIENTED AND CAN DO SERIAL ADDITIONS
HEADACHE, FULLNESS IN HEAD: MILD
AGITATION: NORMAL ACTIVITY

## 2025-08-10 ASSESSMENT — ACTIVITIES OF DAILY LIVING (ADL)
ADLS_ACUITY_SCORE: 56

## 2025-08-10 ASSESSMENT — COLUMBIA-SUICIDE SEVERITY RATING SCALE - C-SSRS
6. HAVE YOU EVER DONE ANYTHING, STARTED TO DO ANYTHING, OR PREPARED TO DO ANYTHING TO END YOUR LIFE?: YES
1. IN THE PAST MONTH, HAVE YOU WISHED YOU WERE DEAD OR WISHED YOU COULD GO TO SLEEP AND NOT WAKE UP?: NO
2. HAVE YOU ACTUALLY HAD ANY THOUGHTS OF KILLING YOURSELF IN THE PAST MONTH?: NO

## 2025-08-11 LAB
ATRIAL RATE - MUSE: 103 BPM
ATRIAL RATE - MUSE: 137 BPM
DIASTOLIC BLOOD PRESSURE - MUSE: NORMAL MMHG
DIASTOLIC BLOOD PRESSURE - MUSE: NORMAL MMHG
INTERPRETATION ECG - MUSE: NORMAL
INTERPRETATION ECG - MUSE: NORMAL
P AXIS - MUSE: 21 DEGREES
P AXIS - MUSE: 64 DEGREES
PR INTERVAL - MUSE: 120 MS
PR INTERVAL - MUSE: 134 MS
QRS DURATION - MUSE: 82 MS
QRS DURATION - MUSE: 88 MS
QT - MUSE: 300 MS
QT - MUSE: 372 MS
QTC - MUSE: 453 MS
QTC - MUSE: 487 MS
R AXIS - MUSE: 22 DEGREES
R AXIS - MUSE: 43 DEGREES
SYSTOLIC BLOOD PRESSURE - MUSE: NORMAL MMHG
SYSTOLIC BLOOD PRESSURE - MUSE: NORMAL MMHG
T AXIS - MUSE: 15 DEGREES
T AXIS - MUSE: 29 DEGREES
VENTRICULAR RATE- MUSE: 103 BPM
VENTRICULAR RATE- MUSE: 137 BPM

## 2025-08-15 ENCOUNTER — HOSPITAL ENCOUNTER (EMERGENCY)
Facility: CLINIC | Age: 35
Discharge: HOME | End: 2025-08-15
Attending: FAMILY MEDICINE | Admitting: FAMILY MEDICINE
Payer: COMMERCIAL

## 2025-08-15 VITALS
DIASTOLIC BLOOD PRESSURE: 86 MMHG | HEART RATE: 96 BPM | SYSTOLIC BLOOD PRESSURE: 124 MMHG | RESPIRATION RATE: 14 BRPM | TEMPERATURE: 98.5 F | OXYGEN SATURATION: 98 %

## 2025-08-15 DIAGNOSIS — F10.229 ALCOHOL DEPENDENCE WITH INTOXICATION WITH COMPLICATION (H): Primary | ICD-10-CM

## 2025-08-15 LAB
ALBUMIN SERPL BCG-MCNC: 4.3 G/DL (ref 3.5–5.2)
ALP SERPL-CCNC: 87 U/L (ref 40–150)
ALT SERPL W P-5'-P-CCNC: 64 U/L (ref 0–50)
ANION GAP SERPL CALCULATED.3IONS-SCNC: 20 MMOL/L (ref 7–15)
AST SERPL W P-5'-P-CCNC: 118 U/L (ref 0–45)
BASOPHILS # BLD AUTO: 0.03 10E3/UL (ref 0–0.2)
BASOPHILS NFR BLD AUTO: 0.7 %
BILIRUB SERPL-MCNC: 0.8 MG/DL
BUN SERPL-MCNC: 11.2 MG/DL (ref 6–20)
CALCIUM SERPL-MCNC: 8.1 MG/DL (ref 8.8–10.4)
CHLORIDE SERPL-SCNC: 99 MMOL/L (ref 98–107)
CREAT SERPL-MCNC: 0.61 MG/DL (ref 0.51–0.95)
EGFRCR SERPLBLD CKD-EPI 2021: >90 ML/MIN/1.73M2
EOSINOPHIL # BLD AUTO: <0.03 10E3/UL (ref 0–0.7)
EOSINOPHIL NFR BLD AUTO: 0.2 %
ERYTHROCYTE [DISTWIDTH] IN BLOOD BY AUTOMATED COUNT: 14.5 % (ref 10–15)
ETHANOL SERPL-MCNC: 0.42 G/DL
GLUCOSE SERPL-MCNC: 95 MG/DL (ref 70–99)
HCG SERPL QL: NEGATIVE
HCO3 SERPL-SCNC: 23 MMOL/L (ref 22–29)
HCT VFR BLD AUTO: 38.5 % (ref 35–47)
HGB BLD-MCNC: 13.3 G/DL (ref 11.7–15.7)
IMM GRANULOCYTES # BLD: <0.03 10E3/UL
IMM GRANULOCYTES NFR BLD: 0.2 %
LYMPHOCYTES # BLD AUTO: 0.64 10E3/UL (ref 0.8–5.3)
LYMPHOCYTES NFR BLD AUTO: 15.2 %
MAGNESIUM SERPL-MCNC: 1.9 MG/DL (ref 1.7–2.3)
MCH RBC QN AUTO: 28.5 PG (ref 26.5–33)
MCHC RBC AUTO-ENTMCNC: 34.5 G/DL (ref 31.5–36.5)
MCV RBC AUTO: 82.4 FL (ref 78–100)
MONOCYTES # BLD AUTO: 0.21 10E3/UL (ref 0–1.3)
MONOCYTES NFR BLD AUTO: 5 %
NEUTROPHILS # BLD AUTO: 3.31 10E3/UL (ref 1.6–8.3)
NEUTROPHILS NFR BLD AUTO: 78.7 %
NRBC # BLD AUTO: <0.03 10E3/UL
NRBC BLD AUTO-RTO: 0 /100
PLATELET # BLD AUTO: 195 10E3/UL (ref 150–450)
POTASSIUM SERPL-SCNC: 3.6 MMOL/L (ref 3.4–5.3)
PROT SERPL-MCNC: 7.7 G/DL (ref 6.4–8.3)
RBC # BLD AUTO: 4.67 10E6/UL (ref 3.8–5.2)
SODIUM SERPL-SCNC: 142 MMOL/L (ref 135–145)
WBC # BLD AUTO: 4.21 10E3/UL (ref 4–11)

## 2025-08-15 PROCEDURE — 85025 COMPLETE CBC W/AUTO DIFF WBC: CPT | Performed by: FAMILY MEDICINE

## 2025-08-15 PROCEDURE — 83735 ASSAY OF MAGNESIUM: CPT | Performed by: FAMILY MEDICINE

## 2025-08-15 PROCEDURE — 82077 ASSAY SPEC XCP UR&BREATH IA: CPT | Performed by: FAMILY MEDICINE

## 2025-08-15 PROCEDURE — 250N000011 HC RX IP 250 OP 636: Performed by: FAMILY MEDICINE

## 2025-08-15 PROCEDURE — 84703 CHORIONIC GONADOTROPIN ASSAY: CPT | Performed by: FAMILY MEDICINE

## 2025-08-15 PROCEDURE — 99284 EMERGENCY DEPT VISIT MOD MDM: CPT | Mod: 25 | Performed by: FAMILY MEDICINE

## 2025-08-15 PROCEDURE — 258N000003 HC RX IP 258 OP 636: Performed by: FAMILY MEDICINE

## 2025-08-15 PROCEDURE — 96374 THER/PROPH/DIAG INJ IV PUSH: CPT | Performed by: FAMILY MEDICINE

## 2025-08-15 PROCEDURE — 96376 TX/PRO/DX INJ SAME DRUG ADON: CPT | Performed by: FAMILY MEDICINE

## 2025-08-15 PROCEDURE — 96361 HYDRATE IV INFUSION ADD-ON: CPT | Performed by: FAMILY MEDICINE

## 2025-08-15 PROCEDURE — 99207 PR NO CHARGE LOS: CPT | Performed by: PSYCHIATRY & NEUROLOGY

## 2025-08-15 PROCEDURE — 36415 COLL VENOUS BLD VENIPUNCTURE: CPT | Performed by: FAMILY MEDICINE

## 2025-08-15 PROCEDURE — 84155 ASSAY OF PROTEIN SERUM: CPT | Performed by: FAMILY MEDICINE

## 2025-08-15 PROCEDURE — 99285 EMERGENCY DEPT VISIT HI MDM: CPT | Performed by: FAMILY MEDICINE

## 2025-08-15 RX ORDER — LORAZEPAM 1 MG/1
1-2 TABLET ORAL EVERY 30 MIN PRN
Status: DISCONTINUED | OUTPATIENT
Start: 2025-08-15 | End: 2025-08-16 | Stop reason: HOSPADM

## 2025-08-15 RX ORDER — TRAZODONE HYDROCHLORIDE 50 MG/1
50 TABLET ORAL
OUTPATIENT
Start: 2025-08-15

## 2025-08-15 RX ORDER — OLANZAPINE 10 MG/1
10 TABLET, FILM COATED ORAL 3 TIMES DAILY PRN
OUTPATIENT
Start: 2025-08-15

## 2025-08-15 RX ORDER — SODIUM CHLORIDE 9 MG/ML
INJECTION, SOLUTION INTRAVENOUS CONTINUOUS
Status: DISCONTINUED | OUTPATIENT
Start: 2025-08-15 | End: 2025-08-16 | Stop reason: HOSPADM

## 2025-08-15 RX ORDER — ONDANSETRON 4 MG/1
4 TABLET, ORALLY DISINTEGRATING ORAL EVERY 6 HOURS PRN
Status: DISCONTINUED | OUTPATIENT
Start: 2025-08-15 | End: 2025-08-16 | Stop reason: HOSPADM

## 2025-08-15 RX ORDER — LOPERAMIDE HYDROCHLORIDE 2 MG/1
2 CAPSULE ORAL 4 TIMES DAILY PRN
OUTPATIENT
Start: 2025-08-15

## 2025-08-15 RX ORDER — MAGNESIUM HYDROXIDE/ALUMINUM HYDROXICE/SIMETHICONE 120; 1200; 1200 MG/30ML; MG/30ML; MG/30ML
30 SUSPENSION ORAL EVERY 4 HOURS PRN
OUTPATIENT
Start: 2025-08-15

## 2025-08-15 RX ORDER — HYDROXYZINE HYDROCHLORIDE 25 MG/1
25 TABLET, FILM COATED ORAL EVERY 4 HOURS PRN
OUTPATIENT
Start: 2025-08-15

## 2025-08-15 RX ORDER — ONDANSETRON 2 MG/ML
4 INJECTION INTRAMUSCULAR; INTRAVENOUS EVERY 30 MIN PRN
Status: DISCONTINUED | OUTPATIENT
Start: 2025-08-15 | End: 2025-08-16 | Stop reason: HOSPADM

## 2025-08-15 RX ORDER — OLANZAPINE 10 MG/2ML
10 INJECTION, POWDER, FOR SOLUTION INTRAMUSCULAR 3 TIMES DAILY PRN
OUTPATIENT
Start: 2025-08-15

## 2025-08-15 RX ORDER — AMOXICILLIN 250 MG
1 CAPSULE ORAL 2 TIMES DAILY PRN
OUTPATIENT
Start: 2025-08-15

## 2025-08-15 RX ORDER — MULTIPLE VITAMINS W/ MINERALS TAB 9MG-400MCG
1 TAB ORAL DAILY
Status: DISCONTINUED | OUTPATIENT
Start: 2025-08-15 | End: 2025-08-16 | Stop reason: HOSPADM

## 2025-08-15 RX ORDER — ACETAMINOPHEN 325 MG/1
650 TABLET ORAL EVERY 4 HOURS PRN
OUTPATIENT
Start: 2025-08-15

## 2025-08-15 RX ORDER — SODIUM CHLORIDE 9 MG/ML
INJECTION, SOLUTION INTRAVENOUS
Status: DISCONTINUED
Start: 2025-08-15 | End: 2025-08-15 | Stop reason: HOSPADM

## 2025-08-15 RX ORDER — FOLIC ACID 1 MG/1
1 TABLET ORAL DAILY
Status: DISCONTINUED | OUTPATIENT
Start: 2025-08-15 | End: 2025-08-16 | Stop reason: HOSPADM

## 2025-08-15 RX ADMIN — ONDANSETRON 4 MG: 2 INJECTION INTRAMUSCULAR; INTRAVENOUS at 19:54

## 2025-08-15 RX ADMIN — ONDANSETRON 4 MG: 2 INJECTION INTRAMUSCULAR; INTRAVENOUS at 18:14

## 2025-08-15 RX ADMIN — SODIUM CHLORIDE 1000 ML: 0.9 INJECTION, SOLUTION INTRAVENOUS at 17:20

## 2025-08-15 RX ADMIN — SODIUM CHLORIDE: 0.9 INJECTION, SOLUTION INTRAVENOUS at 18:14

## 2025-08-15 ASSESSMENT — ACTIVITIES OF DAILY LIVING (ADL)
ADLS_ACUITY_SCORE: 56

## 2025-08-16 ENCOUNTER — HOSPITAL ENCOUNTER (EMERGENCY)
Facility: CLINIC | Age: 35
Discharge: OTHER INSTITUTION | End: 2025-08-17
Attending: EMERGENCY MEDICINE | Admitting: EMERGENCY MEDICINE
Payer: COMMERCIAL

## 2025-08-16 DIAGNOSIS — E83.42 HYPOMAGNESEMIA: ICD-10-CM

## 2025-08-16 DIAGNOSIS — K70.10 ALCOHOLIC HEPATITIS WITHOUT ASCITES (H): ICD-10-CM

## 2025-08-16 DIAGNOSIS — F10.920 ALCOHOLIC INTOXICATION WITHOUT COMPLICATION: Primary | ICD-10-CM

## 2025-08-16 DIAGNOSIS — F41.1 GENERALIZED ANXIETY DISORDER WITH PANIC ATTACKS: ICD-10-CM

## 2025-08-16 DIAGNOSIS — F41.0 GENERALIZED ANXIETY DISORDER WITH PANIC ATTACKS: ICD-10-CM

## 2025-08-16 LAB
ALBUMIN SERPL BCG-MCNC: 4.2 G/DL (ref 3.5–5.2)
ALP SERPL-CCNC: 85 U/L (ref 40–150)
ALT SERPL W P-5'-P-CCNC: 59 U/L (ref 0–50)
ANION GAP SERPL CALCULATED.3IONS-SCNC: 17 MMOL/L (ref 7–15)
AST SERPL W P-5'-P-CCNC: 110 U/L (ref 0–45)
BASOPHILS # BLD AUTO: <0.03 10E3/UL (ref 0–0.2)
BASOPHILS NFR BLD AUTO: 0.8 %
BILIRUB SERPL-MCNC: 0.8 MG/DL
BUN SERPL-MCNC: 8.9 MG/DL (ref 6–20)
CALCIUM SERPL-MCNC: 8.1 MG/DL (ref 8.8–10.4)
CHLORIDE SERPL-SCNC: 102 MMOL/L (ref 98–107)
CREAT SERPL-MCNC: 0.69 MG/DL (ref 0.51–0.95)
EGFRCR SERPLBLD CKD-EPI 2021: >90 ML/MIN/1.73M2
EOSINOPHIL # BLD AUTO: <0.03 10E3/UL (ref 0–0.7)
EOSINOPHIL NFR BLD AUTO: 0.8 %
ERYTHROCYTE [DISTWIDTH] IN BLOOD BY AUTOMATED COUNT: 14.4 % (ref 10–15)
ETHANOL SERPL-MCNC: 0.25 G/DL
GLUCOSE SERPL-MCNC: 131 MG/DL (ref 70–99)
HCG SERPL QL: NEGATIVE
HCO3 SERPL-SCNC: 22 MMOL/L (ref 22–29)
HCT VFR BLD AUTO: 34.9 % (ref 35–47)
HGB BLD-MCNC: 11.9 G/DL (ref 11.7–15.7)
HOLD SPECIMEN: NORMAL
IMM GRANULOCYTES # BLD: <0.03 10E3/UL
IMM GRANULOCYTES NFR BLD: 0.4 %
LIPASE SERPL-CCNC: 61 U/L (ref 13–60)
LYMPHOCYTES # BLD AUTO: 0.67 10E3/UL (ref 0.8–5.3)
LYMPHOCYTES NFR BLD AUTO: 25.4 %
MAGNESIUM SERPL-MCNC: 1.6 MG/DL (ref 1.7–2.3)
MCH RBC QN AUTO: 28.4 PG (ref 26.5–33)
MCHC RBC AUTO-ENTMCNC: 34.1 G/DL (ref 31.5–36.5)
MCV RBC AUTO: 83.3 FL (ref 78–100)
MONOCYTES # BLD AUTO: 0.22 10E3/UL (ref 0–1.3)
MONOCYTES NFR BLD AUTO: 8.3 %
NEUTROPHILS # BLD AUTO: 1.7 10E3/UL (ref 1.6–8.3)
NEUTROPHILS NFR BLD AUTO: 64.3 %
NRBC # BLD AUTO: <0.03 10E3/UL
NRBC BLD AUTO-RTO: 0 /100
PHOSPHATE SERPL-MCNC: 2.2 MG/DL (ref 2.5–4.5)
PLATELET # BLD AUTO: 159 10E3/UL (ref 150–450)
POTASSIUM SERPL-SCNC: 3.4 MMOL/L (ref 3.4–5.3)
PROT SERPL-MCNC: 7.1 G/DL (ref 6.4–8.3)
RBC # BLD AUTO: 4.19 10E6/UL (ref 3.8–5.2)
SODIUM SERPL-SCNC: 141 MMOL/L (ref 135–145)
WBC # BLD AUTO: 2.64 10E3/UL (ref 4–11)

## 2025-08-16 PROCEDURE — 250N000011 HC RX IP 250 OP 636: Performed by: EMERGENCY MEDICINE

## 2025-08-16 PROCEDURE — 82077 ASSAY SPEC XCP UR&BREATH IA: CPT | Performed by: EMERGENCY MEDICINE

## 2025-08-16 PROCEDURE — 250N000013 HC RX MED GY IP 250 OP 250 PS 637: Performed by: EMERGENCY MEDICINE

## 2025-08-16 PROCEDURE — 96375 TX/PRO/DX INJ NEW DRUG ADDON: CPT | Performed by: EMERGENCY MEDICINE

## 2025-08-16 PROCEDURE — 96376 TX/PRO/DX INJ SAME DRUG ADON: CPT | Performed by: EMERGENCY MEDICINE

## 2025-08-16 PROCEDURE — 93005 ELECTROCARDIOGRAM TRACING: CPT | Performed by: EMERGENCY MEDICINE

## 2025-08-16 PROCEDURE — 84100 ASSAY OF PHOSPHORUS: CPT | Performed by: EMERGENCY MEDICINE

## 2025-08-16 PROCEDURE — 84703 CHORIONIC GONADOTROPIN ASSAY: CPT | Performed by: EMERGENCY MEDICINE

## 2025-08-16 PROCEDURE — 99291 CRITICAL CARE FIRST HOUR: CPT | Mod: 25 | Performed by: EMERGENCY MEDICINE

## 2025-08-16 PROCEDURE — 258N000003 HC RX IP 258 OP 636: Performed by: EMERGENCY MEDICINE

## 2025-08-16 PROCEDURE — 96361 HYDRATE IV INFUSION ADD-ON: CPT | Performed by: EMERGENCY MEDICINE

## 2025-08-16 PROCEDURE — 83735 ASSAY OF MAGNESIUM: CPT | Performed by: EMERGENCY MEDICINE

## 2025-08-16 PROCEDURE — 83690 ASSAY OF LIPASE: CPT | Performed by: EMERGENCY MEDICINE

## 2025-08-16 PROCEDURE — 85004 AUTOMATED DIFF WBC COUNT: CPT | Performed by: EMERGENCY MEDICINE

## 2025-08-16 PROCEDURE — 80053 COMPREHEN METABOLIC PANEL: CPT | Performed by: EMERGENCY MEDICINE

## 2025-08-16 PROCEDURE — 36415 COLL VENOUS BLD VENIPUNCTURE: CPT | Performed by: EMERGENCY MEDICINE

## 2025-08-16 RX ORDER — FLUMAZENIL 0.1 MG/ML
0.2 INJECTION, SOLUTION INTRAVENOUS
Status: DISCONTINUED | OUTPATIENT
Start: 2025-08-16 | End: 2025-08-17 | Stop reason: HOSPADM

## 2025-08-16 RX ORDER — HALOPERIDOL 5 MG/ML
2.5-5 INJECTION INTRAMUSCULAR EVERY 6 HOURS PRN
Status: DISCONTINUED | OUTPATIENT
Start: 2025-08-16 | End: 2025-08-17 | Stop reason: HOSPADM

## 2025-08-16 RX ORDER — GABAPENTIN 300 MG/1
900 CAPSULE ORAL EVERY 8 HOURS
Status: DISCONTINUED | OUTPATIENT
Start: 2025-08-17 | End: 2025-08-17 | Stop reason: HOSPADM

## 2025-08-16 RX ORDER — GABAPENTIN 300 MG/1
600 CAPSULE ORAL EVERY 8 HOURS
Status: DISCONTINUED | OUTPATIENT
Start: 2025-08-20 | End: 2025-08-17 | Stop reason: HOSPADM

## 2025-08-16 RX ORDER — MULTIPLE VITAMINS W/ MINERALS TAB 9MG-400MCG
1 TAB ORAL DAILY
Status: DISCONTINUED | OUTPATIENT
Start: 2025-08-17 | End: 2025-08-17 | Stop reason: HOSPADM

## 2025-08-16 RX ORDER — MAGNESIUM SULFATE HEPTAHYDRATE 40 MG/ML
2 INJECTION, SOLUTION INTRAVENOUS ONCE
Status: COMPLETED | OUTPATIENT
Start: 2025-08-16 | End: 2025-08-17

## 2025-08-16 RX ORDER — FOLIC ACID 5 MG/ML
1 INJECTION, SOLUTION INTRAMUSCULAR; INTRAVENOUS; SUBCUTANEOUS ONCE
Status: COMPLETED | OUTPATIENT
Start: 2025-08-16 | End: 2025-08-17

## 2025-08-16 RX ORDER — CLONIDINE HYDROCHLORIDE 0.1 MG/1
0.1 TABLET ORAL EVERY 8 HOURS
Status: DISCONTINUED | OUTPATIENT
Start: 2025-08-16 | End: 2025-08-17 | Stop reason: HOSPADM

## 2025-08-16 RX ORDER — GABAPENTIN 100 MG/1
100 CAPSULE ORAL EVERY 8 HOURS
Status: DISCONTINUED | OUTPATIENT
Start: 2025-08-24 | End: 2025-08-17 | Stop reason: HOSPADM

## 2025-08-16 RX ORDER — DIAZEPAM 10 MG/2ML
5 INJECTION, SOLUTION INTRAMUSCULAR; INTRAVENOUS ONCE
Status: COMPLETED | OUTPATIENT
Start: 2025-08-16 | End: 2025-08-16

## 2025-08-16 RX ORDER — DIAZEPAM 10 MG/2ML
5-10 INJECTION, SOLUTION INTRAMUSCULAR; INTRAVENOUS EVERY 30 MIN PRN
Status: DISCONTINUED | OUTPATIENT
Start: 2025-08-16 | End: 2025-08-17 | Stop reason: HOSPADM

## 2025-08-16 RX ORDER — THIAMINE HYDROCHLORIDE 100 MG/ML
100 INJECTION, SOLUTION INTRAMUSCULAR; INTRAVENOUS ONCE
Status: COMPLETED | OUTPATIENT
Start: 2025-08-16 | End: 2025-08-16

## 2025-08-16 RX ORDER — DIAZEPAM 5 MG/1
10 TABLET ORAL EVERY 30 MIN PRN
Status: DISCONTINUED | OUTPATIENT
Start: 2025-08-16 | End: 2025-08-17 | Stop reason: HOSPADM

## 2025-08-16 RX ORDER — ONDANSETRON 2 MG/ML
4 INJECTION INTRAMUSCULAR; INTRAVENOUS EVERY 30 MIN PRN
Status: DISCONTINUED | OUTPATIENT
Start: 2025-08-16 | End: 2025-08-17 | Stop reason: HOSPADM

## 2025-08-16 RX ORDER — OLANZAPINE 5 MG/1
5-10 TABLET, ORALLY DISINTEGRATING ORAL EVERY 6 HOURS PRN
Status: DISCONTINUED | OUTPATIENT
Start: 2025-08-16 | End: 2025-08-17 | Stop reason: HOSPADM

## 2025-08-16 RX ORDER — GABAPENTIN 600 MG/1
1200 TABLET ORAL ONCE
Status: COMPLETED | OUTPATIENT
Start: 2025-08-16 | End: 2025-08-16

## 2025-08-16 RX ORDER — GABAPENTIN 300 MG/1
300 CAPSULE ORAL EVERY 8 HOURS
Status: DISCONTINUED | OUTPATIENT
Start: 2025-08-22 | End: 2025-08-17 | Stop reason: HOSPADM

## 2025-08-16 RX ADMIN — ONDANSETRON 4 MG: 2 INJECTION, SOLUTION INTRAMUSCULAR; INTRAVENOUS at 22:11

## 2025-08-16 RX ADMIN — GABAPENTIN 1200 MG: 600 TABLET, FILM COATED ORAL at 22:47

## 2025-08-16 RX ADMIN — DIAZEPAM 5 MG: 10 INJECTION, SOLUTION INTRAMUSCULAR; INTRAVENOUS at 22:10

## 2025-08-16 RX ADMIN — THIAMINE HYDROCHLORIDE 100 MG: 100 INJECTION, SOLUTION INTRAMUSCULAR; INTRAVENOUS at 22:49

## 2025-08-16 RX ADMIN — SODIUM CHLORIDE 1000 ML: 0.9 INJECTION, SOLUTION INTRAVENOUS at 22:11

## 2025-08-16 RX ADMIN — CLONIDINE HYDROCHLORIDE 0.1 MG: 0.1 TABLET ORAL at 22:47

## 2025-08-16 RX ADMIN — DIAZEPAM 10 MG: 10 INJECTION, SOLUTION INTRAMUSCULAR; INTRAVENOUS at 22:56

## 2025-08-16 ASSESSMENT — LIFESTYLE VARIABLES
TACTILE DISTURBANCES: MILD ITCHING, PINS AND NEEDLES, BURNING OR NUMBNESS
VISUAL DISTURBANCES: VERY MILD SENSITIVITY
HEADACHE, FULLNESS IN HEAD: MILD
AUDITORY DISTURBANCES: NOT PRESENT
AGITATION: SOMEWHAT MORE THAN NORMAL ACTIVITY
ANXIETY: MODERATELY ANXIOUS, OR GUARDED, SO ANXIETY IS INFERRED
HEADACHE, FULLNESS IN HEAD: SEVERE
PAROXYSMAL SWEATS: BARELY PERCEPTIBLE SWEATING, PALMS MOIST
PAROXYSMAL SWEATS: 2
TREMOR: MODERATE, WITH PATIENT'S ARMS EXTENDED
TOTAL SCORE: 30
ORIENTATION AND CLOUDING OF SENSORIUM: ORIENTED AND CAN DO SERIAL ADDITIONS
AGITATION: MODERATELY FIDGETY AND RESTLESS
TREMOR: 6
TOTAL SCORE: 19
NAUSEA AND VOMITING: INTERMITTENT NAUSEA WITH DRY HEAVES
NAUSEA AND VOMITING: 3
ANXIETY: 5
ORIENTATION AND CLOUDING OF SENSORIUM: ORIENTED AND CAN DO SERIAL ADDITIONS
VISUAL DISTURBANCES: MODERATE SENSITIVITY
AUDITORY DISTURBANCES: NOT PRESENT
TACTILE DISTURBANCES: MILD ITCHING, PINS AND NEEDLES, BURNING OR NUMBNESS

## 2025-08-16 ASSESSMENT — COLUMBIA-SUICIDE SEVERITY RATING SCALE - C-SSRS
4. HAVE YOU HAD THESE THOUGHTS AND HAD SOME INTENTION OF ACTING ON THEM?: NO
3. HAVE YOU BEEN THINKING ABOUT HOW YOU MIGHT KILL YOURSELF?: YES
5. HAVE YOU STARTED TO WORK OUT OR WORKED OUT THE DETAILS OF HOW TO KILL YOURSELF? DO YOU INTEND TO CARRY OUT THIS PLAN?: NO
6. HAVE YOU EVER DONE ANYTHING, STARTED TO DO ANYTHING, OR PREPARED TO DO ANYTHING TO END YOUR LIFE?: NO
1. IN THE PAST MONTH, HAVE YOU WISHED YOU WERE DEAD OR WISHED YOU COULD GO TO SLEEP AND NOT WAKE UP?: YES
2. HAVE YOU ACTUALLY HAD ANY THOUGHTS OF KILLING YOURSELF IN THE PAST MONTH?: YES

## 2025-08-16 ASSESSMENT — ACTIVITIES OF DAILY LIVING (ADL)
ADLS_ACUITY_SCORE: 56
ADLS_ACUITY_SCORE: 56

## 2025-08-17 VITALS
TEMPERATURE: 97.8 F | HEIGHT: 68 IN | DIASTOLIC BLOOD PRESSURE: 77 MMHG | RESPIRATION RATE: 13 BRPM | SYSTOLIC BLOOD PRESSURE: 115 MMHG | BODY MASS INDEX: 19.85 KG/M2 | HEART RATE: 75 BPM | OXYGEN SATURATION: 95 % | WEIGHT: 130.95 LBS

## 2025-08-17 PROCEDURE — 250N000011 HC RX IP 250 OP 636: Performed by: EMERGENCY MEDICINE

## 2025-08-17 PROCEDURE — 250N000013 HC RX MED GY IP 250 OP 250 PS 637: Performed by: EMERGENCY MEDICINE

## 2025-08-17 PROCEDURE — 96376 TX/PRO/DX INJ SAME DRUG ADON: CPT | Performed by: EMERGENCY MEDICINE

## 2025-08-17 PROCEDURE — 96366 THER/PROPH/DIAG IV INF ADDON: CPT | Performed by: EMERGENCY MEDICINE

## 2025-08-17 PROCEDURE — 96375 TX/PRO/DX INJ NEW DRUG ADDON: CPT | Performed by: EMERGENCY MEDICINE

## 2025-08-17 PROCEDURE — 96365 THER/PROPH/DIAG IV INF INIT: CPT | Performed by: EMERGENCY MEDICINE

## 2025-08-17 RX ADMIN — DIAZEPAM 10 MG: 5 TABLET ORAL at 03:33

## 2025-08-17 RX ADMIN — FOLIC ACID 1 MG: 5 INJECTION, SOLUTION INTRAMUSCULAR; INTRAVENOUS; SUBCUTANEOUS at 00:07

## 2025-08-17 RX ADMIN — MAGNESIUM SULFATE HEPTAHYDRATE 2 G: 40 INJECTION, SOLUTION INTRAVENOUS at 00:09

## 2025-08-17 RX ADMIN — DIAZEPAM 10 MG: 5 TABLET ORAL at 01:46

## 2025-08-17 RX ADMIN — DIAZEPAM 5 MG: 10 INJECTION, SOLUTION INTRAMUSCULAR; INTRAVENOUS at 00:14

## 2025-08-17 RX ADMIN — ONDANSETRON 4 MG: 2 INJECTION, SOLUTION INTRAMUSCULAR; INTRAVENOUS at 03:37

## 2025-08-17 ASSESSMENT — LIFESTYLE VARIABLES
ANXIETY: 3
NAUSEA AND VOMITING: MILD NAUSEA WITH NO VOMITING
AUDITORY DISTURBANCES: NOT PRESENT
PAROXYSMAL SWEATS: NO SWEAT VISIBLE
TOTAL SCORE: 12
HEADACHE, FULLNESS IN HEAD: VERY MILD
ANXIETY: MODERATELY ANXIOUS, OR GUARDED, SO ANXIETY IS INFERRED
AGITATION: NORMAL ACTIVITY
VISUAL DISTURBANCES: MODERATE SENSITIVITY
TREMOR: MODERATE, WITH PATIENT'S ARMS EXTENDED
TACTILE DISTURBANCES: VERY MILD ITCHING, PINS AND NEEDLES, BURNING OR NUMBNESS
ORIENTATION AND CLOUDING OF SENSORIUM: ORIENTED AND CAN DO SERIAL ADDITIONS
NAUSEA AND VOMITING: MILD NAUSEA WITH NO VOMITING
PAROXYSMAL SWEATS: 2
TACTILE DISTURBANCES: VERY MILD ITCHING, PINS AND NEEDLES, BURNING OR NUMBNESS
TREMOR: 3
ORIENTATION AND CLOUDING OF SENSORIUM: ORIENTED AND CAN DO SERIAL ADDITIONS
TOTAL SCORE: 22
AGITATION: MODERATELY FIDGETY AND RESTLESS
HEADACHE, FULLNESS IN HEAD: MODERATE
VISUAL DISTURBANCES: MODERATE SENSITIVITY
AUDITORY DISTURBANCES: NOT PRESENT

## 2025-08-17 ASSESSMENT — ACTIVITIES OF DAILY LIVING (ADL)
ADLS_ACUITY_SCORE: 56

## 2025-08-18 LAB
ATRIAL RATE - MUSE: 134 BPM
DIASTOLIC BLOOD PRESSURE - MUSE: NORMAL MMHG
INTERPRETATION ECG - MUSE: NORMAL
P AXIS - MUSE: 63 DEGREES
PR INTERVAL - MUSE: 144 MS
QRS DURATION - MUSE: 86 MS
QT - MUSE: 300 MS
QTC - MUSE: 448 MS
R AXIS - MUSE: 53 DEGREES
SYSTOLIC BLOOD PRESSURE - MUSE: NORMAL MMHG
T AXIS - MUSE: 30 DEGREES
VENTRICULAR RATE- MUSE: 134 BPM